# Patient Record
Sex: FEMALE | Race: WHITE | NOT HISPANIC OR LATINO | Employment: OTHER | URBAN - METROPOLITAN AREA
[De-identification: names, ages, dates, MRNs, and addresses within clinical notes are randomized per-mention and may not be internally consistent; named-entity substitution may affect disease eponyms.]

---

## 2023-06-16 LAB
BACTERIA, URINE: ABNORMAL /HPF
MUCUS, URINE: ABNORMAL /LPF
RBC, URINE: ABNORMAL /HPF (ref 0–5)
SQUAMOUS EPITHELIAL CELLS, URINE: 1 /HPF
WBC, URINE: 29 /HPF (ref 0–5)

## 2023-06-19 LAB — URINE CULTURE: ABNORMAL

## 2023-10-11 ENCOUNTER — APPOINTMENT (OUTPATIENT)
Dept: RADIOLOGY | Facility: HOSPITAL | Age: 73
End: 2023-10-11
Payer: MEDICARE

## 2023-10-11 ENCOUNTER — HOSPITAL ENCOUNTER (EMERGENCY)
Facility: HOSPITAL | Age: 73
Discharge: HOME | End: 2023-10-12
Attending: EMERGENCY MEDICINE
Payer: MEDICARE

## 2023-10-11 DIAGNOSIS — K45.0 INCARCERATED HERNIA OF ABDOMINAL CAVITY: Primary | ICD-10-CM

## 2023-10-11 DIAGNOSIS — N39.0 ACUTE LOWER UTI: ICD-10-CM

## 2023-10-11 LAB
BASOPHILS # BLD AUTO: 0.04 X10*3/UL (ref 0–0.1)
BASOPHILS NFR BLD AUTO: 0.3 %
CARDIAC TROPONIN I PNL SERPL HS: 5 NG/L (ref 0–13)
EOSINOPHIL # BLD AUTO: 0.03 X10*3/UL (ref 0–0.4)
EOSINOPHIL NFR BLD AUTO: 0.2 %
ERYTHROCYTE [DISTWIDTH] IN BLOOD BY AUTOMATED COUNT: 13.9 % (ref 11.5–14.5)
HCT VFR BLD AUTO: 39.9 % (ref 36–46)
HGB BLD-MCNC: 13.2 G/DL (ref 12–16)
IMM GRANULOCYTES # BLD AUTO: 0.04 X10*3/UL (ref 0–0.5)
IMM GRANULOCYTES NFR BLD AUTO: 0.3 % (ref 0–0.9)
LACTATE SERPL-SCNC: 1.8 MMOL/L (ref 0.4–2)
LYMPHOCYTES # BLD AUTO: 1.81 X10*3/UL (ref 0.8–3)
LYMPHOCYTES NFR BLD AUTO: 14.9 %
MCH RBC QN AUTO: 28.4 PG (ref 26–34)
MCHC RBC AUTO-ENTMCNC: 33.1 G/DL (ref 32–36)
MCV RBC AUTO: 86 FL (ref 80–100)
MONOCYTES # BLD AUTO: 0.9 X10*3/UL (ref 0.05–0.8)
MONOCYTES NFR BLD AUTO: 7.4 %
NEUTROPHILS # BLD AUTO: 9.36 X10*3/UL (ref 1.6–5.5)
NEUTROPHILS NFR BLD AUTO: 76.9 %
NRBC BLD-RTO: 0 /100 WBCS (ref 0–0)
PLATELET # BLD AUTO: 289 X10*3/UL (ref 150–450)
PMV BLD AUTO: 9 FL (ref 7.5–11.5)
RBC # BLD AUTO: 4.65 X10*6/UL (ref 4–5.2)
WBC # BLD AUTO: 12.2 X10*3/UL (ref 4.4–11.3)

## 2023-10-11 PROCEDURE — 83605 ASSAY OF LACTIC ACID: CPT | Performed by: EMERGENCY MEDICINE

## 2023-10-11 PROCEDURE — 83690 ASSAY OF LIPASE: CPT | Performed by: EMERGENCY MEDICINE

## 2023-10-11 PROCEDURE — 74177 CT ABD & PELVIS W/CONTRAST: CPT | Performed by: STUDENT IN AN ORGANIZED HEALTH CARE EDUCATION/TRAINING PROGRAM

## 2023-10-11 PROCEDURE — 85025 COMPLETE CBC W/AUTO DIFF WBC: CPT | Performed by: EMERGENCY MEDICINE

## 2023-10-11 PROCEDURE — 96374 THER/PROPH/DIAG INJ IV PUSH: CPT

## 2023-10-11 PROCEDURE — 99285 EMERGENCY DEPT VISIT HI MDM: CPT | Mod: 25

## 2023-10-11 PROCEDURE — 2500000004 HC RX 250 GENERAL PHARMACY W/ HCPCS (ALT 636 FOR OP/ED): Performed by: EMERGENCY MEDICINE

## 2023-10-11 PROCEDURE — 99285 EMERGENCY DEPT VISIT HI MDM: CPT | Performed by: EMERGENCY MEDICINE

## 2023-10-11 PROCEDURE — 80053 COMPREHEN METABOLIC PANEL: CPT | Performed by: EMERGENCY MEDICINE

## 2023-10-11 PROCEDURE — 96375 TX/PRO/DX INJ NEW DRUG ADDON: CPT

## 2023-10-11 PROCEDURE — 36415 COLL VENOUS BLD VENIPUNCTURE: CPT | Performed by: EMERGENCY MEDICINE

## 2023-10-11 PROCEDURE — 99284 EMERGENCY DEPT VISIT MOD MDM: CPT | Performed by: EMERGENCY MEDICINE

## 2023-10-11 PROCEDURE — 74177 CT ABD & PELVIS W/CONTRAST: CPT | Mod: ME

## 2023-10-11 PROCEDURE — 84484 ASSAY OF TROPONIN QUANT: CPT | Performed by: EMERGENCY MEDICINE

## 2023-10-11 PROCEDURE — G1004 CDSM NDSC: HCPCS

## 2023-10-11 RX ORDER — MORPHINE SULFATE 4 MG/ML
4 INJECTION, SOLUTION INTRAMUSCULAR; INTRAVENOUS ONCE
Status: COMPLETED | OUTPATIENT
Start: 2023-10-11 | End: 2023-10-11

## 2023-10-11 RX ORDER — ONDANSETRON HYDROCHLORIDE 2 MG/ML
4 INJECTION, SOLUTION INTRAVENOUS ONCE
Status: COMPLETED | OUTPATIENT
Start: 2023-10-11 | End: 2023-10-11

## 2023-10-11 RX ADMIN — MORPHINE SULFATE 4 MG: 4 INJECTION, SOLUTION INTRAMUSCULAR; INTRAVENOUS at 23:03

## 2023-10-11 RX ADMIN — SODIUM CHLORIDE 1000 ML: 9 INJECTION, SOLUTION INTRAVENOUS at 23:04

## 2023-10-11 RX ADMIN — ONDANSETRON 4 MG: 2 INJECTION INTRAMUSCULAR; INTRAVENOUS at 23:04

## 2023-10-11 ASSESSMENT — LIFESTYLE VARIABLES
EVER HAD A DRINK FIRST THING IN THE MORNING TO STEADY YOUR NERVES TO GET RID OF A HANGOVER: NO
REASON UNABLE TO ASSESS: NO
EVER FELT BAD OR GUILTY ABOUT YOUR DRINKING: NO
HAVE YOU EVER FELT YOU SHOULD CUT DOWN ON YOUR DRINKING: NO
HAVE PEOPLE ANNOYED YOU BY CRITICIZING YOUR DRINKING: NO

## 2023-10-11 ASSESSMENT — COLUMBIA-SUICIDE SEVERITY RATING SCALE - C-SSRS
2. HAVE YOU ACTUALLY HAD ANY THOUGHTS OF KILLING YOURSELF?: NO
1. IN THE PAST MONTH, HAVE YOU WISHED YOU WERE DEAD OR WISHED YOU COULD GO TO SLEEP AND NOT WAKE UP?: NO
6. HAVE YOU EVER DONE ANYTHING, STARTED TO DO ANYTHING, OR PREPARED TO DO ANYTHING TO END YOUR LIFE?: NO

## 2023-10-11 ASSESSMENT — PAIN - FUNCTIONAL ASSESSMENT: PAIN_FUNCTIONAL_ASSESSMENT: 0-10

## 2023-10-11 ASSESSMENT — PAIN DESCRIPTION - PROGRESSION: CLINICAL_PROGRESSION: NOT CHANGED

## 2023-10-11 ASSESSMENT — PAIN DESCRIPTION - LOCATION: LOCATION: BACK

## 2023-10-11 ASSESSMENT — PAIN SCALES - GENERAL
PAINLEVEL_OUTOF10: 10 - WORST POSSIBLE PAIN
PAINLEVEL_OUTOF10: 10 - WORST POSSIBLE PAIN

## 2023-10-12 VITALS
HEART RATE: 82 BPM | HEIGHT: 66 IN | BODY MASS INDEX: 24.11 KG/M2 | OXYGEN SATURATION: 93 % | WEIGHT: 150 LBS | RESPIRATION RATE: 22 BRPM | DIASTOLIC BLOOD PRESSURE: 71 MMHG | SYSTOLIC BLOOD PRESSURE: 169 MMHG

## 2023-10-12 LAB
ALBUMIN SERPL BCP-MCNC: 4.4 G/DL (ref 3.4–5)
ALP SERPL-CCNC: 91 U/L (ref 33–136)
ALT SERPL W P-5'-P-CCNC: 13 U/L (ref 7–45)
ANION GAP SERPL CALC-SCNC: 15 MMOL/L (ref 10–20)
APPEARANCE UR: ABNORMAL
AST SERPL W P-5'-P-CCNC: 20 U/L (ref 9–39)
BACTERIA #/AREA URNS AUTO: ABNORMAL /HPF
BILIRUB SERPL-MCNC: 0.5 MG/DL (ref 0–1.2)
BILIRUB UR STRIP.AUTO-MCNC: NEGATIVE MG/DL
BUN SERPL-MCNC: 20 MG/DL (ref 6–23)
CALCIUM SERPL-MCNC: 9.9 MG/DL (ref 8.6–10.3)
CARDIAC TROPONIN I PNL SERPL HS: 10 NG/L (ref 0–13)
CHLORIDE SERPL-SCNC: 96 MMOL/L (ref 98–107)
CO2 SERPL-SCNC: 22 MMOL/L (ref 21–32)
COLOR UR: YELLOW
CREAT SERPL-MCNC: 0.65 MG/DL (ref 0.5–1.05)
GFR SERPL CREATININE-BSD FRML MDRD: >90 ML/MIN/1.73M*2
GLUCOSE SERPL-MCNC: 117 MG/DL (ref 74–99)
GLUCOSE UR STRIP.AUTO-MCNC: NEGATIVE MG/DL
HOLD SPECIMEN: NORMAL
HOLD SPECIMEN: NORMAL
KETONES UR STRIP.AUTO-MCNC: ABNORMAL MG/DL
LEUKOCYTE ESTERASE UR QL STRIP.AUTO: ABNORMAL
LIPASE SERPL-CCNC: 8 U/L (ref 9–82)
NITRITE UR QL STRIP.AUTO: NEGATIVE
PH UR STRIP.AUTO: 7 [PH]
POTASSIUM SERPL-SCNC: 3.5 MMOL/L (ref 3.5–5.3)
PROT SERPL-MCNC: 7.4 G/DL (ref 6.4–8.2)
PROT UR STRIP.AUTO-MCNC: NEGATIVE MG/DL
RBC # UR STRIP.AUTO: NEGATIVE /UL
RBC #/AREA URNS AUTO: ABNORMAL /HPF
SODIUM SERPL-SCNC: 129 MMOL/L (ref 136–145)
SP GR UR STRIP.AUTO: 1.01
UROBILINOGEN UR STRIP.AUTO-MCNC: <2 MG/DL
WBC #/AREA URNS AUTO: ABNORMAL /HPF

## 2023-10-12 PROCEDURE — 36415 COLL VENOUS BLD VENIPUNCTURE: CPT | Performed by: EMERGENCY MEDICINE

## 2023-10-12 PROCEDURE — 2550000001 HC RX 255 CONTRASTS: Performed by: EMERGENCY MEDICINE

## 2023-10-12 PROCEDURE — 81001 URINALYSIS AUTO W/SCOPE: CPT | Performed by: EMERGENCY MEDICINE

## 2023-10-12 PROCEDURE — 84484 ASSAY OF TROPONIN QUANT: CPT | Performed by: EMERGENCY MEDICINE

## 2023-10-12 RX ORDER — CEPHALEXIN 500 MG/1
500 CAPSULE ORAL ONCE
Status: DISCONTINUED | OUTPATIENT
Start: 2023-10-12 | End: 2023-10-12 | Stop reason: HOSPADM

## 2023-10-12 RX ORDER — CEPHALEXIN 500 MG/1
500 CAPSULE ORAL ONCE
Status: DISCONTINUED | OUTPATIENT
Start: 2023-10-12 | End: 2023-10-12

## 2023-10-12 RX ORDER — CEPHALEXIN 500 MG/1
500 CAPSULE ORAL 4 TIMES DAILY
Qty: 40 CAPSULE | Refills: 0 | Status: SHIPPED | OUTPATIENT
Start: 2023-10-12 | End: 2023-10-22

## 2023-10-12 RX ADMIN — IOHEXOL 75 ML: 350 INJECTION, SOLUTION INTRAVENOUS at 01:17

## 2023-10-12 NOTE — DISCHARGE INSTRUCTIONS
Follow-up with your primary care physician and with surgery.  Seek immediate medical attention with any worsening symptoms, worsening abdominal pain, vomiting, fevers, chills or for any reason

## 2023-10-12 NOTE — ED PROVIDER NOTES
HPI   No chief complaint on file.      Patient is a 73-year-old female presents emergency department via EMS with chief complaint of epigastric abdominal pain.  Patient states that this evening, she took a new medication for overactive bladder, mybertiq.  About 1 hour after taking this medication she developed pain.  Pain is described as severe, sharp pain located in her epigastrium and nonradiating.  Pain is worse with palpation.  Patient also noticed a mass that appeared at this time.  Patient states she is never had similar symptoms in the past.  No known hernia.  She denies any fevers, chills, chest pain, shortness of breath, vomiting, diarrhea, constipation, blood per rectum.  Patient does admit to nausea associated with her pain.  She did take Vicodin for pain in her hip earlier this evening.                          Thais Coma Scale Score: 15                  Patient History   Past Medical History:   Diagnosis Date    Drusen (degenerative) of macula, unspecified eye 02/19/2015    Macular drusen     Past Surgical History:   Procedure Laterality Date    OTHER SURGICAL HISTORY  03/06/2020    Rectocele and cystocele repair     No family history on file.  Social History     Tobacco Use    Smoking status: Not on file    Smokeless tobacco: Not on file   Substance Use Topics    Alcohol use: Not on file    Drug use: Not on file       Physical Exam   ED Triage Vitals   Temp Pulse Resp BP   -- -- -- --      SpO2 Temp src Heart Rate Source Patient Position   -- -- -- --      BP Location FiO2 (%)     -- --       Physical Exam  Vitals and nursing note reviewed.   Constitutional:       Appearance: Normal appearance.   HENT:      Head: Normocephalic and atraumatic.      Nose: Nose normal.      Mouth/Throat:      Mouth: Mucous membranes are moist.   Eyes:      Pupils: Pupils are equal, round, and reactive to light.   Cardiovascular:      Rate and Rhythm: Normal rate and regular rhythm.   Pulmonary:      Effort: Pulmonary  effort is normal.      Breath sounds: Normal breath sounds.   Abdominal:      Palpations: There is mass (Incarcerated abdominal wall hernia).      Tenderness: There is abdominal tenderness.   Musculoskeletal:         General: Normal range of motion.      Cervical back: Normal range of motion and neck supple.   Skin:     General: Skin is warm and dry.      Capillary Refill: Capillary refill takes less than 2 seconds.   Neurological:      General: No focal deficit present.      Mental Status: She is alert and oriented to person, place, and time.         ED Course & MDM   ED Course as of 10/13/23 0833   Thu Oct 12, 2023   0021 On second attempt, hernia successfully reduced.  Patient states her pain has improved.  Patient had not gone to CT imaging yet. [PS]      ED Course User Index  [PS] Alek Perdomo, DO         Diagnoses as of 10/13/23 0833   Incarcerated hernia of abdominal cavity   Acute lower UTI       Medical Decision Making  Patient is seen and examined.  Abdominal work-up is initiated.  Patient does feel to have an incarcerated abdominal wall hernia.  Patient is unable to tolerate reduction at times initially.  Patient is given pain medications and IV fluids.  Patient will be placed in Trendelenburg with an ice pack and attempt once again to reduce the hernia.  Initial attempt is not well tolerated. Patient is placed in further trendelenburg. And Ice pack reapplied.  Hernia reduction again attempted.  General pressure is placed until the hernia is reduced.  Patient does feel immediate relief.  CT imaging is obtained after the hernia is reduced.  There is no evidence of obstruction.  There is an abdominal wall hernia with some fluid present in the hernia sac.  Patient is able to eat and drink without vomiting.  Her pain is resolved.  Patient's lab work is positive for urinary tract infection.  Patient refuses initial dose of antibiotics in the emergency department, she will be discharged.  Advised to  follow-up with primary care physician.  Advised follow-up general surgery.  Patient is given strict return precautions.  Patient understands and agrees with discharge plan.          Procedure  Procedures     Alek Perdomo DO  10/13/23 0838

## 2023-10-13 ENCOUNTER — HOSPITAL ENCOUNTER (OUTPATIENT)
Dept: CARDIOLOGY | Facility: HOSPITAL | Age: 73
Discharge: HOME | End: 2023-10-13
Payer: MEDICARE

## 2023-10-13 LAB
ATRIAL RATE: 73 BPM
P AXIS: 30 DEGREES
P OFFSET: 183 MS
P ONSET: 132 MS
PR INTERVAL: 176 MS
Q ONSET: 220 MS
QRS COUNT: 11 BEATS
QRS DURATION: 84 MS
QT INTERVAL: 424 MS
QTC CALCULATION(BAZETT): 467 MS
QTC FREDERICIA: 452 MS
R AXIS: 35 DEGREES
T AXIS: 58 DEGREES
T OFFSET: 432 MS
VENTRICULAR RATE: 73 BPM

## 2023-10-13 PROCEDURE — 93005 ELECTROCARDIOGRAM TRACING: CPT

## 2023-10-19 ENCOUNTER — APPOINTMENT (OUTPATIENT)
Dept: SURGERY | Facility: CLINIC | Age: 73
End: 2023-10-19
Payer: MEDICARE

## 2023-10-22 ENCOUNTER — APPOINTMENT (OUTPATIENT)
Dept: RADIOLOGY | Facility: HOSPITAL | Age: 73
End: 2023-10-22
Payer: MEDICARE

## 2023-10-22 ENCOUNTER — HOSPITAL ENCOUNTER (EMERGENCY)
Facility: HOSPITAL | Age: 73
Discharge: HOME | End: 2023-10-22
Attending: STUDENT IN AN ORGANIZED HEALTH CARE EDUCATION/TRAINING PROGRAM
Payer: MEDICARE

## 2023-10-22 VITALS
DIASTOLIC BLOOD PRESSURE: 53 MMHG | TEMPERATURE: 98.2 F | SYSTOLIC BLOOD PRESSURE: 143 MMHG | BODY MASS INDEX: 24.09 KG/M2 | HEIGHT: 66 IN | WEIGHT: 149.91 LBS | RESPIRATION RATE: 16 BRPM | OXYGEN SATURATION: 98 % | HEART RATE: 78 BPM

## 2023-10-22 DIAGNOSIS — R33.9 URINARY RETENTION: ICD-10-CM

## 2023-10-22 DIAGNOSIS — M54.32 SCIATICA OF LEFT SIDE: ICD-10-CM

## 2023-10-22 DIAGNOSIS — N39.0 URINARY TRACT INFECTION WITHOUT HEMATURIA, SITE UNSPECIFIED: Primary | ICD-10-CM

## 2023-10-22 LAB
APPEARANCE UR: ABNORMAL
BACTERIA #/AREA URNS AUTO: ABNORMAL /HPF
BILIRUB UR STRIP.AUTO-MCNC: NEGATIVE MG/DL
COLOR UR: YELLOW
GLUCOSE UR STRIP.AUTO-MCNC: NEGATIVE MG/DL
HOLD SPECIMEN: NORMAL
KETONES UR STRIP.AUTO-MCNC: NEGATIVE MG/DL
LEUKOCYTE ESTERASE UR QL STRIP.AUTO: ABNORMAL
NITRITE UR QL STRIP.AUTO: POSITIVE
PH UR STRIP.AUTO: 6 [PH]
PROT UR STRIP.AUTO-MCNC: ABNORMAL MG/DL
RBC # UR STRIP.AUTO: NEGATIVE /UL
RBC #/AREA URNS AUTO: ABNORMAL /HPF
SP GR UR STRIP.AUTO: 1.01
UROBILINOGEN UR STRIP.AUTO-MCNC: <2 MG/DL
WBC #/AREA URNS AUTO: >50 /HPF

## 2023-10-22 PROCEDURE — 81001 URINALYSIS AUTO W/SCOPE: CPT | Performed by: STUDENT IN AN ORGANIZED HEALTH CARE EDUCATION/TRAINING PROGRAM

## 2023-10-22 PROCEDURE — 99284 EMERGENCY DEPT VISIT MOD MDM: CPT | Performed by: STUDENT IN AN ORGANIZED HEALTH CARE EDUCATION/TRAINING PROGRAM

## 2023-10-22 PROCEDURE — 2500000004 HC RX 250 GENERAL PHARMACY W/ HCPCS (ALT 636 FOR OP/ED): Performed by: STUDENT IN AN ORGANIZED HEALTH CARE EDUCATION/TRAINING PROGRAM

## 2023-10-22 PROCEDURE — 72148 MRI LUMBAR SPINE W/O DYE: CPT | Mod: MG

## 2023-10-22 PROCEDURE — 96375 TX/PRO/DX INJ NEW DRUG ADDON: CPT

## 2023-10-22 PROCEDURE — 96365 THER/PROPH/DIAG IV INF INIT: CPT

## 2023-10-22 PROCEDURE — 72148 MRI LUMBAR SPINE W/O DYE: CPT | Performed by: RADIOLOGY

## 2023-10-22 PROCEDURE — 96375 TX/PRO/DX INJ NEW DRUG ADDON: CPT | Performed by: STUDENT IN AN ORGANIZED HEALTH CARE EDUCATION/TRAINING PROGRAM

## 2023-10-22 PROCEDURE — 96372 THER/PROPH/DIAG INJ SC/IM: CPT | Performed by: STUDENT IN AN ORGANIZED HEALTH CARE EDUCATION/TRAINING PROGRAM

## 2023-10-22 PROCEDURE — 99285 EMERGENCY DEPT VISIT HI MDM: CPT | Performed by: STUDENT IN AN ORGANIZED HEALTH CARE EDUCATION/TRAINING PROGRAM

## 2023-10-22 PROCEDURE — 96372 THER/PROPH/DIAG INJ SC/IM: CPT

## 2023-10-22 PROCEDURE — 96365 THER/PROPH/DIAG IV INF INIT: CPT | Performed by: STUDENT IN AN ORGANIZED HEALTH CARE EDUCATION/TRAINING PROGRAM

## 2023-10-22 RX ORDER — KETOROLAC TROMETHAMINE 30 MG/ML
15 INJECTION, SOLUTION INTRAMUSCULAR; INTRAVENOUS ONCE
Status: COMPLETED | OUTPATIENT
Start: 2023-10-22 | End: 2023-10-22

## 2023-10-22 RX ORDER — ORPHENADRINE CITRATE 30 MG/ML
60 INJECTION INTRAMUSCULAR; INTRAVENOUS ONCE
Status: COMPLETED | OUTPATIENT
Start: 2023-10-22 | End: 2023-10-22

## 2023-10-22 RX ORDER — CEFTRIAXONE 1 G/50ML
1 INJECTION, SOLUTION INTRAVENOUS ONCE
Status: COMPLETED | OUTPATIENT
Start: 2023-10-22 | End: 2023-10-22

## 2023-10-22 RX ORDER — SULFAMETHOXAZOLE AND TRIMETHOPRIM 800; 160 MG/1; MG/1
1 TABLET ORAL 2 TIMES DAILY
Qty: 14 TABLET | Refills: 0 | Status: SHIPPED | OUTPATIENT
Start: 2023-10-22 | End: 2023-10-27

## 2023-10-22 RX ORDER — PREDNISONE 50 MG/1
50 TABLET ORAL DAILY
Qty: 5 TABLET | Refills: 0 | Status: SHIPPED | OUTPATIENT
Start: 2023-10-22 | End: 2023-10-27

## 2023-10-22 RX ORDER — MORPHINE SULFATE 4 MG/ML
4 INJECTION, SOLUTION INTRAMUSCULAR; INTRAVENOUS ONCE
Status: COMPLETED | OUTPATIENT
Start: 2023-10-22 | End: 2023-10-22

## 2023-10-22 RX ADMIN — CEFTRIAXONE SODIUM 1 G: 1 INJECTION, SOLUTION INTRAVENOUS at 10:35

## 2023-10-22 RX ADMIN — ORPHENADRINE CITRATE 60 MG: 60 INJECTION INTRAMUSCULAR; INTRAVENOUS at 08:28

## 2023-10-22 RX ADMIN — KETOROLAC TROMETHAMINE 15 MG: 60 INJECTION, SOLUTION INTRAMUSCULAR at 08:27

## 2023-10-22 RX ADMIN — MORPHINE SULFATE 4 MG: 4 INJECTION, SOLUTION INTRAMUSCULAR; INTRAVENOUS at 10:34

## 2023-10-22 ASSESSMENT — PAIN DESCRIPTION - FREQUENCY: FREQUENCY: CONSTANT/CONTINUOUS

## 2023-10-22 ASSESSMENT — PAIN DESCRIPTION - LOCATION: LOCATION: HIP

## 2023-10-22 ASSESSMENT — LIFESTYLE VARIABLES
HAVE PEOPLE ANNOYED YOU BY CRITICIZING YOUR DRINKING: NO
REASON UNABLE TO ASSESS: NO
EVER FELT BAD OR GUILTY ABOUT YOUR DRINKING: NO
HAVE YOU EVER FELT YOU SHOULD CUT DOWN ON YOUR DRINKING: NO
EVER HAD A DRINK FIRST THING IN THE MORNING TO STEADY YOUR NERVES TO GET RID OF A HANGOVER: NO

## 2023-10-22 ASSESSMENT — PAIN SCALES - GENERAL
PAINLEVEL_OUTOF10: 10 - WORST POSSIBLE PAIN
PAINLEVEL_OUTOF10: 10 - WORST POSSIBLE PAIN
PAINLEVEL_OUTOF10: 9

## 2023-10-22 ASSESSMENT — PAIN DESCRIPTION - ORIENTATION: ORIENTATION: RIGHT

## 2023-10-22 ASSESSMENT — PAIN - FUNCTIONAL ASSESSMENT: PAIN_FUNCTIONAL_ASSESSMENT: 0-10

## 2023-10-22 ASSESSMENT — PAIN DESCRIPTION - DESCRIPTORS: DESCRIPTORS: PRESSURE;RADIATING;SHARP;SHOOTING

## 2023-10-22 ASSESSMENT — PAIN DESCRIPTION - DIRECTION: RADIATING_TOWARDS: LEG

## 2023-10-22 ASSESSMENT — COLUMBIA-SUICIDE SEVERITY RATING SCALE - C-SSRS
1. IN THE PAST MONTH, HAVE YOU WISHED YOU WERE DEAD OR WISHED YOU COULD GO TO SLEEP AND NOT WAKE UP?: NO
2. HAVE YOU ACTUALLY HAD ANY THOUGHTS OF KILLING YOURSELF?: NO
6. HAVE YOU EVER DONE ANYTHING, STARTED TO DO ANYTHING, OR PREPARED TO DO ANYTHING TO END YOUR LIFE?: NO

## 2023-10-22 ASSESSMENT — PAIN DESCRIPTION - ONSET: ONSET: ONGOING

## 2023-10-22 NOTE — DISCHARGE INSTRUCTIONS
Please follow-up with your primary care physician as well as with urology for your urinary retention.  Please take the medications you are prescribed as instructed.  If you develop any lethargy, severe worsening pain, or if you have any other concerns, please return to the nearest ER for further care.

## 2023-10-22 NOTE — ED PROVIDER NOTES
HPI   Chief Complaint   Patient presents with    Hip Pain     Patient has had left hip pain for one month and this morning she was unable to urinate.        HPI  Patient is a 73-year-old female presents the ER due to concern for low back pain and urinary retention.  Patient states that she had a fall earlier this month and she has had intermittent low back pain with pain rating down her left leg.  She states that today pain was worse however she began to have some urinary retention and his last urinated 13 hours prior.  She states she was in the ER within the past 2 weeks and was diagnosed with a UTI was started on antibiotics.  She has completed course since then.  She denies any dysuria, hematuria.  She reports the pain in her back is in her buttock and radiates down her left leg.  She states that she was told that physical therapy that may be related to her piriformis.  She denies any flank pain, abdominal pain, chest pain, shortness of breath.  She denies any fevers or chills.                  Thais Coma Scale Score: 15                  Patient History   Past Medical History:   Diagnosis Date    Drusen (degenerative) of macula, unspecified eye 02/19/2015    Macular drusen     Past Surgical History:   Procedure Laterality Date    OTHER SURGICAL HISTORY  03/06/2020    Rectocele and cystocele repair     No family history on file.  Social History     Tobacco Use    Smoking status: Never    Smokeless tobacco: Never   Substance Use Topics    Alcohol use: Not on file    Drug use: Not on file       Physical Exam   ED Triage Vitals [10/22/23 0736]   Temp Heart Rate Resp BP   36.3 °C (97.3 °F) 88 16 141/68      SpO2 Temp Source Heart Rate Source Patient Position   98 % Temporal -- --      BP Location FiO2 (%)     Right arm --       Physical Exam  Vitals and nursing note reviewed.   Constitutional:       General: She is not in acute distress.     Appearance: She is well-developed.   HENT:      Head: Normocephalic and  atraumatic.   Eyes:      Conjunctiva/sclera: Conjunctivae normal.   Cardiovascular:      Rate and Rhythm: Normal rate and regular rhythm.      Heart sounds: No murmur heard.  Pulmonary:      Effort: Pulmonary effort is normal. No respiratory distress.      Breath sounds: Normal breath sounds.   Abdominal:      Palpations: Abdomen is soft.      Tenderness: There is no abdominal tenderness.   Musculoskeletal:         General: No swelling.      Cervical back: Neck supple.      Comments: Plan straight leg test on left.  No midline C, T, L-spine tenderness to palpation.   Skin:     General: Skin is warm and dry.      Capillary Refill: Capillary refill takes less than 2 seconds.   Neurological:      Mental Status: She is alert.   Psychiatric:         Mood and Affect: Mood normal.         ED Course & MDM   ED Course as of 10/22/23 1126   Sun Oct 22, 2023   1031 Patient is a 73-year-old female who presents to the ER today due to concern for low back pain with pain radiating to her leg as well as urinary retention.  On arrival, patient was in no acute distress however did have significant pain.  She had positive straight leg test on left.  She was endorsing urinary retention on point-of-care ultrasound imaging of her bladder, there was significant amount of urine.  She did receive straight catheterization with approximately 1 L of urine output.  We did send urine for UA.  Given her urinary tension as well as her back pain, did order MRI imaging as she is scheduled to have this anyways in 2 days.  We will assess for cauda equina syndrome.  Did give patient Norflex as well as Toradol in ER. [MJ]   1032 Bacteria, Urine(!): 2+ [MJ]   1033 Nitrite, Urine(!): POSITIVE [MJ]   1033 Leukocyte Esterase, Urine(!): LARGE (3+)  Patient did test positive for UTI.  She was given dose of Rocephin. [MJ]   1123 MR lumbar spine wo IV contrast  IMPRESSION:  Degenerative changes of the lumbar spine as above described without  high-grade central  canal or foraminal stenosis identified.   [MJ]   1123 Patient was given dose of Rocephin in ER for UTI.  Patient does not have any signs of cauda equina on MRI imaging.  Do feel comfortable discharging patient home.  Did offer Mathias catheter for patient, which she was agreeable to.  Given this, Mathias will be placed in ER and patient will follow-up with urology at outpatient.  She is given Bactrim for home.  Patient does have muscle relaxers at home, which did help so we will hold on prescribing this today.  Did give prednisone burst prescription for her.  She was instructed to follow-up with her PCP and urology.  Strict return precautions were given.  Patient was understanding and agreeable with plan for discharge. [MJ]      ED Course User Index  [MJ] Carmelo Lewis DO         Diagnoses as of 10/22/23 1126   Urinary tract infection without hematuria, site unspecified   Sciatica of left side   Urinary retention       Medical Decision Making      Procedure  Procedures     Carmelo Lewis DO  Resident  10/22/23 1126

## 2023-10-23 ENCOUNTER — TELEPHONE (OUTPATIENT)
Dept: UROLOGY | Facility: CLINIC | Age: 73
End: 2023-10-23

## 2023-10-23 ENCOUNTER — OFFICE VISIT (OUTPATIENT)
Dept: UROLOGY | Facility: CLINIC | Age: 73
End: 2023-10-23
Payer: MEDICARE

## 2023-10-23 VITALS — SYSTOLIC BLOOD PRESSURE: 124 MMHG | DIASTOLIC BLOOD PRESSURE: 81 MMHG | TEMPERATURE: 97.8 F | HEART RATE: 105 BPM

## 2023-10-23 DIAGNOSIS — R33.9 URINARY RETENTION: Primary | ICD-10-CM

## 2023-10-23 DIAGNOSIS — N30.00 ACUTE CYSTITIS WITHOUT HEMATURIA: ICD-10-CM

## 2023-10-23 PROCEDURE — 1159F MED LIST DOCD IN RCRD: CPT | Performed by: NURSE PRACTITIONER

## 2023-10-23 PROCEDURE — 99213 OFFICE O/P EST LOW 20 MIN: CPT | Performed by: NURSE PRACTITIONER

## 2023-10-23 PROCEDURE — 1125F AMNT PAIN NOTED PAIN PRSNT: CPT | Performed by: NURSE PRACTITIONER

## 2023-10-23 PROCEDURE — 1036F TOBACCO NON-USER: CPT | Performed by: NURSE PRACTITIONER

## 2023-10-23 RX ORDER — TAMSULOSIN HYDROCHLORIDE 0.4 MG/1
0.4 CAPSULE ORAL NIGHTLY
Qty: 30 CAPSULE | Refills: 0 | Status: SHIPPED | OUTPATIENT
Start: 2023-10-23 | End: 2023-11-15 | Stop reason: SDUPTHER

## 2023-10-23 NOTE — TELEPHONE ENCOUNTER
Pt left message stating she forgot to ask you if she can take her Myrbetriq 25mg that was prescribed by her PCP throughout all this because she thought she read somewhere that it can cause retention. Please advise, thanks

## 2023-10-23 NOTE — PATIENT INSTRUCTIONS
Finish antibiotics  Will keep catheter 1-2 weeks to allow bladder decompress  Tamsulosin 0.4 mg at bedtime  TOV in 1-2 weeks early a.m. appt.

## 2023-10-23 NOTE — PROGRESS NOTES
10/23/23   [id number]    Urinary retention, UTI     Subjective      HPI Demetria Enriquez is a 73 y.o. female who presents for urinary retention, follow up ER visit. Seen in ER yesterday, had not urinated 13 hrs, 1 liter when cath placed; recent UTI, still appeared infected, started on bactrim. MRI done showed degenerative changes but no high grade central canal or foraminal stenosis identified; CT abd/pel 10/12/23 no hydro, no stones, mild bladder distention, constipation;     6/17/23 Ecoli + culture,  no culture earlier in month noted on chart;         Objective     There were no vitals taken for this visit.   Physical Exam  Constitutional:       Appearance: Normal appearance. She is normal weight.   HENT:      Head: Normocephalic and atraumatic.      Nose: Nose normal.   Pulmonary:      Effort: Pulmonary effort is normal.   Musculoskeletal:      Comments: In wheel chair   Skin:     General: Skin is warm and dry.   Neurological:      General: No focal deficit present.      Mental Status: She is alert. Mental status is at baseline.   Psychiatric:         Mood and Affect: Mood normal.         Thought Content: Thought content normal.         Judgment: Judgment normal.         Assessment/Plan   Problem List Items Addressed This Visit          Genitourinary and Reproductive    Urinary retention - Primary     Other Visit Diagnoses       Acute cystitis without hematuria              No orders of the defined types were placed in this encounter.     Finish antibiotics  Will keep catheter 1-2 weeks to allow bladder decompress  Tamsulosin 0.4 mg at bedtime  TOV in 1-2 weeks early a.m. appt.       NUZHAT Tapia-CNP  Lab Results   Component Value Date    GLUCOSE 117 (H) 10/11/2023    CALCIUM 9.9 10/11/2023     (L) 10/11/2023    K 3.5 10/11/2023    CO2 22 10/11/2023    CL 96 (L) 10/11/2023    BUN 20 10/11/2023    CREATININE 0.65 10/11/2023       
None

## 2023-10-26 ENCOUNTER — TELEPHONE (OUTPATIENT)
Dept: UROLOGY | Facility: CLINIC | Age: 73
End: 2023-10-26
Payer: MEDICARE

## 2023-10-26 DIAGNOSIS — N32.89 BLADDER SPASM: Primary | ICD-10-CM

## 2023-10-26 RX ORDER — PHENAZOPYRIDINE HYDROCHLORIDE 200 MG/1
200 TABLET, FILM COATED ORAL 3 TIMES DAILY PRN
Qty: 10 TABLET | Refills: 0 | Status: SHIPPED | OUTPATIENT
Start: 2023-10-26 | End: 2023-10-29

## 2023-10-26 NOTE — TELEPHONE ENCOUNTER
Pt left message asking for return call. Spoke with pt who states she has one or two days left on the Bactrim but she is still experiencing pelvic pain and cramping and wanted Tuyet to know. Tried to get her in tomorrow instead of Monday but we were unable to make it work. Please advise on any other possible tx for her Sx until we see her for a TOV on Monday, thanks

## 2023-10-27 ENCOUNTER — HOSPITAL ENCOUNTER (EMERGENCY)
Facility: HOSPITAL | Age: 73
Discharge: HOME | End: 2023-10-27
Attending: EMERGENCY MEDICINE
Payer: MEDICARE

## 2023-10-27 ENCOUNTER — TELEPHONE (OUTPATIENT)
Dept: UROLOGY | Facility: CLINIC | Age: 73
End: 2023-10-27
Payer: MEDICARE

## 2023-10-27 VITALS
WEIGHT: 150 LBS | DIASTOLIC BLOOD PRESSURE: 96 MMHG | HEART RATE: 80 BPM | SYSTOLIC BLOOD PRESSURE: 162 MMHG | HEIGHT: 66 IN | RESPIRATION RATE: 13 BRPM | OXYGEN SATURATION: 92 % | BODY MASS INDEX: 24.11 KG/M2 | TEMPERATURE: 98.4 F

## 2023-10-27 DIAGNOSIS — Z97.8 FOLEY CATHETER IN PLACE: Primary | ICD-10-CM

## 2023-10-27 PROBLEM — H52.203 HYPEROPIA OF BOTH EYES WITH ASTIGMATISM AND PRESBYOPIA: Status: ACTIVE | Noted: 2023-10-27

## 2023-10-27 PROBLEM — M25.60 LIMITED JOINT RANGE OF MOTION (ROM): Status: ACTIVE | Noted: 2018-11-05

## 2023-10-27 PROBLEM — M54.32 NEURALGIA OF LEFT SCIATIC NERVE: Status: ACTIVE | Noted: 2023-10-13

## 2023-10-27 PROBLEM — H52.03 HYPEROPIA OF BOTH EYES WITH ASTIGMATISM AND PRESBYOPIA: Status: ACTIVE | Noted: 2023-10-27

## 2023-10-27 PROBLEM — H43.811 PVD (POSTERIOR VITREOUS DETACHMENT), RIGHT EYE: Status: ACTIVE | Noted: 2023-10-27

## 2023-10-27 PROBLEM — M19.91 LOCALIZED, PRIMARY OSTEOARTHRITIS: Status: ACTIVE | Noted: 2021-02-01

## 2023-10-27 PROBLEM — H35.30 AMD (AGE-RELATED MACULAR DEGENERATION), BILATERAL: Status: ACTIVE | Noted: 2023-10-27

## 2023-10-27 PROBLEM — N95.2 POSTMENOPAUSAL ATROPHIC VAGINITIS: Status: ACTIVE | Noted: 2018-07-06

## 2023-10-27 PROBLEM — R35.1 NOCTURIA: Status: ACTIVE | Noted: 2023-10-27

## 2023-10-27 PROBLEM — N95.8 GENITOURINARY SYNDROME OF MENOPAUSE: Status: ACTIVE | Noted: 2023-10-27

## 2023-10-27 PROBLEM — H52.4 HYPEROPIA OF BOTH EYES WITH ASTIGMATISM AND PRESBYOPIA: Status: ACTIVE | Noted: 2023-10-27

## 2023-10-27 PROBLEM — M47.817 LUMBOSACRAL SPONDYLOSIS WITHOUT MYELOPATHY: Status: ACTIVE | Noted: 2023-10-24

## 2023-10-27 PROBLEM — K59.02 OUTLET DYSFUNCTION CONSTIPATION: Status: ACTIVE | Noted: 2018-07-06

## 2023-10-27 PROBLEM — N39.0 ACUTE UTI (URINARY TRACT INFECTION): Status: ACTIVE | Noted: 2023-10-27

## 2023-10-27 PROBLEM — H25.10 NUCLEAR SCLEROSIS: Status: ACTIVE | Noted: 2023-10-27

## 2023-10-27 PROBLEM — G25.3 MYOCLONUS: Status: ACTIVE | Noted: 2023-10-27

## 2023-10-27 PROCEDURE — 99283 EMERGENCY DEPT VISIT LOW MDM: CPT | Performed by: EMERGENCY MEDICINE

## 2023-10-27 PROCEDURE — 99284 EMERGENCY DEPT VISIT MOD MDM: CPT | Performed by: EMERGENCY MEDICINE

## 2023-10-27 RX ORDER — BACLOFEN 10 MG/1
10 TABLET ORAL 4 TIMES DAILY
COMMUNITY
End: 2023-11-28 | Stop reason: ALTCHOICE

## 2023-10-27 RX ORDER — CALCIUM CARBONATE/VITAMIN D3 500 MG-10
1 TABLET,CHEWABLE ORAL DAILY
COMMUNITY
End: 2023-12-19 | Stop reason: ENTERED-IN-ERROR

## 2023-10-27 RX ORDER — ESCITALOPRAM OXALATE 10 MG/1
10 TABLET ORAL
COMMUNITY
End: 2023-12-19 | Stop reason: ENTERED-IN-ERROR

## 2023-10-27 RX ORDER — NAPROXEN SODIUM 220 MG
220 TABLET ORAL 2 TIMES DAILY
COMMUNITY
End: 2023-11-28 | Stop reason: SDUPTHER

## 2023-10-27 RX ORDER — OMEPRAZOLE 40 MG/1
40 CAPSULE, DELAYED RELEASE ORAL DAILY
COMMUNITY
Start: 2023-10-14

## 2023-10-27 RX ORDER — NITROFURANTOIN 25; 75 MG/1; MG/1
100 CAPSULE ORAL EVERY 12 HOURS
COMMUNITY
Start: 2023-06-16 | End: 2023-11-28 | Stop reason: ALTCHOICE

## 2023-10-27 RX ORDER — NAPROXEN 500 MG/1
500 TABLET ORAL
COMMUNITY
End: 2024-02-25 | Stop reason: HOSPADM

## 2023-10-27 RX ORDER — CITALOPRAM 20 MG/1
20 TABLET, FILM COATED ORAL DAILY
COMMUNITY

## 2023-10-27 RX ORDER — SUMATRIPTAN SUCCINATE 100 MG/1
100 TABLET ORAL DAILY PRN
COMMUNITY

## 2023-10-27 RX ORDER — ESTRADIOL 0.1 MG/G
CREAM VAGINAL
COMMUNITY
End: 2023-12-19 | Stop reason: ENTERED-IN-ERROR

## 2023-10-27 RX ORDER — ARTIFICIAL TEARS 1; 2; 3 MG/ML; MG/ML; MG/ML
1 SOLUTION/ DROPS OPHTHALMIC 4 TIMES DAILY PRN
COMMUNITY
End: 2023-12-19 | Stop reason: ENTERED-IN-ERROR

## 2023-10-27 RX ORDER — HYDROCODONE BITARTRATE AND ACETAMINOPHEN 5; 325 MG/1; MG/1
TABLET ORAL
COMMUNITY
Start: 2023-10-24 | End: 2023-12-19 | Stop reason: ENTERED-IN-ERROR

## 2023-10-27 RX ORDER — SIMVASTATIN 20 MG/1
20 TABLET, FILM COATED ORAL NIGHTLY
COMMUNITY

## 2023-10-27 RX ORDER — NICOTINE POLACRILEX 2 MG
GUM BUCCAL
COMMUNITY
End: 2023-12-19 | Stop reason: ENTERED-IN-ERROR

## 2023-10-27 RX ORDER — ASPIRIN 325 MG
TABLET, DELAYED RELEASE (ENTERIC COATED) ORAL
COMMUNITY
End: 2023-12-19 | Stop reason: ENTERED-IN-ERROR

## 2023-10-27 RX ORDER — FARICIMAB 6 MG/.05ML
6 INJECTION, SOLUTION INTRAVITREAL
COMMUNITY
End: 2023-12-21 | Stop reason: HOSPADM

## 2023-10-27 RX ORDER — LEVOTHYROXINE SODIUM 25 UG/1
TABLET ORAL
COMMUNITY
End: 2023-12-19 | Stop reason: ENTERED-IN-ERROR

## 2023-10-27 RX ORDER — VIT C/E/ZN/COPPR/LUTEIN/ZEAXAN 250MG-90MG
1 CAPSULE ORAL 2 TIMES DAILY
COMMUNITY
End: 2023-12-19 | Stop reason: ENTERED-IN-ERROR

## 2023-10-27 RX ORDER — CHOLECALCIFEROL (VITAMIN D3) 25 MCG
25 TABLET ORAL DAILY
COMMUNITY

## 2023-10-27 RX ORDER — POLYETHYLENE GLYCOL 400 AND PROPYLENE GLYCOL 4; 3 MG/ML; MG/ML
SOLUTION/ DROPS OPHTHALMIC
COMMUNITY
End: 2023-12-19 | Stop reason: ENTERED-IN-ERROR

## 2023-10-27 RX ORDER — LEVOTHYROXINE SODIUM 50 UG/1
50 TABLET ORAL
COMMUNITY

## 2023-10-27 RX ORDER — DICLOFENAC SODIUM 10 MG/G
2 GEL TOPICAL EVERY 6 HOURS PRN
COMMUNITY
Start: 2021-02-01 | End: 2023-12-19 | Stop reason: ENTERED-IN-ERROR

## 2023-10-27 RX ORDER — RISEDRONATE SODIUM 35 MG/1
35 TABLET, FILM COATED ORAL
COMMUNITY
Start: 2008-12-15 | End: 2023-11-28 | Stop reason: ALTCHOICE

## 2023-10-27 RX ORDER — DIPHENHYDRAMINE HCL 25 MG
TABLET ORAL
COMMUNITY
End: 2023-12-19 | Stop reason: ENTERED-IN-ERROR

## 2023-10-27 RX ORDER — TALC
6 POWDER (GRAM) TOPICAL NIGHTLY
COMMUNITY

## 2023-10-27 RX ORDER — PSYLLIUM SEED (WITH DEXTROSE)
POWDER (GRAM) ORAL
COMMUNITY
End: 2023-11-28 | Stop reason: SDUPTHER

## 2023-10-27 RX ORDER — GUAIFENESIN 1200 MG
TABLET, EXTENDED RELEASE 12 HR ORAL
COMMUNITY
End: 2023-12-19 | Stop reason: ENTERED-IN-ERROR

## 2023-10-27 RX ORDER — CLOTRIMAZOLE AND BETAMETHASONE DIPROPIONATE 10; .64 MG/G; MG/G
CREAM TOPICAL 2 TIMES DAILY
COMMUNITY
Start: 2021-11-17 | End: 2023-12-19 | Stop reason: ENTERED-IN-ERROR

## 2023-10-27 RX ORDER — DOXEPIN HYDROCHLORIDE 10 MG/1
CAPSULE ORAL
COMMUNITY
End: 2023-12-19 | Stop reason: ENTERED-IN-ERROR

## 2023-10-27 RX ORDER — RISEDRONATE SODIUM 30 MG/1
30 TABLET, FILM COATED ORAL
COMMUNITY
End: 2023-11-28 | Stop reason: ALTCHOICE

## 2023-10-27 RX ORDER — PSYLLIUM HUSK 0.4 G
CAPSULE ORAL
COMMUNITY
Start: 2020-07-08 | End: 2023-12-19 | Stop reason: ENTERED-IN-ERROR

## 2023-10-27 RX ORDER — MIRABEGRON 25 MG/1
25 TABLET, FILM COATED, EXTENDED RELEASE ORAL DAILY
COMMUNITY
Start: 2023-10-11 | End: 2023-11-10

## 2023-10-27 RX ORDER — GABAPENTIN 400 MG/1
400 CAPSULE ORAL 2 TIMES DAILY
COMMUNITY
Start: 2023-01-13 | End: 2023-12-19 | Stop reason: ENTERED-IN-ERROR

## 2023-10-27 RX ORDER — ALPRAZOLAM 0.5 MG/1
0.5 TABLET ORAL 3 TIMES DAILY PRN
COMMUNITY
End: 2023-12-19 | Stop reason: ENTERED-IN-ERROR

## 2023-10-27 RX ORDER — POLYETHYLENE GLYCOL 3350 17 G/17G
POWDER, FOR SOLUTION ORAL
COMMUNITY
Start: 2020-07-08 | End: 2023-12-19 | Stop reason: ENTERED-IN-ERROR

## 2023-10-27 RX ORDER — HYDROXYZINE HYDROCHLORIDE 25 MG/1
50 TABLET, FILM COATED ORAL NIGHTLY
COMMUNITY
Start: 2017-11-21 | End: 2023-12-21 | Stop reason: HOSPADM

## 2023-10-27 RX ORDER — SUMATRIPTAN 50 MG/1
50 TABLET, FILM COATED ORAL ONCE AS NEEDED
COMMUNITY
End: 2023-12-19 | Stop reason: ENTERED-IN-ERROR

## 2023-10-27 ASSESSMENT — PAIN DESCRIPTION - PAIN TYPE: TYPE: CHRONIC PAIN

## 2023-10-27 ASSESSMENT — PAIN - FUNCTIONAL ASSESSMENT: PAIN_FUNCTIONAL_ASSESSMENT: 0-10

## 2023-10-27 ASSESSMENT — LIFESTYLE VARIABLES
HAVE PEOPLE ANNOYED YOU BY CRITICIZING YOUR DRINKING: NO
REASON UNABLE TO ASSESS: NO
EVER HAD A DRINK FIRST THING IN THE MORNING TO STEADY YOUR NERVES TO GET RID OF A HANGOVER: NO
EVER FELT BAD OR GUILTY ABOUT YOUR DRINKING: NO
HAVE YOU EVER FELT YOU SHOULD CUT DOWN ON YOUR DRINKING: NO

## 2023-10-27 ASSESSMENT — COLUMBIA-SUICIDE SEVERITY RATING SCALE - C-SSRS
6. HAVE YOU EVER DONE ANYTHING, STARTED TO DO ANYTHING, OR PREPARED TO DO ANYTHING TO END YOUR LIFE?: NO
1. IN THE PAST MONTH, HAVE YOU WISHED YOU WERE DEAD OR WISHED YOU COULD GO TO SLEEP AND NOT WAKE UP?: NO
2. HAVE YOU ACTUALLY HAD ANY THOUGHTS OF KILLING YOURSELF?: NO

## 2023-10-27 ASSESSMENT — PAIN SCALES - GENERAL: PAINLEVEL_OUTOF10: 8

## 2023-10-27 ASSESSMENT — PAIN DESCRIPTION - LOCATION: LOCATION: BACK

## 2023-10-27 NOTE — TELEPHONE ENCOUNTER
Pt left message late last night stating she went to get up to go to have a BM and realized she was all wet. There was still urine in the bag as well but she wasn't feeling all that well. This morning her daughter Cami called and states they figured out it was the Pyridium that was making her sick and wanted us to know that and asked if there is something else they can try. Please advise, [phone]

## 2023-10-27 NOTE — ED PROVIDER NOTES
HPI   Chief Complaint   Patient presents with    Urinary Retention       73-year-old female presenting to [facility] ED with complaint of leakage around her Mathias catheter.  She reports waking up this morning and noticing some urination on her bed.  Patient changed her close and there has not been leakage on her underwear since.  Reports Mathias catheter placed due to urinary retention and UTI in which she is taking Bactrim for and has a removal trial in 3 days.  Denies fevers, palpitations, shortness of breath, lightheadedness, or weakness.      History provided by:  Patient and relative                      Thais Coma Scale Score: 15         NIH Stroke Scale: 0          Patient History   Past Medical History:   Diagnosis Date    Drusen (degenerative) of macula, unspecified eye 02/19/2015    Macular drusen     Past Surgical History:   Procedure Laterality Date    OTHER SURGICAL HISTORY  03/06/2020    Rectocele and cystocele repair     No family history on file.  Social History     Tobacco Use    Smoking status: Never    Smokeless tobacco: Never   Substance Use Topics    Alcohol use: Not on file    Drug use: Not on file       Physical Exam   ED Triage Vitals [10/27/23 1016]   Temp Heart Rate Resp BP   36.9 °C (98.4 °F) 88 18 150/70      SpO2 Temp Source Heart Rate Source Patient Position   95 % Temporal Monitor Lying      BP Location FiO2 (%)     Right arm --       Physical Exam  Vitals and nursing note reviewed.   Constitutional:       General: She is awake. She is not in acute distress.     Appearance: Normal appearance. She is well-developed.   HENT:      Head: Normocephalic and atraumatic.      Right Ear: External ear normal.      Left Ear: External ear normal.      Nose: Nose normal. No congestion or rhinorrhea.      Mouth/Throat:      Mouth: Mucous membranes are moist.      Pharynx: Oropharynx is clear. No posterior oropharyngeal erythema.   Eyes:      General: No scleral icterus.        Right eye: No discharge.          Left eye: No discharge.      Extraocular Movements: Extraocular movements intact.      Conjunctiva/sclera: Conjunctivae normal.   Cardiovascular:      Rate and Rhythm: Normal rate and regular rhythm.      Pulses: Normal pulses.      Heart sounds: Normal heart sounds. No murmur heard.     No friction rub.   Pulmonary:      Effort: Pulmonary effort is normal. No respiratory distress.      Breath sounds: Normal breath sounds. No stridor. No wheezing or rales.   Abdominal:      General: There is no distension.      Palpations: Abdomen is soft.      Tenderness: There is no abdominal tenderness. There is no right CVA tenderness, left CVA tenderness, guarding or rebound.   Musculoskeletal:         General: No swelling or tenderness.      Cervical back: Normal range of motion and neck supple.      Right lower leg: No edema.      Left lower leg: No edema.   Skin:     General: Skin is warm and dry.      Capillary Refill: Capillary refill takes less than 2 seconds.      Coloration: Skin is not jaundiced or pale.      Findings: No lesion or rash.   Neurological:      General: No focal deficit present.      Mental Status: She is alert and oriented to person, place, and time. Mental status is at baseline.      Cranial Nerves: No cranial nerve deficit.      Sensory: No sensory deficit.      Motor: No weakness.   Psychiatric:         Mood and Affect: Mood normal.         Behavior: Behavior normal. Behavior is cooperative.         Thought Content: Thought content normal.         Judgment: Judgment normal.         ED Course & MDM   Diagnoses as of 10/28/23 0011   Mathias catheter in place       Medical Decision Making  73-year-old female with a history mention above presenting to the ED with complaint of episode of urine leakage despite Mathias catheter.    Patient is afebrile, hemodynamically stable on room air, and in no acute distress.  Physical exam findings mentioned above.  Bedside ultrasound of the bladder obtained.   Bedside ultrasound reveals minimal urine in bladder (<100cc) suggestive of functioning Mathias.  Discussed with patient likelihood of displacement of Mathias catheter while moving in bed causing a small amount of leakage as there has been no leakage throughout the day.    Disposition: Discussed with patient agreed discharge.  She will follow-up with her urologist as scheduled and continue her antibiotics.  Strict return precautions provided.        Procedure  Procedures     Max Haro MD  Resident  10/28/23 0021

## 2023-10-27 NOTE — DISCHARGE INSTRUCTIONS
Seek immediate medical attention should you have:  Abdominal pain, wet panties throughout the day, fevers, shortness of breath, weakness, confusion, vomiting, or any worsening or concerning symptoms.

## 2023-10-30 ENCOUNTER — OFFICE VISIT (OUTPATIENT)
Dept: UROLOGY | Facility: CLINIC | Age: 73
End: 2023-10-30
Payer: MEDICARE

## 2023-10-30 VITALS
WEIGHT: 150 LBS | SYSTOLIC BLOOD PRESSURE: 126 MMHG | HEART RATE: 78 BPM | BODY MASS INDEX: 24.21 KG/M2 | DIASTOLIC BLOOD PRESSURE: 70 MMHG

## 2023-10-30 DIAGNOSIS — R33.9 URINARY RETENTION: Primary | ICD-10-CM

## 2023-10-30 DIAGNOSIS — N30.00 ACUTE CYSTITIS WITHOUT HEMATURIA: ICD-10-CM

## 2023-10-30 PROCEDURE — 1125F AMNT PAIN NOTED PAIN PRSNT: CPT | Performed by: NURSE PRACTITIONER

## 2023-10-30 PROCEDURE — 51700 IRRIGATION OF BLADDER: CPT | Performed by: NURSE PRACTITIONER

## 2023-10-30 PROCEDURE — 99213 OFFICE O/P EST LOW 20 MIN: CPT | Performed by: NURSE PRACTITIONER

## 2023-10-30 PROCEDURE — 81001 URINALYSIS AUTO W/SCOPE: CPT

## 2023-10-30 PROCEDURE — 1159F MED LIST DOCD IN RCRD: CPT | Performed by: NURSE PRACTITIONER

## 2023-10-30 PROCEDURE — 1036F TOBACCO NON-USER: CPT | Performed by: NURSE PRACTITIONER

## 2023-10-30 NOTE — PROGRESS NOTES
10/30/23   [id number]    TOV     Subjective      HPI Demetria Enriquez is a 73 y.o. female who presents for TOV; Failed unable to void w 250 ml instilled in bladder, very large bm w TOV; pericare given multiple times;     Recap had been seen in ER for urinary retention on 10/22/23;  had not urinated 13 hrs, 1 liter when cath placed; recent UTI, finished bactrim. MRI done showed degenerative changes but no high grade central canal or foraminal stenosis identified; CT abd/pel 10/12/23 no hydro, no stones, mild bladder distention, constipation;      6/17/23 Ecoli + culture,  no culture earlier in month noted on chart;     Biggest issue is pain in back piriformis muscle, sciaticaPatient ID: Demetria Enriquez is a 73 y.o. female.    Bladder Catheterization    Date/Time: 10/30/2023 6:18 PM    Performed by: TRACIE Tapia  Authorized by: TRACIE Tapia    Procedure Details    Procedure: catheter insertion and bladder irrigation      Catheter insertion: indwelling      Catheter type: Mathias      Catheter size: 16 Fr    Complexity: simple      Number of initial attempts: 1    Urine characteristics: clear    Balloon inflation amount (mL): 10    Leg bag attached: yes      Post-Procedure Details     Outcome: patient tolerated procedure with difficulty              Objective     /70   Pulse 78   Wt 68 kg (150 lb)   BMI 24.21 kg/m²    Physical Exam  General: Appears comfortable and in no apparent distress, well nourished  Head: Normocephalic, atraumatic  Neck: trachea midline  Respiratory: respirations unlabored, no wheezes, and no use of accessory muscles  Cardiovascular: at rest no dyspnea, well perfused  Skin: no visible rashes or lesions  Neurologic: grossly intact, oriented to person, place, and time  Psychiatric: mood and affect appropriate  Musculoskeletal: in wheel chair for appt. no difficulty w upper body movement    No obvious prolapse, lots of pablo care given;     Assessment/Plan   Problem List  Items Addressed This Visit          Genitourinary and Reproductive    Urinary retention - Primary    Relevant Orders    Urine culture    Urinalysis with Reflex Microscopic     Other Visit Diagnoses       Acute cystitis without hematuria              Orders Placed This Encounter   Procedures    Urine culture     Standing Status:   Future     Number of Occurrences:   1     Standing Expiration Date:   11/6/2023     Order Specific Question:   Release result to MyChart     Answer:   Immediate [1]    Urinalysis with Reflex Microscopic     Standing Status:   Future     Number of Occurrences:   1     Standing Expiration Date:   10/30/2024     Order Specific Question:   Release result to MyChart     Answer:   Immediate [1]      Repeat TOV 2 weeks  Coninue tamsulosin  Hopeful once pain relieved, injection this Friday will do better w TOV         Tuyet Wahl, APRN-CNP  Lab Results   Component Value Date    GLUCOSE 117 (H) 10/11/2023    CALCIUM 9.9 10/11/2023     (L) 10/11/2023    K 3.5 10/11/2023    CO2 22 10/11/2023    CL 96 (L) 10/11/2023    BUN 20 10/11/2023    CREATININE 0.65 10/11/2023

## 2023-10-30 NOTE — PATIENT INSTRUCTIONS
TOV 2 weeks  Catheter reinserted, couldn't urinate, no obvious prolapse,  Hopeful once pain injection for back, will do better w TOV  Nurse line [phone]

## 2023-10-31 LAB
APPEARANCE UR: CLEAR
BACTERIA #/AREA URNS AUTO: ABNORMAL /HPF
BILIRUB UR STRIP.AUTO-MCNC: NEGATIVE MG/DL
COLOR UR: ABNORMAL
GLUCOSE UR STRIP.AUTO-MCNC: NEGATIVE MG/DL
KETONES UR STRIP.AUTO-MCNC: NEGATIVE MG/DL
LEUKOCYTE ESTERASE UR QL STRIP.AUTO: ABNORMAL
NITRITE UR QL STRIP.AUTO: NEGATIVE
PH UR STRIP.AUTO: 7 [PH]
PROT UR STRIP.AUTO-MCNC: NEGATIVE MG/DL
RBC # UR STRIP.AUTO: ABNORMAL /UL
RBC #/AREA URNS AUTO: ABNORMAL /HPF
SP GR UR STRIP.AUTO: 1
UROBILINOGEN UR STRIP.AUTO-MCNC: <2 MG/DL
WBC #/AREA URNS AUTO: ABNORMAL /HPF

## 2023-11-02 ENCOUNTER — APPOINTMENT (OUTPATIENT)
Dept: SURGERY | Facility: CLINIC | Age: 73
End: 2023-11-02
Payer: MEDICARE

## 2023-11-15 ENCOUNTER — OFFICE VISIT (OUTPATIENT)
Dept: UROLOGY | Facility: CLINIC | Age: 73
End: 2023-11-15
Payer: MEDICARE

## 2023-11-15 VITALS
BODY MASS INDEX: 23.78 KG/M2 | WEIGHT: 148 LBS | SYSTOLIC BLOOD PRESSURE: 147 MMHG | HEART RATE: 108 BPM | HEIGHT: 66 IN | TEMPERATURE: 96.2 F | DIASTOLIC BLOOD PRESSURE: 73 MMHG

## 2023-11-15 DIAGNOSIS — N30.00 ACUTE CYSTITIS WITHOUT HEMATURIA: ICD-10-CM

## 2023-11-15 DIAGNOSIS — R33.9 URINARY RETENTION: Primary | ICD-10-CM

## 2023-11-15 PROCEDURE — 1159F MED LIST DOCD IN RCRD: CPT | Performed by: NURSE PRACTITIONER

## 2023-11-15 PROCEDURE — 51700 IRRIGATION OF BLADDER: CPT | Performed by: NURSE PRACTITIONER

## 2023-11-15 PROCEDURE — 1125F AMNT PAIN NOTED PAIN PRSNT: CPT | Performed by: NURSE PRACTITIONER

## 2023-11-15 PROCEDURE — 1036F TOBACCO NON-USER: CPT | Performed by: NURSE PRACTITIONER

## 2023-11-15 PROCEDURE — 99213 OFFICE O/P EST LOW 20 MIN: CPT | Performed by: NURSE PRACTITIONER

## 2023-11-15 RX ORDER — TAMSULOSIN HYDROCHLORIDE 0.4 MG/1
0.8 CAPSULE ORAL NIGHTLY
Qty: 60 CAPSULE | Refills: 3 | Status: ON HOLD | OUTPATIENT
Start: 2023-11-15 | End: 2024-02-25 | Stop reason: SDUPTHER

## 2023-11-15 RX ORDER — TIZANIDINE 4 MG/1
4 TABLET ORAL EVERY 8 HOURS PRN
COMMUNITY
Start: 2023-11-07 | End: 2023-12-21 | Stop reason: HOSPADM

## 2023-11-15 NOTE — PATIENT INSTRUCTIONS
Empty partially, will come back for PVR this afternoon 3 pm  If not emptying will replace catheter this afternoon  Has two more injections for back pain planned  Constipation under control  If uncomfortable, come back sooner today  Nurse line [phone]

## 2023-11-15 NOTE — PROGRESS NOTES
"11/15/23   [id number]    TOV, follow up retention     Subjective      HPI Demetria Enriquez is a 73 y.o. female who presents for TOV; only able to urinate small amount w TOV, hesitancy, no constipation now, managed; but SI joint down to sacrum hurting, so aggravated, difficulty w walking, has had one back injection, two more injections planned this Friday; emptied bladder only partially this morning, will come back for PVR this afternoon.    Tolerating flomax well; no dizziness;        Recap had been seen in ER for urinary retention on 10/22/23;  had not urinated 13 hrs, 1 liter when cath placed; recent UTI, finished bactrim. MRI done showed degenerative changes but no high grade central canal or foraminal stenosis identified; CT abd/pel 10/12/23 no hydro, no stones, mild bladder distention, constipation; failed multiple TOV.     6/17/23 Ecoli + culture,  no culture earlier in month noted on chart;      Biggest issue is pain in back piriformis muscle, sciatica    Patient ID: Demetria Enriquez is a 73 y.o. female.    Objective     /73   Pulse 108   Temp 35.7 °C (96.2 °F)   Ht 1.676 m (5' 6\")   Wt 67.1 kg (148 lb)   BMI 23.89 kg/m²    Physical Exam  General: Appears comfortable and in no apparent distress, well nourished  Head: Normocephalic, atraumatic  Neck: trachea midline  Respiratory: respirations unlabored, no wheezes, and no use of accessory muscles  Cardiovascular: at rest no dyspnea, well perfused  Skin: no visible rashes or lesions  Neurologic: grossly intact, oriented to person, place, and time  Psychiatric: mood and affect appropriate  Musculoskeletal: in chair for appt. no difficulty w upper body movement      Assessment/Plan   Problem List Items Addressed This Visit          Genitourinary and Reproductive    Urinary retention - Primary    Relevant Medications    tamsulosin (Flomax) 0.4 mg 24 hr capsule    Other Relevant Orders    Voiding Trial (Completed)     Other Visit Diagnoses       Acute " cystitis without hematuria              Orders Placed This Encounter   Procedures    Voiding Trial      Empty partially, will come back for PVR this afternoon 3 pm  If not emptying will replace catheter this afternoon  Has two more injections for back pain planned  Constipation under control  If uncomfortable, come back sooner today  Nurse line [phone]       *came back afternoon, emptying bladder, PVR 71 ml after drinking all day urinating at home well;     Tuyet Wahl, APRN-CNP  Lab Results   Component Value Date    GLUCOSE 117 (H) 10/11/2023    CALCIUM 9.9 10/11/2023     (L) 10/11/2023    K 3.5 10/11/2023    CO2 22 10/11/2023    CL 96 (L) 10/11/2023    BUN 20 10/11/2023    CREATININE 0.65 10/11/2023

## 2023-11-28 ENCOUNTER — OFFICE VISIT (OUTPATIENT)
Dept: UROLOGY | Facility: CLINIC | Age: 73
End: 2023-11-28
Payer: MEDICARE

## 2023-11-28 VITALS
SYSTOLIC BLOOD PRESSURE: 168 MMHG | BODY MASS INDEX: 25.61 KG/M2 | HEART RATE: 92 BPM | HEIGHT: 64 IN | WEIGHT: 150 LBS | DIASTOLIC BLOOD PRESSURE: 89 MMHG

## 2023-11-28 DIAGNOSIS — N30.00 ACUTE CYSTITIS WITHOUT HEMATURIA: ICD-10-CM

## 2023-11-28 DIAGNOSIS — R33.9 URINARY RETENTION: Primary | ICD-10-CM

## 2023-11-28 LAB
POC APPEARANCE, URINE: ABNORMAL
POC BILIRUBIN, URINE: NEGATIVE
POC BLOOD, URINE: NEGATIVE
POC COLOR, URINE: YELLOW
POC GLUCOSE, URINE: NEGATIVE MG/DL
POC KETONES, URINE: ABNORMAL MG/DL
POC LEUKOCYTES, URINE: ABNORMAL
POC NITRITE,URINE: POSITIVE
POC PH, URINE: 5.5 PH
POC PROTEIN, URINE: NEGATIVE MG/DL
POC SPECIFIC GRAVITY, URINE: 1.02
POC UROBILINOGEN, URINE: 0.2 EU/DL

## 2023-11-28 PROCEDURE — 1036F TOBACCO NON-USER: CPT | Performed by: NURSE PRACTITIONER

## 2023-11-28 PROCEDURE — 81001 URINALYSIS AUTO W/SCOPE: CPT

## 2023-11-28 PROCEDURE — 51798 US URINE CAPACITY MEASURE: CPT | Performed by: NURSE PRACTITIONER

## 2023-11-28 PROCEDURE — 1159F MED LIST DOCD IN RCRD: CPT | Performed by: NURSE PRACTITIONER

## 2023-11-28 PROCEDURE — 81003 URINALYSIS AUTO W/O SCOPE: CPT | Performed by: NURSE PRACTITIONER

## 2023-11-28 PROCEDURE — 99213 OFFICE O/P EST LOW 20 MIN: CPT | Performed by: NURSE PRACTITIONER

## 2023-11-28 PROCEDURE — 1125F AMNT PAIN NOTED PAIN PRSNT: CPT | Performed by: NURSE PRACTITIONER

## 2023-11-28 PROCEDURE — 87086 URINE CULTURE/COLONY COUNT: CPT

## 2023-11-28 PROCEDURE — 87186 SC STD MICRODIL/AGAR DIL: CPT

## 2023-11-28 RX ORDER — SULFAMETHOXAZOLE AND TRIMETHOPRIM 800; 160 MG/1; MG/1
1 TABLET ORAL 2 TIMES DAILY
Qty: 14 TABLET | Refills: 0 | Status: SHIPPED | OUTPATIENT
Start: 2023-11-28 | End: 2023-12-01 | Stop reason: ALTCHOICE

## 2023-11-28 NOTE — PATIENT INSTRUCTIONS
Doing well w PVR now, but urine appears slightly infected  Culture sent, bactrim empirically w recent issues w UTI  Follow up 6 weeks  Didn't tolerate estrogen cream before caused itching  Constipation managed well w MiraLAX and prunes  Dmanose 2000 mg daily (capsules or powder to make drink amazon)  Nurse line [phone]

## 2023-11-28 NOTE — PROGRESS NOTES
"11/28/23   [id number]    PVR, follow up urinary retention     Subjective      HPI Demetria Enriquez is a 73 y.o. female who presents for follow up recent urinary retention, PVR; passed TOV on 11/15/23; Recent urinary retention developed w fall, back issues and UTI; so happy to no longer have difficulty urinating, PVR 29 ml, but urine does appear sl infected despite no concerning symptoms; will treat empirically as such difficulty w last UTI and start Dmanose, not able to do estrogen cream as continues to get yeast when uses;         Objective     /89   Pulse 92   Ht 1.626 m (5' 4\")   Wt 68 kg (150 lb)   BMI 25.75 kg/m²    Physical Exam  General: Appears comfortable and in no apparent distress, well nourished  Head: Normocephalic, atraumatic  Neck: trachea midline  Respiratory: respirations unlabored, no wheezes, and no use of accessory muscles  Cardiovascular: at rest no dyspnea, well perfused  Skin: no visible rashes or lesions  Neurologic: grossly intact, oriented to person, place, and time  Psychiatric: mood and affect appropriate  Musculoskeletal: in chair for appt. no difficulty w upper body movement      Assessment/Plan   Problem List Items Addressed This Visit          Genitourinary and Reproductive    Urinary retention - Primary    Relevant Orders    POCT UA Automated manually resulted (Completed)    Post-Void Residual (Completed)     Other Visit Diagnoses       Acute cystitis without hematuria        Relevant Medications    sulfamethoxazole-trimethoprim (Bactrim DS) 800-160 mg tablet    Other Relevant Orders    Urine culture    Urinalysis with Reflex Microscopic          Orders Placed This Encounter   Procedures    Post-Void Residual    Urine culture     Standing Status:   Future     Number of Occurrences:   1     Standing Expiration Date:   12/5/2023     Order Specific Question:   Release result to [facility]     Answer:   Immediate [1]    Urinalysis with Reflex Microscopic     Standing Status:   " Future     Number of Occurrences:   1     Standing Expiration Date:   11/28/2024     Order Specific Question:   Release result to [facility]     Answer:   Immediate [1]    POCT UA Automated manually resulted     Order Specific Question:   Release result to [facility]     Answer:   Immediate [1]             Doing well w PVR now, but urine appears slightly infected  Culture sent, bactrim empirically w recent issues w UTI  Follow up 6 weeks  Didn't tolerate estrogen cream before caused itching  Constipation managed well w MiraLAX and prunes  Dmanose 2000 mg daily (capsules or powder to make drink amazon)  Nurse line [phone]  Lab Results   Component Value Date    GLUCOSE 117 (H) 10/11/2023    CALCIUM 9.9 10/11/2023     (L) 10/11/2023    K 3.5 10/11/2023    CO2 22 10/11/2023    CL 96 (L) 10/11/2023    BUN 20 10/11/2023    CREATININE 0.65 10/11/2023

## 2023-11-29 LAB
APPEARANCE UR: ABNORMAL
BILIRUB UR STRIP.AUTO-MCNC: NEGATIVE MG/DL
CAOX CRY #/AREA UR COMP ASSIST: ABNORMAL /HPF
COLOR UR: ABNORMAL
GLUCOSE UR STRIP.AUTO-MCNC: NEGATIVE MG/DL
HYALINE CASTS #/AREA URNS AUTO: ABNORMAL /LPF
KETONES UR STRIP.AUTO-MCNC: NEGATIVE MG/DL
LEUKOCYTE ESTERASE UR QL STRIP.AUTO: ABNORMAL
MUCOUS THREADS #/AREA URNS AUTO: ABNORMAL /LPF
NITRITE UR QL STRIP.AUTO: NEGATIVE
PH UR STRIP.AUTO: 5 [PH]
PROT UR STRIP.AUTO-MCNC: ABNORMAL MG/DL
RBC # UR STRIP.AUTO: NEGATIVE /UL
RBC #/AREA URNS AUTO: ABNORMAL /HPF
SP GR UR STRIP.AUTO: 1.03
SQUAMOUS #/AREA URNS AUTO: ABNORMAL /HPF
UROBILINOGEN UR STRIP.AUTO-MCNC: <2 MG/DL
WBC #/AREA URNS AUTO: >50 /HPF
WBC CLUMPS #/AREA URNS AUTO: ABNORMAL /HPF

## 2023-12-01 ENCOUNTER — TELEPHONE (OUTPATIENT)
Dept: UROLOGY | Facility: CLINIC | Age: 73
End: 2023-12-01
Payer: MEDICARE

## 2023-12-01 DIAGNOSIS — N30.00 ACUTE CYSTITIS WITHOUT HEMATURIA: Primary | ICD-10-CM

## 2023-12-01 LAB — BACTERIA UR CULT: ABNORMAL

## 2023-12-01 RX ORDER — NITROFURANTOIN 25; 75 MG/1; MG/1
100 CAPSULE ORAL 2 TIMES DAILY
Qty: 14 CAPSULE | Refills: 0 | Status: SHIPPED | OUTPATIENT
Start: 2023-12-01 | End: 2023-12-08

## 2023-12-01 NOTE — TELEPHONE ENCOUNTER
----- Message from TRACIE Tapia sent at 12/1/2023  1:34 PM EST -----  Pleae let patient know to please stop bactrim (resistant) start macrobid sensitive, take w food or milk to keep from being nausea;     Thank you!    ----- Message -----  From: Celina Torres MA  Sent: 11/28/2023   3:18 PM EST  To: TRACIE Tapia

## 2023-12-05 ENCOUNTER — TELEPHONE (OUTPATIENT)
Dept: UROLOGY | Facility: CLINIC | Age: 73
End: 2023-12-05
Payer: MEDICARE

## 2023-12-05 NOTE — TELEPHONE ENCOUNTER
Pt left message stating she is doing well on the abx but was asking if she should drop off another urine when she is done so we can make sure the infection clears. Please advise, thanks.

## 2023-12-06 DIAGNOSIS — N39.0 RECURRENT UTI: Primary | ICD-10-CM

## 2023-12-06 NOTE — TELEPHONE ENCOUNTER
Pt informed, orders placed in chart and pt states she will drop off at lab on either Saturday or Monday.

## 2023-12-08 ENCOUNTER — TELEPHONE (OUTPATIENT)
Dept: UROLOGY | Facility: CLINIC | Age: 73
End: 2023-12-08
Payer: MEDICARE

## 2023-12-08 ENCOUNTER — LAB (OUTPATIENT)
Dept: LAB | Facility: LAB | Age: 73
End: 2023-12-08
Payer: MEDICARE

## 2023-12-08 DIAGNOSIS — N39.0 RECURRENT UTI: ICD-10-CM

## 2023-12-08 LAB
HYALINE CASTS #/AREA URNS AUTO: ABNORMAL /LPF
MUCOUS THREADS #/AREA URNS AUTO: ABNORMAL /LPF
RBC #/AREA URNS AUTO: ABNORMAL /HPF
SQUAMOUS #/AREA URNS AUTO: ABNORMAL /HPF
WBC #/AREA URNS AUTO: ABNORMAL /HPF

## 2023-12-08 PROCEDURE — 81001 URINALYSIS AUTO W/SCOPE: CPT

## 2023-12-08 PROCEDURE — 87086 URINE CULTURE/COLONY COUNT: CPT

## 2023-12-08 NOTE — TELEPHONE ENCOUNTER
Pt left message stating the Macrobid gave her a bad rash and she went to her PCP who had her stop the Macrobid and start taking Zyrtec. She woke up today and the rash is gone. Pt states she only had one day left of the Macrobid course anyway. I added that to her allergy list and pt is going to be dropping off a urine at a  lab sometime this afternoon so we can check to make sure the infection is gone, thanks.

## 2023-12-10 LAB — BACTERIA UR CULT: NORMAL

## 2023-12-11 ENCOUNTER — TELEPHONE (OUTPATIENT)
Dept: UROLOGY | Facility: CLINIC | Age: 73
End: 2023-12-11
Payer: MEDICARE

## 2023-12-11 NOTE — TELEPHONE ENCOUNTER
----- Message from TRACIE Tapia sent at 12/11/2023  9:22 AM EST -----  No infection, cleared up. ty  ----- Message -----  From: Lab, Background User  Sent: 12/8/2023   5:57 PM EST  To: TRACIE Tapia

## 2023-12-15 ENCOUNTER — TELEPHONE (OUTPATIENT)
Dept: UROLOGY | Facility: CLINIC | Age: 73
End: 2023-12-15
Payer: MEDICARE

## 2023-12-15 NOTE — TELEPHONE ENCOUNTER
Pt left detailed message asking for us to call her back whenever we get a chance as another provider was asking her what caused her urinary retention in the first place and she does not remember. She can't remember if it started back when she fell or if it was something else and she asking if we have in her chart or her notes the original reason for the retention. Please advise, thanks.

## 2023-12-17 ENCOUNTER — APPOINTMENT (OUTPATIENT)
Dept: CARDIOLOGY | Facility: HOSPITAL | Age: 73
End: 2023-12-17
Payer: MEDICARE

## 2023-12-17 ENCOUNTER — HOSPITAL ENCOUNTER (EMERGENCY)
Facility: HOSPITAL | Age: 73
Discharge: HOME | End: 2023-12-18
Attending: EMERGENCY MEDICINE
Payer: MEDICARE

## 2023-12-17 ENCOUNTER — APPOINTMENT (OUTPATIENT)
Dept: RADIOLOGY | Facility: HOSPITAL | Age: 73
End: 2023-12-17
Payer: MEDICARE

## 2023-12-17 DIAGNOSIS — R10.84 GENERALIZED ABDOMINAL PAIN: Primary | ICD-10-CM

## 2023-12-17 DIAGNOSIS — K43.9 HERNIA OF ABDOMINAL WALL: ICD-10-CM

## 2023-12-17 LAB
ALBUMIN SERPL BCP-MCNC: 3.5 G/DL (ref 3.4–5)
ALP SERPL-CCNC: 89 U/L (ref 33–136)
ALT SERPL W P-5'-P-CCNC: 9 U/L (ref 7–45)
ANION GAP SERPL CALC-SCNC: 11 MMOL/L
APPEARANCE UR: CLEAR
AST SERPL W P-5'-P-CCNC: 17 U/L (ref 9–39)
BACTERIA #/AREA URNS AUTO: ABNORMAL /HPF
BASOPHILS # BLD AUTO: 0.05 X10*3/UL (ref 0–0.1)
BASOPHILS NFR BLD AUTO: 0.7 %
BILIRUB SERPL-MCNC: 0.4 MG/DL (ref 0–1.2)
BILIRUB UR STRIP.AUTO-MCNC: NEGATIVE MG/DL
BUN SERPL-MCNC: 16 MG/DL (ref 6–23)
CALCIUM SERPL-MCNC: 8.2 MG/DL (ref 8.6–10.3)
CARDIAC TROPONIN I PNL SERPL HS: 6 NG/L (ref 0–13)
CHLORIDE SERPL-SCNC: 101 MMOL/L (ref 98–107)
CO2 SERPL-SCNC: 22 MMOL/L (ref 21–32)
COLOR UR: YELLOW
CREAT SERPL-MCNC: 0.5 MG/DL (ref 0.5–1.05)
EOSINOPHIL # BLD AUTO: 0.01 X10*3/UL (ref 0–0.4)
EOSINOPHIL NFR BLD AUTO: 0.1 %
ERYTHROCYTE [DISTWIDTH] IN BLOOD BY AUTOMATED COUNT: 16.8 % (ref 11.5–14.5)
GFR SERPL CREATININE-BSD FRML MDRD: >90 ML/MIN/1.73M*2
GLUCOSE SERPL-MCNC: 100 MG/DL (ref 74–99)
GLUCOSE UR STRIP.AUTO-MCNC: NEGATIVE MG/DL
HCT VFR BLD AUTO: 34.7 % (ref 36–46)
HGB BLD-MCNC: 11.4 G/DL (ref 12–16)
IMM GRANULOCYTES # BLD AUTO: 0.02 X10*3/UL (ref 0–0.5)
IMM GRANULOCYTES NFR BLD AUTO: 0.3 % (ref 0–0.9)
KETONES UR STRIP.AUTO-MCNC: NEGATIVE MG/DL
LACTATE SERPL-SCNC: 1 MMOL/L (ref 0.4–2)
LEUKOCYTE ESTERASE UR QL STRIP.AUTO: NEGATIVE
LYMPHOCYTES # BLD AUTO: 2.24 X10*3/UL (ref 0.8–3)
LYMPHOCYTES NFR BLD AUTO: 30.1 %
MCH RBC QN AUTO: 28.9 PG (ref 26–34)
MCHC RBC AUTO-ENTMCNC: 32.9 G/DL (ref 32–36)
MCV RBC AUTO: 88 FL (ref 80–100)
MONOCYTES # BLD AUTO: 0.47 X10*3/UL (ref 0.05–0.8)
MONOCYTES NFR BLD AUTO: 6.3 %
NEUTROPHILS # BLD AUTO: 4.64 X10*3/UL (ref 1.6–5.5)
NEUTROPHILS NFR BLD AUTO: 62.5 %
NITRITE UR QL STRIP.AUTO: POSITIVE
NRBC BLD-RTO: 0 /100 WBCS (ref 0–0)
PH UR STRIP.AUTO: 7 [PH]
PLATELET # BLD AUTO: 286 X10*3/UL (ref 150–450)
POTASSIUM SERPL-SCNC: 3.4 MMOL/L (ref 3.5–5.3)
PROT SERPL-MCNC: 5.7 G/DL (ref 6.4–8.2)
PROT UR STRIP.AUTO-MCNC: NEGATIVE MG/DL
RBC # BLD AUTO: 3.95 X10*6/UL (ref 4–5.2)
RBC # UR STRIP.AUTO: NEGATIVE /UL
RBC #/AREA URNS AUTO: ABNORMAL /HPF
SODIUM SERPL-SCNC: 131 MMOL/L (ref 136–145)
SP GR UR STRIP.AUTO: 1.01
UROBILINOGEN UR STRIP.AUTO-MCNC: <2 MG/DL
WBC # BLD AUTO: 7.4 X10*3/UL (ref 4.4–11.3)
WBC #/AREA URNS AUTO: ABNORMAL /HPF

## 2023-12-17 PROCEDURE — 96374 THER/PROPH/DIAG INJ IV PUSH: CPT

## 2023-12-17 PROCEDURE — 84484 ASSAY OF TROPONIN QUANT: CPT | Performed by: STUDENT IN AN ORGANIZED HEALTH CARE EDUCATION/TRAINING PROGRAM

## 2023-12-17 PROCEDURE — 85025 COMPLETE CBC W/AUTO DIFF WBC: CPT | Performed by: STUDENT IN AN ORGANIZED HEALTH CARE EDUCATION/TRAINING PROGRAM

## 2023-12-17 PROCEDURE — 80053 COMPREHEN METABOLIC PANEL: CPT | Performed by: STUDENT IN AN ORGANIZED HEALTH CARE EDUCATION/TRAINING PROGRAM

## 2023-12-17 PROCEDURE — 99285 EMERGENCY DEPT VISIT HI MDM: CPT | Mod: 25

## 2023-12-17 PROCEDURE — 36415 COLL VENOUS BLD VENIPUNCTURE: CPT | Performed by: STUDENT IN AN ORGANIZED HEALTH CARE EDUCATION/TRAINING PROGRAM

## 2023-12-17 PROCEDURE — 81001 URINALYSIS AUTO W/SCOPE: CPT | Performed by: STUDENT IN AN ORGANIZED HEALTH CARE EDUCATION/TRAINING PROGRAM

## 2023-12-17 PROCEDURE — 2500000004 HC RX 250 GENERAL PHARMACY W/ HCPCS (ALT 636 FOR OP/ED): Performed by: STUDENT IN AN ORGANIZED HEALTH CARE EDUCATION/TRAINING PROGRAM

## 2023-12-17 PROCEDURE — 83605 ASSAY OF LACTIC ACID: CPT | Performed by: STUDENT IN AN ORGANIZED HEALTH CARE EDUCATION/TRAINING PROGRAM

## 2023-12-17 PROCEDURE — 74177 CT ABD & PELVIS W/CONTRAST: CPT

## 2023-12-17 PROCEDURE — 99284 EMERGENCY DEPT VISIT MOD MDM: CPT | Performed by: EMERGENCY MEDICINE

## 2023-12-17 PROCEDURE — 87086 URINE CULTURE/COLONY COUNT: CPT | Mod: STJLAB | Performed by: STUDENT IN AN ORGANIZED HEALTH CARE EDUCATION/TRAINING PROGRAM

## 2023-12-17 PROCEDURE — 93005 ELECTROCARDIOGRAM TRACING: CPT

## 2023-12-17 RX ORDER — ONDANSETRON HYDROCHLORIDE 2 MG/ML
4 INJECTION, SOLUTION INTRAVENOUS ONCE
Status: DISCONTINUED | OUTPATIENT
Start: 2023-12-17 | End: 2023-12-18 | Stop reason: HOSPADM

## 2023-12-17 RX ORDER — MORPHINE SULFATE 4 MG/ML
4 INJECTION, SOLUTION INTRAMUSCULAR; INTRAVENOUS ONCE
Status: COMPLETED | OUTPATIENT
Start: 2023-12-17 | End: 2023-12-17

## 2023-12-17 RX ADMIN — MORPHINE SULFATE 4 MG: 4 INJECTION, SOLUTION INTRAMUSCULAR; INTRAVENOUS at 23:05

## 2023-12-17 ASSESSMENT — LIFESTYLE VARIABLES
HAVE PEOPLE ANNOYED YOU BY CRITICIZING YOUR DRINKING: NO
EVER FELT BAD OR GUILTY ABOUT YOUR DRINKING: NO
REASON UNABLE TO ASSESS: NO
EVER HAD A DRINK FIRST THING IN THE MORNING TO STEADY YOUR NERVES TO GET RID OF A HANGOVER: NO
HAVE YOU EVER FELT YOU SHOULD CUT DOWN ON YOUR DRINKING: NO

## 2023-12-17 ASSESSMENT — PAIN SCALES - GENERAL: PAINLEVEL_OUTOF10: 10 - WORST POSSIBLE PAIN

## 2023-12-17 ASSESSMENT — PAIN DESCRIPTION - LOCATION
LOCATION: ABDOMEN
LOCATION: ABDOMEN

## 2023-12-17 ASSESSMENT — PAIN DESCRIPTION - PAIN TYPE: TYPE: ACUTE PAIN

## 2023-12-17 ASSESSMENT — PAIN - FUNCTIONAL ASSESSMENT
PAIN_FUNCTIONAL_ASSESSMENT: 0-10
PAIN_FUNCTIONAL_ASSESSMENT: 0-10

## 2023-12-18 VITALS
WEIGHT: 137 LBS | SYSTOLIC BLOOD PRESSURE: 151 MMHG | OXYGEN SATURATION: 98 % | DIASTOLIC BLOOD PRESSURE: 77 MMHG | HEART RATE: 71 BPM | HEIGHT: 66 IN | RESPIRATION RATE: 20 BRPM | TEMPERATURE: 98.1 F | BODY MASS INDEX: 22.02 KG/M2

## 2023-12-18 LAB
ATRIAL RATE: 72 BPM
CARDIAC TROPONIN I PNL SERPL HS: 9 NG/L (ref 0–13)
HOLD SPECIMEN: NORMAL
P AXIS: 44 DEGREES
P OFFSET: 183 MS
P ONSET: 132 MS
PR INTERVAL: 178 MS
Q ONSET: 221 MS
QRS COUNT: 12 BEATS
QRS DURATION: 82 MS
QT INTERVAL: 410 MS
QTC CALCULATION(BAZETT): 448 MS
QTC FREDERICIA: 435 MS
R AXIS: 30 DEGREES
T AXIS: 50 DEGREES
T OFFSET: 426 MS
VENTRICULAR RATE: 72 BPM

## 2023-12-18 PROCEDURE — 74177 CT ABD & PELVIS W/CONTRAST: CPT | Performed by: RADIOLOGY

## 2023-12-18 PROCEDURE — 36415 COLL VENOUS BLD VENIPUNCTURE: CPT | Performed by: STUDENT IN AN ORGANIZED HEALTH CARE EDUCATION/TRAINING PROGRAM

## 2023-12-18 PROCEDURE — 84484 ASSAY OF TROPONIN QUANT: CPT | Performed by: STUDENT IN AN ORGANIZED HEALTH CARE EDUCATION/TRAINING PROGRAM

## 2023-12-18 PROCEDURE — 2550000001 HC RX 255 CONTRASTS: Performed by: EMERGENCY MEDICINE

## 2023-12-18 RX ADMIN — IOHEXOL 75 ML: 350 INJECTION, SOLUTION INTRAVENOUS at 00:05

## 2023-12-18 NOTE — DISCHARGE INSTRUCTIONS
Please follow with your primary care physician to discuss your ER visit.  Please call Dr. Giraldo's office today to let them know that you are in the ER and that he should be seen sooner than January.  If you develop any severe abdominal pain, unable to reduce your hernia at home, or if you have any other concerns, please return to the ER for further care.

## 2023-12-18 NOTE — ED PROVIDER NOTES
HPI   Chief Complaint   Patient presents with    Abdominal Pain     Hernia popped out       73-year-old female who presents to the emergency department with acute onset epigastric abdominal pain associated with hernia.  She stated that she had this once before, it was reduced in the ER and relieved her pain.  However earlier this evening she was sitting on her couch when she noticed sudden onset bulge in her epigastric region, associated with significant pain.  She has not vomited.  She has no fevers.  She still passing gas and moving her bowels.  She was given 50 mcg of fentanyl per squad twice before arrival to the emergency department and is continuing to have significant pain.                          [place] Coma Scale Score: 15                  Patient History   Past Medical History:   Diagnosis Date    Drusen (degenerative) of macula, unspecified eye 02/19/2015    Macular drusen     Past Surgical History:   Procedure Laterality Date    OTHER SURGICAL HISTORY  03/06/2020    Rectocele and cystocele repair     No family history on file.  Social History     Tobacco Use    Smoking status: Never    Smokeless tobacco: Never   Vaping Use    Vaping Use: Never used   Substance Use Topics    Alcohol use: Defer    Drug use: Defer       Physical Exam   ED Triage Vitals [12/17/23 2200]   Temp Heart Rate Resp BP   36.7 °C (98.1 °F) 82 20 (!) 199/99      SpO2 Temp src Heart Rate Source Patient Position   99 % -- -- --      BP Location FiO2 (%)     -- --       Physical Exam  Vitals and nursing note reviewed.   Constitutional:       General: She is not in acute distress.     Appearance: She is well-developed.   HENT:      Head: Normocephalic and atraumatic.   Eyes:      Conjunctiva/sclera: Conjunctivae normal.   Cardiovascular:      Rate and Rhythm: Normal rate and regular rhythm.      Heart sounds: No murmur heard.  Pulmonary:      Effort: Pulmonary effort is normal. No respiratory distress.      Breath sounds: Normal breath  sounds.   Abdominal:      General: Bowel sounds are normal.      Palpations: Abdomen is soft.      Tenderness: There is abdominal tenderness in the epigastric area.      Hernia: A hernia is present. Hernia is present in the ventral area.   Musculoskeletal:         General: No swelling.      Cervical back: Neck supple.   Skin:     General: Skin is warm and dry.      Capillary Refill: Capillary refill takes less than 2 seconds.   Neurological:      Mental Status: She is alert.   Psychiatric:         Mood and Affect: Mood normal.         ED Course & MDM        Medical Decision Making  History obtained from: Patient    External records reviewed: I reviewed external records including outpatient, PCP records, and prior discharge summaries    ED Course: Last occurred 2 months ago.  Has follow-up scheduled already shortly after the new year with surgery for evaluation and repair.  Attempted to reduce at the bedside with direct pressure, relief of approximately half of the size of the hernia, but unable to fully reduce at this time due to patient's discomfort.  Placed I placed back on that area and placed in Trendelenburg.  Basic labs and lactate ordered and plan to send for CT of the abdomen and pelvis to assess for burgeoning obstruction or strangulation.    Ordered morphine for this patient.  Signed out to the oncoming resident, Dr. Lewis, pending repeat attempt to reduce hernia.    I have reviewed this case with the ED attending physician, and the attending agrees with the plan. Patient or family was counselled regarding labs, imaging, likely diagnosis, and plan. All questions were answered.     Tanvi Hodges DO  PGY-4, emergency medicine    The above documentation was completed with the use of speech recognition software. It may contain dictation errors secondary to limitations of the software.              Procedure  Procedures     Tanvi Hodges DO  Resident  12/17/23 1768

## 2023-12-18 NOTE — ED PROVIDER NOTES
Emergency Medicine Transition of Care Note.    I received Demetria Enriquez in signout from Dr. Hodges.  Please see the previous ED provider note for all HPI, PE and MDM up to the time of signout at 0000. This is in addition to the primary record.    In brief Demetria Enriquez is an 73 y.o. female presenting for   Chief Complaint   Patient presents with   • Abdominal Pain     Hernia popped out     At the time of signout we were awaiting: labs and imaging results    ED Course as of 12/18/23 0256   Mon Dec 18, 2023   0027 Patient was signed out to me by Dr. Hodges, ongoing resident pending CT imaging and lab work. [MJ]   0027 HEMOGLOBIN(!): 11.4 [MJ]   0027 WBC: 7.4 [MJ]   0027 Platelets: 286 [MJ]   0027 Lactate: 1.0 [MJ]   0027 Troponin I, High Sensitivity: 6  Low suspicion for ACS [MJ]   0027 Nitrite, Urine(!): POSITIVE [MJ]   0027 Bacteria, Urine(!): 1+  My attending wanted to hold on antibiotic coverage for UTI at this time as he felt clinically patient did not have a urinary tract infection. Urine culture was sent.  [MJ]   0232 On my reassessment of patient, hernia was reduced on clinical exam.  She is not having any abdominal pain. [MJ]   0232 CT abdomen pelvis w IV contrast  IMPRESSION:  Moderate to large colonic stool burden; please correlate for  constipation. There is also evidence of fecalization of small bowel  loops compatible with bowel stasis and mild small bowel dilatation  with may be secondary to ileus. No definite evidence of transition  point.      Bilateral comminuted subacute sacral ala fractures with sclerosis of  the sacrum. Subacute displaced fracture at S3 with acute angulation  of the sacrum. Subacute fractures of right superior and inferior  pubic ramus with increased displacement of right inferior pubic ramus  fracture.      4.9 cm x 2.2 cm and 6 cm x 2.1 cm fat containing anterior abdominal  hernias with mild edema and fluid in the inferior hernia.   [MJ]   0232 Case was discussed with   Gualberto from surgery who felt that patient was appropriate for discharge and outpatient follow-up with Dr. Giraldo.  He did recommend the patient call the office for close follow-up for earlier appointment date.  Strict return precautions were given.  They were understanding and agreeable with plan for discharge. [MJ]      ED Course User Index  [MJ] Carmelo Lewis DO         Diagnoses as of 12/18/23 0256   Generalized abdominal pain   Hernia of abdominal wall       Medical Decision Making      Final diagnoses:   [R10.84] Generalized abdominal pain   [K43.9] Hernia of abdominal wall           Procedure  Procedures    DO Carmelo Schulz DO  Resident  12/18/23 0256

## 2023-12-19 ENCOUNTER — HOSPITAL ENCOUNTER (OUTPATIENT)
Facility: HOSPITAL | Age: 73
Setting detail: OUTPATIENT SURGERY
End: 2023-12-19
Attending: SURGERY | Admitting: SURGERY
Payer: MEDICARE

## 2023-12-19 ENCOUNTER — HOSPITAL ENCOUNTER (INPATIENT)
Facility: HOSPITAL | Age: 73
LOS: 2 days | Discharge: HOME | DRG: 355 | End: 2023-12-21
Attending: EMERGENCY MEDICINE | Admitting: SURGERY
Payer: MEDICARE

## 2023-12-19 ENCOUNTER — ANESTHESIA EVENT (OUTPATIENT)
Dept: OPERATING ROOM | Facility: HOSPITAL | Age: 73
DRG: 355 | End: 2023-12-19
Payer: MEDICARE

## 2023-12-19 ENCOUNTER — ANESTHESIA (OUTPATIENT)
Dept: OPERATING ROOM | Facility: HOSPITAL | Age: 73
DRG: 355 | End: 2023-12-19
Payer: MEDICARE

## 2023-12-19 ENCOUNTER — OFFICE VISIT (OUTPATIENT)
Dept: SURGERY | Facility: CLINIC | Age: 73
End: 2023-12-19
Payer: MEDICARE

## 2023-12-19 VITALS
SYSTOLIC BLOOD PRESSURE: 119 MMHG | OXYGEN SATURATION: 98 % | RESPIRATION RATE: 16 BRPM | BODY MASS INDEX: 22.36 KG/M2 | WEIGHT: 139.13 LBS | HEART RATE: 101 BPM | DIASTOLIC BLOOD PRESSURE: 83 MMHG | HEIGHT: 66 IN | TEMPERATURE: 97.2 F

## 2023-12-19 DIAGNOSIS — Z01.818 PRE-OP EXAMINATION: ICD-10-CM

## 2023-12-19 DIAGNOSIS — K43.6 STRANGULATED VENTRAL HERNIA: Primary | ICD-10-CM

## 2023-12-19 DIAGNOSIS — K43.9 VENTRAL HERNIA WITHOUT OBSTRUCTION OR GANGRENE: Primary | ICD-10-CM

## 2023-12-19 DIAGNOSIS — K43.9 VENTRAL HERNIA WITHOUT OBSTRUCTION OR GANGRENE: ICD-10-CM

## 2023-12-19 LAB
ALBUMIN SERPL BCP-MCNC: 4.2 G/DL (ref 3.4–5)
ALP SERPL-CCNC: 112 U/L (ref 33–136)
ALT SERPL W P-5'-P-CCNC: 9 U/L (ref 7–45)
ANION GAP SERPL CALC-SCNC: 16 MMOL/L (ref 10–20)
AST SERPL W P-5'-P-CCNC: 19 U/L (ref 9–39)
BASOPHILS # BLD AUTO: 0.06 X10*3/UL (ref 0–0.1)
BASOPHILS NFR BLD AUTO: 0.6 %
BILIRUB SERPL-MCNC: 0.5 MG/DL (ref 0–1.2)
BUN SERPL-MCNC: 14 MG/DL (ref 6–23)
CALCIUM SERPL-MCNC: 9.5 MG/DL (ref 8.6–10.3)
CHLORIDE SERPL-SCNC: 94 MMOL/L (ref 98–107)
CO2 SERPL-SCNC: 20 MMOL/L (ref 21–32)
CREAT SERPL-MCNC: 0.59 MG/DL (ref 0.5–1.05)
EOSINOPHIL # BLD AUTO: 0.04 X10*3/UL (ref 0–0.4)
EOSINOPHIL NFR BLD AUTO: 0.4 %
ERYTHROCYTE [DISTWIDTH] IN BLOOD BY AUTOMATED COUNT: 16.8 % (ref 11.5–14.5)
GFR SERPL CREATININE-BSD FRML MDRD: >90 ML/MIN/1.73M*2
GLUCOSE SERPL-MCNC: 95 MG/DL (ref 74–99)
HCT VFR BLD AUTO: 36 % (ref 36–46)
HGB BLD-MCNC: 12 G/DL (ref 12–16)
IMM GRANULOCYTES # BLD AUTO: 0.05 X10*3/UL (ref 0–0.5)
IMM GRANULOCYTES NFR BLD AUTO: 0.5 % (ref 0–0.9)
LACTATE SERPL-SCNC: 1.5 MMOL/L (ref 0.4–2)
LYMPHOCYTES # BLD AUTO: 2.3 X10*3/UL (ref 0.8–3)
LYMPHOCYTES NFR BLD AUTO: 23.5 %
MCH RBC QN AUTO: 28.8 PG (ref 26–34)
MCHC RBC AUTO-ENTMCNC: 33.3 G/DL (ref 32–36)
MCV RBC AUTO: 86 FL (ref 80–100)
MONOCYTES # BLD AUTO: 0.67 X10*3/UL (ref 0.05–0.8)
MONOCYTES NFR BLD AUTO: 6.9 %
NEUTROPHILS # BLD AUTO: 6.66 X10*3/UL (ref 1.6–5.5)
NEUTROPHILS NFR BLD AUTO: 68.1 %
NRBC BLD-RTO: 0 /100 WBCS (ref 0–0)
PLATELET # BLD AUTO: 369 X10*3/UL (ref 150–450)
POTASSIUM SERPL-SCNC: 4 MMOL/L (ref 3.5–5.3)
PROT SERPL-MCNC: 6.4 G/DL (ref 6.4–8.2)
RBC # BLD AUTO: 4.17 X10*6/UL (ref 4–5.2)
SODIUM SERPL-SCNC: 126 MMOL/L (ref 136–145)
WBC # BLD AUTO: 9.8 X10*3/UL (ref 4.4–11.3)

## 2023-12-19 PROCEDURE — 2500000004 HC RX 250 GENERAL PHARMACY W/ HCPCS (ALT 636 FOR OP/ED): Performed by: ANESTHESIOLOGIST ASSISTANT

## 2023-12-19 PROCEDURE — 99285 EMERGENCY DEPT VISIT HI MDM: CPT | Performed by: EMERGENCY MEDICINE

## 2023-12-19 PROCEDURE — 85025 COMPLETE CBC W/AUTO DIFF WBC: CPT | Performed by: EMERGENCY MEDICINE

## 2023-12-19 PROCEDURE — 0WUF0JZ SUPPLEMENT ABDOMINAL WALL WITH SYNTHETIC SUBSTITUTE, OPEN APPROACH: ICD-10-PCS | Performed by: SURGERY

## 2023-12-19 PROCEDURE — 2500000004 HC RX 250 GENERAL PHARMACY W/ HCPCS (ALT 636 FOR OP/ED): Performed by: EMERGENCY MEDICINE

## 2023-12-19 PROCEDURE — 36415 COLL VENOUS BLD VENIPUNCTURE: CPT | Performed by: EMERGENCY MEDICINE

## 2023-12-19 PROCEDURE — 2500000004 HC RX 250 GENERAL PHARMACY W/ HCPCS (ALT 636 FOR OP/ED): Performed by: SURGERY

## 2023-12-19 PROCEDURE — 96374 THER/PROPH/DIAG INJ IV PUSH: CPT

## 2023-12-19 PROCEDURE — 1159F MED LIST DOCD IN RCRD: CPT | Performed by: SURGERY

## 2023-12-19 PROCEDURE — 99140 ANES COMP EMERGENCY COND: CPT | Performed by: ANESTHESIOLOGY

## 2023-12-19 PROCEDURE — 3600000003 HC OR TIME - INITIAL BASE CHARGE - PROCEDURE LEVEL THREE: Performed by: SURGERY

## 2023-12-19 PROCEDURE — 2500000004 HC RX 250 GENERAL PHARMACY W/ HCPCS (ALT 636 FOR OP/ED): Performed by: ANESTHESIOLOGY

## 2023-12-19 PROCEDURE — 2500000004 HC RX 250 GENERAL PHARMACY W/ HCPCS (ALT 636 FOR OP/ED)

## 2023-12-19 PROCEDURE — 7100000002 HC RECOVERY ROOM TIME - EACH INCREMENTAL 1 MINUTE: Performed by: SURGERY

## 2023-12-19 PROCEDURE — 1036F TOBACCO NON-USER: CPT | Performed by: SURGERY

## 2023-12-19 PROCEDURE — 84075 ASSAY ALKALINE PHOSPHATASE: CPT | Performed by: EMERGENCY MEDICINE

## 2023-12-19 PROCEDURE — 1125F AMNT PAIN NOTED PAIN PRSNT: CPT | Performed by: SURGERY

## 2023-12-19 PROCEDURE — 96361 HYDRATE IV INFUSION ADD-ON: CPT

## 2023-12-19 PROCEDURE — 83605 ASSAY OF LACTIC ACID: CPT | Performed by: EMERGENCY MEDICINE

## 2023-12-19 PROCEDURE — 96374 THER/PROPH/DIAG INJ IV PUSH: CPT | Mod: 59,MUE

## 2023-12-19 PROCEDURE — 3700000001 HC GENERAL ANESTHESIA TIME - INITIAL BASE CHARGE: Performed by: SURGERY

## 2023-12-19 PROCEDURE — 2780000003 HC OR 278 NO HCPCS: Performed by: SURGERY

## 2023-12-19 PROCEDURE — 96372 THER/PROPH/DIAG INJ SC/IM: CPT | Mod: 59

## 2023-12-19 PROCEDURE — 99100 ANES PT EXTEME AGE<1 YR&>70: CPT | Performed by: ANESTHESIOLOGY

## 2023-12-19 PROCEDURE — 2500000004 HC RX 250 GENERAL PHARMACY W/ HCPCS (ALT 636 FOR OP/ED): Performed by: NURSE PRACTITIONER

## 2023-12-19 PROCEDURE — 1100000001 HC PRIVATE ROOM DAILY

## 2023-12-19 PROCEDURE — 96375 TX/PRO/DX INJ NEW DRUG ADDON: CPT

## 2023-12-19 PROCEDURE — 7100000001 HC RECOVERY ROOM TIME - INITIAL BASE CHARGE: Performed by: SURGERY

## 2023-12-19 PROCEDURE — G0378 HOSPITAL OBSERVATION PER HR: HCPCS

## 2023-12-19 PROCEDURE — 2500000005 HC RX 250 GENERAL PHARMACY W/O HCPCS: Performed by: ANESTHESIOLOGIST ASSISTANT

## 2023-12-19 PROCEDURE — 3600000008 HC OR TIME - EACH INCREMENTAL 1 MINUTE - PROCEDURE LEVEL THREE: Performed by: SURGERY

## 2023-12-19 PROCEDURE — A49594 PR RPR AA HERNIA 1ST 3-10 CM NCRC8/STRANGULATED: Performed by: ANESTHESIOLOGIST ASSISTANT

## 2023-12-19 PROCEDURE — C1781 MESH (IMPLANTABLE): HCPCS | Performed by: SURGERY

## 2023-12-19 PROCEDURE — 49594 RPR AA HRN 1ST 3-10 NCR/STRN: CPT | Performed by: SURGERY

## 2023-12-19 PROCEDURE — 99204 OFFICE O/P NEW MOD 45 MIN: CPT | Performed by: SURGERY

## 2023-12-19 PROCEDURE — 2720000007 HC OR 272 NO HCPCS: Performed by: SURGERY

## 2023-12-19 PROCEDURE — 99222 1ST HOSP IP/OBS MODERATE 55: CPT | Performed by: SURGERY

## 2023-12-19 PROCEDURE — 0751T DGTZ GLS MCRSCP SLD LEVEL II: CPT | Mod: TC,SUR,STJLAB | Performed by: SURGERY

## 2023-12-19 PROCEDURE — A49594 PR RPR AA HERNIA 1ST 3-10 CM NCRC8/STRANGULATED: Performed by: ANESTHESIOLOGY

## 2023-12-19 PROCEDURE — 3700000002 HC GENERAL ANESTHESIA TIME - EACH INCREMENTAL 1 MINUTE: Performed by: SURGERY

## 2023-12-19 PROCEDURE — 99285 EMERGENCY DEPT VISIT HI MDM: CPT | Mod: 25 | Performed by: EMERGENCY MEDICINE

## 2023-12-19 PROCEDURE — 88302 TISSUE EXAM BY PATHOLOGIST: CPT | Performed by: PATHOLOGY

## 2023-12-19 DEVICE — VENTRIO ST HERNIA PATCH, 13.8 CM X 17.8 CM (5.4" X 7.0"), OVAL
Type: IMPLANTABLE DEVICE | Site: ABDOMEN | Status: FUNCTIONAL
Brand: VENTRIO

## 2023-12-19 RX ORDER — ALBUTEROL SULFATE 0.83 MG/ML
2.5 SOLUTION RESPIRATORY (INHALATION) ONCE AS NEEDED
Status: DISCONTINUED | OUTPATIENT
Start: 2023-12-19 | End: 2023-12-20 | Stop reason: HOSPADM

## 2023-12-19 RX ORDER — POLYETHYLENE GLYCOL 3350 17 G/17G
17 POWDER, FOR SOLUTION ORAL DAILY PRN
COMMUNITY
End: 2024-02-25 | Stop reason: HOSPADM

## 2023-12-19 RX ORDER — HYDROMORPHONE HYDROCHLORIDE 0.2 MG/ML
0.2 INJECTION INTRAMUSCULAR; INTRAVENOUS; SUBCUTANEOUS EVERY 5 MIN PRN
Status: DISCONTINUED | OUTPATIENT
Start: 2023-12-19 | End: 2023-12-19

## 2023-12-19 RX ORDER — VITAMIN E MIXED 400 UNIT
1000 CAPSULE ORAL DAILY
COMMUNITY
End: 2023-12-21 | Stop reason: HOSPADM

## 2023-12-19 RX ORDER — HEPARIN SODIUM 5000 [USP'U]/ML
5000 INJECTION, SOLUTION INTRAVENOUS; SUBCUTANEOUS EVERY 8 HOURS
Status: DISCONTINUED | OUTPATIENT
Start: 2023-12-19 | End: 2023-12-21 | Stop reason: HOSPADM

## 2023-12-19 RX ORDER — PSYLLIUM HUSK 0.4 G
1200 CAPSULE ORAL DAILY
COMMUNITY

## 2023-12-19 RX ORDER — LABETALOL HYDROCHLORIDE 5 MG/ML
5 INJECTION, SOLUTION INTRAVENOUS ONCE AS NEEDED
Status: DISCONTINUED | OUTPATIENT
Start: 2023-12-19 | End: 2023-12-19

## 2023-12-19 RX ORDER — HYDROMORPHONE HYDROCHLORIDE 0.2 MG/ML
0.2 INJECTION INTRAMUSCULAR; INTRAVENOUS; SUBCUTANEOUS EVERY 5 MIN PRN
Status: DISCONTINUED | OUTPATIENT
Start: 2023-12-19 | End: 2023-12-20 | Stop reason: HOSPADM

## 2023-12-19 RX ORDER — FENTANYL CITRATE 50 UG/ML
INJECTION, SOLUTION INTRAMUSCULAR; INTRAVENOUS AS NEEDED
Status: DISCONTINUED | OUTPATIENT
Start: 2023-12-19 | End: 2023-12-19

## 2023-12-19 RX ORDER — DOCUSATE SODIUM 100 MG/1
200 CAPSULE, LIQUID FILLED ORAL NIGHTLY
COMMUNITY
End: 2024-02-25 | Stop reason: HOSPADM

## 2023-12-19 RX ORDER — HYDRALAZINE HYDROCHLORIDE 20 MG/ML
5 INJECTION INTRAMUSCULAR; INTRAVENOUS EVERY 30 MIN PRN
Status: DISCONTINUED | OUTPATIENT
Start: 2023-12-19 | End: 2023-12-19

## 2023-12-19 RX ORDER — KETOROLAC TROMETHAMINE 15 MG/ML
15 INJECTION, SOLUTION INTRAMUSCULAR; INTRAVENOUS EVERY 6 HOURS
Status: DISCONTINUED | OUTPATIENT
Start: 2023-12-19 | End: 2023-12-21 | Stop reason: HOSPADM

## 2023-12-19 RX ORDER — SODIUM CHLORIDE, SODIUM LACTATE, POTASSIUM CHLORIDE, CALCIUM CHLORIDE 600; 310; 30; 20 MG/100ML; MG/100ML; MG/100ML; MG/100ML
75 INJECTION, SOLUTION INTRAVENOUS CONTINUOUS
Status: DISCONTINUED | OUTPATIENT
Start: 2023-12-19 | End: 2023-12-21 | Stop reason: HOSPADM

## 2023-12-19 RX ORDER — CIPROFLOXACIN 2 MG/ML
INJECTION, SOLUTION INTRAVENOUS CONTINUOUS PRN
Status: DISCONTINUED | OUTPATIENT
Start: 2023-12-19 | End: 2023-12-19

## 2023-12-19 RX ORDER — HYDROMORPHONE HYDROCHLORIDE 1 MG/ML
INJECTION, SOLUTION INTRAMUSCULAR; INTRAVENOUS; SUBCUTANEOUS AS NEEDED
Status: DISCONTINUED | OUTPATIENT
Start: 2023-12-19 | End: 2023-12-19

## 2023-12-19 RX ORDER — OXYCODONE HYDROCHLORIDE 5 MG/1
5 TABLET ORAL EVERY 4 HOURS PRN
Status: DISCONTINUED | OUTPATIENT
Start: 2023-12-19 | End: 2023-12-20 | Stop reason: HOSPADM

## 2023-12-19 RX ORDER — ONDANSETRON HYDROCHLORIDE 2 MG/ML
4 INJECTION, SOLUTION INTRAVENOUS ONCE AS NEEDED
Status: DISCONTINUED | OUTPATIENT
Start: 2023-12-19 | End: 2023-12-19

## 2023-12-19 RX ORDER — LIDOCAINE HYDROCHLORIDE 10 MG/ML
0.1 INJECTION, SOLUTION INFILTRATION; PERINEURAL ONCE
Status: DISCONTINUED | OUTPATIENT
Start: 2023-12-19 | End: 2023-12-19

## 2023-12-19 RX ORDER — ALBUTEROL SULFATE 0.83 MG/ML
2.5 SOLUTION RESPIRATORY (INHALATION) ONCE AS NEEDED
Status: DISCONTINUED | OUTPATIENT
Start: 2023-12-19 | End: 2023-12-19

## 2023-12-19 RX ORDER — DIPHENHYDRAMINE HYDROCHLORIDE 50 MG/ML
12.5 INJECTION INTRAMUSCULAR; INTRAVENOUS ONCE AS NEEDED
Status: DISCONTINUED | OUTPATIENT
Start: 2023-12-19 | End: 2023-12-19

## 2023-12-19 RX ORDER — PROPOFOL 10 MG/ML
INJECTION, EMULSION INTRAVENOUS AS NEEDED
Status: DISCONTINUED | OUTPATIENT
Start: 2023-12-19 | End: 2023-12-19

## 2023-12-19 RX ORDER — ONDANSETRON HYDROCHLORIDE 2 MG/ML
INJECTION, SOLUTION INTRAVENOUS AS NEEDED
Status: DISCONTINUED | OUTPATIENT
Start: 2023-12-19 | End: 2023-12-19

## 2023-12-19 RX ORDER — ACETAMINOPHEN 325 MG/1
975 TABLET ORAL ONCE
Status: DISCONTINUED | OUTPATIENT
Start: 2023-12-19 | End: 2023-12-20 | Stop reason: HOSPADM

## 2023-12-19 RX ORDER — SODIUM CHLORIDE, SODIUM LACTATE, POTASSIUM CHLORIDE, CALCIUM CHLORIDE 600; 310; 30; 20 MG/100ML; MG/100ML; MG/100ML; MG/100ML
100 INJECTION, SOLUTION INTRAVENOUS CONTINUOUS
Status: DISCONTINUED | OUTPATIENT
Start: 2023-12-19 | End: 2023-12-19

## 2023-12-19 RX ORDER — LIDOCAINE HYDROCHLORIDE 20 MG/ML
INJECTION, SOLUTION EPIDURAL; INFILTRATION; INTRACAUDAL; PERINEURAL AS NEEDED
Status: DISCONTINUED | OUTPATIENT
Start: 2023-12-19 | End: 2023-12-19

## 2023-12-19 RX ORDER — OXYCODONE AND ACETAMINOPHEN 5; 325 MG/1; MG/1
1 TABLET ORAL EVERY 4 HOURS PRN
Status: DISCONTINUED | OUTPATIENT
Start: 2023-12-19 | End: 2023-12-20 | Stop reason: HOSPADM

## 2023-12-19 RX ORDER — BUSPIRONE HYDROCHLORIDE 10 MG/1
10 TABLET ORAL 2 TIMES DAILY
COMMUNITY

## 2023-12-19 RX ORDER — BACLOFEN 20 MG
500 TABLET ORAL DAILY
COMMUNITY
End: 2024-02-25 | Stop reason: HOSPADM

## 2023-12-19 RX ORDER — MORPHINE SULFATE 4 MG/ML
4 INJECTION, SOLUTION INTRAMUSCULAR; INTRAVENOUS ONCE
Status: COMPLETED | OUTPATIENT
Start: 2023-12-19 | End: 2023-12-19

## 2023-12-19 RX ORDER — ONDANSETRON HYDROCHLORIDE 2 MG/ML
4 INJECTION, SOLUTION INTRAVENOUS ONCE
Status: COMPLETED | OUTPATIENT
Start: 2023-12-19 | End: 2023-12-19

## 2023-12-19 RX ORDER — MULTIVIT-MIN/IRON FUM/FOLIC AC 7.5 MG-4
1 TABLET ORAL DAILY
COMMUNITY

## 2023-12-19 RX ORDER — MORPHINE SULFATE 4 MG/ML
4 INJECTION, SOLUTION INTRAMUSCULAR; INTRAVENOUS EVERY 4 HOURS PRN
Status: DISCONTINUED | OUTPATIENT
Start: 2023-12-19 | End: 2023-12-20

## 2023-12-19 RX ORDER — ONDANSETRON HYDROCHLORIDE 2 MG/ML
4 INJECTION, SOLUTION INTRAVENOUS EVERY 6 HOURS PRN
Status: DISCONTINUED | OUTPATIENT
Start: 2023-12-19 | End: 2023-12-21 | Stop reason: HOSPADM

## 2023-12-19 RX ORDER — DEXAMETHASONE SODIUM PHOSPHATE 10 MG/ML
INJECTION INTRAMUSCULAR; INTRAVENOUS AS NEEDED
Status: DISCONTINUED | OUTPATIENT
Start: 2023-12-19 | End: 2023-12-19

## 2023-12-19 RX ORDER — CIPROFLOXACIN 2 MG/ML
400 INJECTION, SOLUTION INTRAVENOUS ONCE
Status: CANCELLED | OUTPATIENT
Start: 2023-12-19 | End: 2023-12-19

## 2023-12-19 RX ORDER — SUCCINYLCHOLINE CHLORIDE 100 MG/5ML
SYRINGE (ML) INTRAVENOUS AS NEEDED
Status: DISCONTINUED | OUTPATIENT
Start: 2023-12-19 | End: 2023-12-19

## 2023-12-19 RX ORDER — MORPHINE SULFATE 2 MG/ML
1 INJECTION, SOLUTION INTRAMUSCULAR; INTRAVENOUS EVERY 4 HOURS PRN
Status: DISCONTINUED | OUTPATIENT
Start: 2023-12-19 | End: 2023-12-20

## 2023-12-19 RX ORDER — DIPHENHYDRAMINE HYDROCHLORIDE 50 MG/ML
12.5 INJECTION INTRAMUSCULAR; INTRAVENOUS ONCE AS NEEDED
Status: DISCONTINUED | OUTPATIENT
Start: 2023-12-19 | End: 2023-12-20 | Stop reason: HOSPADM

## 2023-12-19 RX ORDER — HYDRALAZINE HYDROCHLORIDE 20 MG/ML
5 INJECTION INTRAMUSCULAR; INTRAVENOUS EVERY 30 MIN PRN
Status: DISCONTINUED | OUTPATIENT
Start: 2023-12-19 | End: 2023-12-20 | Stop reason: HOSPADM

## 2023-12-19 RX ORDER — ROCURONIUM BROMIDE 50 MG/5 ML
SYRINGE (ML) INTRAVENOUS AS NEEDED
Status: DISCONTINUED | OUTPATIENT
Start: 2023-12-19 | End: 2023-12-19

## 2023-12-19 RX ORDER — MORPHINE SULFATE 2 MG/ML
2 INJECTION, SOLUTION INTRAMUSCULAR; INTRAVENOUS EVERY 4 HOURS PRN
Status: DISCONTINUED | OUTPATIENT
Start: 2023-12-19 | End: 2023-12-20

## 2023-12-19 RX ADMIN — SODIUM CHLORIDE, POTASSIUM CHLORIDE, SODIUM LACTATE AND CALCIUM CHLORIDE 1000 ML: 600; 310; 30; 20 INJECTION, SOLUTION INTRAVENOUS at 18:57

## 2023-12-19 RX ADMIN — PROPOFOL 30 MG: 10 INJECTION, EMULSION INTRAVENOUS at 21:17

## 2023-12-19 RX ADMIN — PROMETHAZINE HYDROCHLORIDE 6.25 MG: 25 INJECTION INTRAMUSCULAR; INTRAVENOUS at 22:50

## 2023-12-19 RX ADMIN — HYDROMORPHONE HYDROCHLORIDE 0.5 MG: 1 INJECTION, SOLUTION INTRAMUSCULAR; INTRAVENOUS; SUBCUTANEOUS at 22:30

## 2023-12-19 RX ADMIN — Medication 30 MG: at 21:08

## 2023-12-19 RX ADMIN — CIPROFLOXACIN: 400 INJECTION, SOLUTION INTRAVENOUS at 21:00

## 2023-12-19 RX ADMIN — HYDROMORPHONE HYDROCHLORIDE 1 MG: 1 INJECTION, SOLUTION INTRAMUSCULAR; INTRAVENOUS; SUBCUTANEOUS at 22:15

## 2023-12-19 RX ADMIN — SUGAMMADEX 200 MG: 100 INJECTION, SOLUTION INTRAVENOUS at 22:02

## 2023-12-19 RX ADMIN — PROPOFOL 50 MG: 10 INJECTION, EMULSION INTRAVENOUS at 21:08

## 2023-12-19 RX ADMIN — LIDOCAINE HYDROCHLORIDE 100 MG: 20 INJECTION, SOLUTION EPIDURAL; INFILTRATION; INTRACAUDAL; PERINEURAL at 20:58

## 2023-12-19 RX ADMIN — ONDANSETRON 4 MG: 2 INJECTION, SOLUTION INTRAMUSCULAR; INTRAVENOUS at 21:56

## 2023-12-19 RX ADMIN — FENTANYL CITRATE 50 MCG: 50 INJECTION, SOLUTION INTRAMUSCULAR; INTRAVENOUS at 20:58

## 2023-12-19 RX ADMIN — DEXAMETHASONE SODIUM PHOSPHATE 10 MG: 10 INJECTION INTRAMUSCULAR; INTRAVENOUS at 20:58

## 2023-12-19 RX ADMIN — FENTANYL CITRATE 50 MCG: 50 INJECTION, SOLUTION INTRAMUSCULAR; INTRAVENOUS at 21:17

## 2023-12-19 RX ADMIN — Medication 20 MG: at 21:24

## 2023-12-19 RX ADMIN — HYDROMORPHONE HYDROCHLORIDE 0.5 MG: 1 INJECTION, SOLUTION INTRAMUSCULAR; INTRAVENOUS; SUBCUTANEOUS at 23:24

## 2023-12-19 RX ADMIN — HYDROMORPHONE HYDROCHLORIDE 0.5 MG: 1 INJECTION, SOLUTION INTRAMUSCULAR; INTRAVENOUS; SUBCUTANEOUS at 22:53

## 2023-12-19 RX ADMIN — SODIUM CHLORIDE, SODIUM LACTATE, POTASSIUM CHLORIDE, AND CALCIUM CHLORIDE: 600; 310; 30; 20 INJECTION, SOLUTION INTRAVENOUS at 20:55

## 2023-12-19 RX ADMIN — Medication 10 MG: at 21:42

## 2023-12-19 RX ADMIN — PROPOFOL 120 MG: 10 INJECTION, EMULSION INTRAVENOUS at 20:58

## 2023-12-19 RX ADMIN — ONDANSETRON 4 MG: 2 INJECTION INTRAMUSCULAR; INTRAVENOUS at 18:57

## 2023-12-19 RX ADMIN — HEPARIN SODIUM 5000 UNITS: 5000 INJECTION INTRAVENOUS; SUBCUTANEOUS at 20:09

## 2023-12-19 RX ADMIN — Medication 100 MG: at 20:58

## 2023-12-19 RX ADMIN — HYDROMORPHONE HYDROCHLORIDE 1 MG: 1 INJECTION, SOLUTION INTRAMUSCULAR; INTRAVENOUS; SUBCUTANEOUS at 21:42

## 2023-12-19 RX ADMIN — MORPHINE SULFATE 4 MG: 4 INJECTION, SOLUTION INTRAMUSCULAR; INTRAVENOUS at 18:56

## 2023-12-19 SDOH — HEALTH STABILITY: MENTAL HEALTH: CURRENT SMOKER: 0

## 2023-12-19 ASSESSMENT — PAIN SCALES - GENERAL
PAINLEVEL_OUTOF10: 10 - WORST POSSIBLE PAIN
PAINLEVEL_OUTOF10: 9
PAINLEVEL_OUTOF10: 3
PAINLEVEL_OUTOF10: 7
PAINLEVEL_OUTOF10: 10 - WORST POSSIBLE PAIN
PAINLEVEL_OUTOF10: 8
PAINLEVEL_OUTOF10: 10 - WORST POSSIBLE PAIN
PAINLEVEL_OUTOF10: 10 - WORST POSSIBLE PAIN

## 2023-12-19 ASSESSMENT — PAIN DESCRIPTION - LOCATION: LOCATION: ABDOMEN

## 2023-12-19 ASSESSMENT — COLUMBIA-SUICIDE SEVERITY RATING SCALE - C-SSRS
2. HAVE YOU ACTUALLY HAD ANY THOUGHTS OF KILLING YOURSELF?: NO
6. HAVE YOU EVER DONE ANYTHING, STARTED TO DO ANYTHING, OR PREPARED TO DO ANYTHING TO END YOUR LIFE?: NO
1. IN THE PAST MONTH, HAVE YOU WISHED YOU WERE DEAD OR WISHED YOU COULD GO TO SLEEP AND NOT WAKE UP?: NO

## 2023-12-19 ASSESSMENT — PAIN - FUNCTIONAL ASSESSMENT
PAIN_FUNCTIONAL_ASSESSMENT: 0-10

## 2023-12-19 NOTE — ED PROVIDER NOTES
HPI   Chief Complaint   Patient presents with    Abdominal Pain       EZRA Augustin is a 73-year-old female with a history of ventral hernia who presents to the ED with complaint of abdominal pain. Her symptom started this morning.  She was seen on Friday for similar symptoms and was sent home following consultation with Dr. Giraldo to schedule a surgical procedure.  She was advised to return to the ED if symptom returns or the pain worsens.  She rates the pain an 8.5 of 10.  She states that the pain is sharp, constant and localized to the periumbilical region.  The patient denies headache, dizziness, lightheadedness, fever, or chest pain.                  [place] Coma Scale Score: 15                  Patient History   Past Medical History:   Diagnosis Date    Drusen (degenerative) of macula, unspecified eye 02/19/2015    Macular drusen     Past Surgical History:   Procedure Laterality Date    OTHER SURGICAL HISTORY  03/06/2020    Rectocele and cystocele repair     No family history on file.  Social History     Tobacco Use    Smoking status: Never    Smokeless tobacco: Never   Vaping Use    Vaping Use: Never used   Substance Use Topics    Alcohol use: Defer    Drug use: Defer       Physical Exam   ED Triage Vitals [12/19/23 1818]   Temp Heart Rate Resp BP   36.6 °C (97.9 °F) 94 18 (!) 198/124      SpO2 Temp src Heart Rate Source Patient Position   97 % -- -- --      BP Location FiO2 (%)     -- --       Physical Exam  Constitutional:       General: She is in acute distress.      Appearance: She is well-developed and normal weight. She is ill-appearing. She is not toxic-appearing.   HENT:      Head: Normocephalic and atraumatic.   Eyes:      Extraocular Movements: Extraocular movements intact.      Pupils: Pupils are equal, round, and reactive to light.   Cardiovascular:      Rate and Rhythm: Normal rate and regular rhythm.      Heart sounds: Normal heart sounds. No murmur heard.     No friction rub. No gallop.    Pulmonary:      Effort: No respiratory distress.      Breath sounds: No stridor. No wheezing, rhonchi or rales.   Abdominal:      General: Abdomen is flat and protuberant. There is no distension. There are no signs of injury.      Palpations: Abdomen is soft. There is mass (Ventral hernia). There is no pulsatile mass.      Tenderness: There is abdominal tenderness in the periumbilical area. There is no guarding or rebound.      Hernia: A hernia is present. Hernia is present in the ventral area.   Skin:     General: Skin is warm.      Capillary Refill: Capillary refill takes less than 2 seconds.      Coloration: Skin is not jaundiced or pale.      Findings: No erythema.   Neurological:      General: No focal deficit present.      Mental Status: She is alert and oriented to person, place, and time.   Psychiatric:         Mood and Affect: Mood normal.         Behavior: Behavior normal.         ED Course & MDM   Diagnoses as of 12/19/23 1956   Strangulated ventral hernia       Medical Decision Making  Patient is a 73-year-old female with a history of ventral hernia who presents to the ED with complaint of abdominal pain.  The patient is awake, alert, and oriented.  She is ill-appearing, and having discomfort talking due to the abdominal pain.  Lab was ordered, CBC results shows no evidence of anemia, and chemistry panel is not significant for electrolyte abnormality.    The case was discussed with the attending, Dr. Vincent, who also and that evaluated the patient at the bedside.  He attempted to reduce the ventral hernia, but the mass was irreducible, indicating concern for bowel incarceration.    Surgery consult were requested, and Dr. Giraldo accepted the patient for emergent surgical procedure.  Patient will be admitted to the OR for further observation and management.  The decision to admit was discussed with the patient who expressed understanding and agreed to the management plan.  The patient was transferred in a  stable condition.        Procedure  Procedures     Ayo Lo MD  Resident  12/20/23 1601       Ayo Lo MD  Resident  12/20/23     The patient was seen by the resident/fellow.  I have personally performed a substantive portion of the encounter.  I have seen and examined the patient; agree with the workup, evaluation, MDM, management and diagnosis.  The care plan has been discussed with the resident; I have reviewed the resident’s note and agree with the documented findings.         Robinson Gary, DO  12/29/23 1739

## 2023-12-19 NOTE — PROGRESS NOTES
Demetria Enriquez   [id number]   1950         Chief Complaint/    Hernia              HPI/    73-year-old female seen for hernia    Patient has had 2 episodes of severe epigastric pain.  She notes a lump in the epigastric midline she has been to the emergency room for this.  Incarcerated hernia has been identified.  Hernia was reduced    Patient presents now for surgical evaluation    Patient says the lump pops in and out from time to time.  She usually can reduce it without any problems.  Patient denies any surgery in this part of the body        Past Medical History:   Diagnosis Date    Drusen (degenerative) of macula, unspecified eye 02/19/2015    Macular drusen        Social History     Socioeconomic History    Marital status:      Spouse name: Not on file    Number of children: Not on file    Years of education: Not on file    Highest education level: Not on file   Occupational History    Not on file   Tobacco Use    Smoking status: Never    Smokeless tobacco: Never   Vaping Use    Vaping Use: Never used   Substance and Sexual Activity    Alcohol use: Defer    Drug use: Defer    Sexual activity: Defer   Other Topics Concern    Not on file   Social History Narrative    Not on file     Social Determinants of Health     Financial Resource Strain: Not on file   Food Insecurity: Not on file   Transportation Needs: Not on file   Physical Activity: Not on file   Stress: Not on file   Social Connections: Not on file   Intimate Partner Violence: Not on file   Housing Stability: Not on file   Denies hypertension, diabetes, coronary artery disease    Back pain    History of fall in September.  Multiple aches and pains since that time    Chronic constipation      No family history on file.     Review of Systems   All other systems reviewed and are negative.         Current Outpatient Medications:     acetaminophen (TylenoL) 325 mg capsule, , Disp: , Rfl:     ALPRAZolam (Xanax) 0.5 mg tablet, Take 1 tablet (0.5 mg)  by mouth 3 times a day as needed., Disp: , Rfl:     artificial tears, dextran-hypomel-glycerin, 0.1-0.3-0.2 % ophthalmic solution, Administer 1 drop into affected eye(s) 4 times a day as needed., Disp: , Rfl:     ascorbic acid (VITAMIN C ORAL), Take by mouth., Disp: , Rfl:     biotin 1 mg capsule, , Disp: , Rfl:     calcium carb/vitamin D2/soyb (CALTRATE 600 WITH SOY ORAL), Take 1,200 mg by mouth once daily., Disp: , Rfl:     cholecalciferol (Vitamin D-3) 25 MCG (1000 UT) capsule, , Disp: , Rfl:     citalopram (CeleXA) 10 mg tablet, Take 4 tablets (40 mg) by mouth once daily., Disp: , Rfl:     clotrimazole-betamethasone (Lotrisone) cream, Apply topically 2 times a day., Disp: , Rfl:     diclofenac sodium (Voltaren) 1 % gel gel, Apply 0.5 Applications topically every 6 hours if needed (to control inflammation and pain)., Disp: , Rfl:     estradiol (Estrace) 0.01 % (0.1 mg/gram) vaginal cream, USE AS DIRECTED, Disp: , Rfl:     faricimab-svoa (Vabysmo) 6 mg/0.05 mL solution injection, by intravitreal route., Disp: , Rfl:     HYDROcodone-acetaminophen (Norco) 5-325 mg tablet, , Disp: , Rfl:     hydrOXYzine HCL (Atarax) 25 mg tablet, , Disp: , Rfl:     levothyroxine (Tirosint) 50 mcg capsule, Take 1 capsule (50 mcg) by mouth once daily., Disp: , Rfl:     MAGNESIUM ORAL, , Disp: , Rfl:     melatonin 10 mg capsule, Take 1 capsule (10 mg) by mouth once daily at bedtime., Disp: , Rfl:     naproxen (Naprosyn) 500 mg tablet, , Disp: , Rfl:     omega-3 (Fish OiL) 60- mg capsule, Take by mouth., Disp: , Rfl:     omeprazole (PriLOSEC) 40 mg DR capsule, PLEASE SEE ATTACHED FOR DETAILED DIRECTIONS, Disp: , Rfl:     peg 400-propylene glycol (Systane, propylene glycoL,) 0.4-0.3 % drops ophthalmic drops, , Disp: , Rfl:     polyethylene glycol (Miralax) 17 gram packet, Take by mouth., Disp: , Rfl:     psyllium (MetamuciL) 0.4 gram capsule, Take by mouth., Disp: , Rfl:     simvastatin (Zocor) 20 mg tablet, , Disp: , Rfl:      SUMAtriptan (Imitrex) 100 mg tablet, Take 1 tablet (100 mg) by mouth once daily as needed for migraine. MAY REPEAT ONCE IN 2 HOURS FOR 30 DAYS, Disp: , Rfl:     tamsulosin (Flomax) 0.4 mg 24 hr capsule, Take 2 capsules (0.8 mg) by mouth once daily at bedtime., Disp: 60 capsule, Rfl: 3    tiZANidine (Zanaflex) 4 mg tablet, Take 1 tablet (4 mg) by mouth every 6 hours if needed for muscle spasms., Disp: , Rfl:     vit C,B-Ss-jdslk-lutein-zeaxan (PreserVision AREDS-2) 250-90-40-1 mg capsule, Take 1 capsule by mouth twice a day., Disp: , Rfl:     VITAMIN E ACETATE ORAL, Take by mouth., Disp: , Rfl:     Bacillus subtilis-inulin 1.5 billion cell-1 gram tablet,chewable, , Disp: , Rfl:     calcium carbonate-vitamin D3 (Calcium 500 + D) 500 mg-10 mcg (400 unit) chewable tablet, Chew 1 tablet once daily., Disp: , Rfl:     cetirizine (ZYRTEC) 10 mg capsule, Take 1 capsule (10 mg) by mouth if needed., Disp: , Rfl:     diphenhydrAMINE (Benadryl Allergy) 25 mg tablet, Take by mouth., Disp: , Rfl:     doxepin (SINEquan) 10 mg capsule, , Disp: , Rfl:     escitalopram (Lexapro) 10 mg tablet, Take 1 tablet (10 mg) by mouth once daily., Disp: , Rfl:     gabapentin (Neurontin) 400 mg capsule, Take 1 capsule (400 mg) by mouth 2 times a day., Disp: , Rfl:     levothyroxine (Synthroid) 25 mcg tablet, Take by mouth., Disp: , Rfl:     linaCLOtide (Linzess) 72 mcg capsule, Take 1 capsule (72 mcg) by mouth once daily., Disp: , Rfl:     pseudoephedrine HCl (SUDAFED ORAL), Take by mouth once daily., Disp: , Rfl:     SUMAtriptan (Imitrex) 50 mg tablet, Take 1 tablet (50 mg) by mouth 1 time if needed for migraine., Disp: , Rfl:      Allergies   Allergen Reactions    Tramadol Unknown and Headache     Tolerates morphine 10/2023, tolerates Norco PDMP    Valacyclovir Hives and Rash    Adhesive Tape-Silicones Itching    Azo [Phenazopyridine] Nausea/vomiting    Cephalexin Unknown    Trazodone Other     Gave Migraine    Macrobid [Nitrofurantoin  Monohyd/M-Cryst] Rash        ECG 12 Lead  Normal sinus rhythm  Early transition  Normal ECG  When compared with ECG of 11-OCT-2023 23:00,  No significant change was found  Confirmed by Binu Donovan ([id number]) on 12/18/2023 10:03:47 PM  CT abdomen pelvis w IV contrast  Narrative: Interpreted By:  Johan Reyes,   STUDY:  CT ABDOMEN PELVIS W IV CONTRAST;  12/18/2023 12:04 am      INDICATION:  Signs/Symptoms:ABD PAIN.      COMPARISON:  10/12/2023      ACCESSION NUMBER(S):  [id number]      ORDERING CLINICIAN:  DEIDRE MICHELE      TECHNIQUE:  Contiguous axial images of the abdomen and pelvis were obtained after  the intravenous administration of  contrast. Coronal and sagittal  reformatted images were obtained from the axial images.      FINDINGS:  There is limited evaluation the lung bases.  Mild basilar subsegmental atelectasis.  No pleural effusion.      2.2 cm cyst in the left hepatic lobe. The gallbladder is present. No  significant dilatation of the common bile duct.      The pancreas, spleen, and adrenal glands appear unremarkable.      Symmetric enhancement of the kidneys. No hydronephrosis.      There is stable defects in the midline abdominal wall with 4.9 cm x  2.2 cm and 6 cm x 2.1 cm fat containing anterior abdominal hernias  with mild edema and fluid in the inferior hernia.      There is moderate to large amount of stool throughout the colon.  There is also evidence of fecalization of small bowel loops. There is  however no evidence of significant small bowel dilatation with  small-bowel loops measuring 2.5 cm in diameter.      Urinary bladder appears unremarkable.      There is redemonstration of subacute comminuted fracture of right  superior pubic ramus and subacute fracture right inferior pubic ramus  with increased displacement in comparison to the prior examination.      There are bilateral subacute comminuted sacral ala fractures with  sclerosis of the sacrum. Subacute displaced fracture of the  "sacrum at  S3 with acute angulation of the sacrum.      Impression: Moderate to large colonic stool burden; please correlate for  constipation. There is also evidence of fecalization of small bowel  loops compatible with bowel stasis and mild small bowel dilatation  with may be secondary to ileus. No definite evidence of transition  point.      Bilateral comminuted subacute sacral ala fractures with sclerosis of  the sacrum. Subacute displaced fracture at S3 with acute angulation  of the sacrum. Subacute fractures of right superior and inferior  pubic ramus with increased displacement of right inferior pubic ramus  fracture.      4.9 cm x 2.2 cm and 6 cm x 2.1 cm fat containing anterior abdominal  hernias with mild edema and fluid in the inferior hernia.      MACRO:  None      Signed by: Johan Reyes 12/18/2023 12:50 AM  Dictation workstation:   [id number]     I reviewed the images and reports.  [unfilled]     /83 (BP Location: Right arm, Patient Position: Sitting, BP Cuff Size: Adult)   Pulse 101   Temp 36.2 °C (97.2 °F) (Temporal)   Resp 16   Ht 1.676 m (5' 6\")   Wt 63.1 kg (139 lb 2 oz)   SpO2 98%   BMI 22.46 kg/m²        Physical Exam  Constitutional:       Appearance: Normal appearance.   HENT:      Head: Normocephalic and atraumatic.   Cardiovascular:      Rate and Rhythm: Normal rate.   Pulmonary:      Effort: Pulmonary effort is normal.   Abdominal:      Comments: Hernial mass and upper epigastric midline, reduces    No scars over it   Musculoskeletal:         General: Normal range of motion.      Cervical back: Normal range of motion.   Skin:     General: Skin is warm.   Neurological:      General: No focal deficit present.      Mental Status: She is alert and oriented to person, place, and time.                  Assessment/    Epigastric midline hernia    Plan/    Open repair  General anesthesia  Mesh Ventralex    Risk benefits indications for surgery reviewed with the patient.  The " potential bleeding infection MI PE death etc. reviewed.  The anticipated convalescence is reviewed.  Use of mesh or prosthetic materials is reviewed.  All questions were answered and consent is obtained

## 2023-12-20 LAB
ANION GAP SERPL CALC-SCNC: 12 MMOL/L (ref 10–20)
BACTERIA UR CULT: ABNORMAL
BUN SERPL-MCNC: 11 MG/DL (ref 6–23)
CALCIUM SERPL-MCNC: 9.1 MG/DL (ref 8.6–10.3)
CHLORIDE SERPL-SCNC: 97 MMOL/L (ref 98–107)
CO2 SERPL-SCNC: 23 MMOL/L (ref 21–32)
CREAT SERPL-MCNC: 0.49 MG/DL (ref 0.5–1.05)
ERYTHROCYTE [DISTWIDTH] IN BLOOD BY AUTOMATED COUNT: 16.7 % (ref 11.5–14.5)
GFR SERPL CREATININE-BSD FRML MDRD: >90 ML/MIN/1.73M*2
GLUCOSE SERPL-MCNC: 139 MG/DL (ref 74–99)
HCT VFR BLD AUTO: 33.6 % (ref 36–46)
HGB BLD-MCNC: 10.8 G/DL (ref 12–16)
MAGNESIUM SERPL-MCNC: 2.02 MG/DL (ref 1.6–2.4)
MCH RBC QN AUTO: 28.3 PG (ref 26–34)
MCHC RBC AUTO-ENTMCNC: 32.1 G/DL (ref 32–36)
MCV RBC AUTO: 88 FL (ref 80–100)
NRBC BLD-RTO: 0 /100 WBCS (ref 0–0)
PHOSPHATE SERPL-MCNC: 3.4 MG/DL (ref 2.5–4.9)
PLATELET # BLD AUTO: 314 X10*3/UL (ref 150–450)
POTASSIUM SERPL-SCNC: 4.3 MMOL/L (ref 3.5–5.3)
RBC # BLD AUTO: 3.81 X10*6/UL (ref 4–5.2)
SODIUM SERPL-SCNC: 128 MMOL/L (ref 136–145)
WBC # BLD AUTO: 12.4 X10*3/UL (ref 4.4–11.3)

## 2023-12-20 PROCEDURE — 96376 TX/PRO/DX INJ SAME DRUG ADON: CPT

## 2023-12-20 PROCEDURE — 96374 THER/PROPH/DIAG INJ IV PUSH: CPT | Mod: 59

## 2023-12-20 PROCEDURE — 36415 COLL VENOUS BLD VENIPUNCTURE: CPT | Performed by: NURSE PRACTITIONER

## 2023-12-20 PROCEDURE — 83735 ASSAY OF MAGNESIUM: CPT | Performed by: NURSE PRACTITIONER

## 2023-12-20 PROCEDURE — 85027 COMPLETE CBC AUTOMATED: CPT | Performed by: NURSE PRACTITIONER

## 2023-12-20 PROCEDURE — 96375 TX/PRO/DX INJ NEW DRUG ADDON: CPT

## 2023-12-20 PROCEDURE — 2500000001 HC RX 250 WO HCPCS SELF ADMINISTERED DRUGS (ALT 637 FOR MEDICARE OP): Performed by: SURGERY

## 2023-12-20 PROCEDURE — 2500000004 HC RX 250 GENERAL PHARMACY W/ HCPCS (ALT 636 FOR OP/ED): Performed by: SURGERY

## 2023-12-20 PROCEDURE — 1100000001 HC PRIVATE ROOM DAILY

## 2023-12-20 PROCEDURE — 99024 POSTOP FOLLOW-UP VISIT: CPT | Performed by: SURGERY

## 2023-12-20 PROCEDURE — G0378 HOSPITAL OBSERVATION PER HR: HCPCS

## 2023-12-20 PROCEDURE — 96372 THER/PROPH/DIAG INJ SC/IM: CPT | Performed by: SURGERY

## 2023-12-20 PROCEDURE — 80048 BASIC METABOLIC PNL TOTAL CA: CPT | Performed by: NURSE PRACTITIONER

## 2023-12-20 PROCEDURE — 2500000004 HC RX 250 GENERAL PHARMACY W/ HCPCS (ALT 636 FOR OP/ED): Performed by: NURSE PRACTITIONER

## 2023-12-20 PROCEDURE — 84100 ASSAY OF PHOSPHORUS: CPT | Performed by: NURSE PRACTITIONER

## 2023-12-20 RX ORDER — MORPHINE SULFATE 4 MG/ML
4 INJECTION, SOLUTION INTRAMUSCULAR; INTRAVENOUS EVERY 2 HOUR PRN
Status: DISCONTINUED | OUTPATIENT
Start: 2023-12-20 | End: 2023-12-21 | Stop reason: HOSPADM

## 2023-12-20 RX ORDER — OXYCODONE AND ACETAMINOPHEN 5; 325 MG/1; MG/1
1 TABLET ORAL EVERY 6 HOURS PRN
Status: DISCONTINUED | OUTPATIENT
Start: 2023-12-20 | End: 2023-12-20

## 2023-12-20 RX ORDER — OXYCODONE AND ACETAMINOPHEN 5; 325 MG/1; MG/1
1 TABLET ORAL EVERY 6 HOURS PRN
Status: DISCONTINUED | OUTPATIENT
Start: 2023-12-20 | End: 2023-12-21 | Stop reason: HOSPADM

## 2023-12-20 RX ORDER — MORPHINE SULFATE 2 MG/ML
2 INJECTION, SOLUTION INTRAMUSCULAR; INTRAVENOUS EVERY 2 HOUR PRN
Status: DISCONTINUED | OUTPATIENT
Start: 2023-12-20 | End: 2023-12-21 | Stop reason: HOSPADM

## 2023-12-20 RX ADMIN — SODIUM CHLORIDE, POTASSIUM CHLORIDE, SODIUM LACTATE AND CALCIUM CHLORIDE 75 ML/HR: 600; 310; 30; 20 INJECTION, SOLUTION INTRAVENOUS at 21:00

## 2023-12-20 RX ADMIN — MORPHINE SULFATE 2 MG: 2 INJECTION, SOLUTION INTRAMUSCULAR; INTRAVENOUS at 02:21

## 2023-12-20 RX ADMIN — OXYCODONE HYDROCHLORIDE AND ACETAMINOPHEN 1 TABLET: 5; 325 TABLET ORAL at 08:57

## 2023-12-20 RX ADMIN — SODIUM CHLORIDE, POTASSIUM CHLORIDE, SODIUM LACTATE AND CALCIUM CHLORIDE 75 ML/HR: 600; 310; 30; 20 INJECTION, SOLUTION INTRAVENOUS at 01:02

## 2023-12-20 RX ADMIN — KETOROLAC TROMETHAMINE 15 MG: 15 INJECTION, SOLUTION INTRAMUSCULAR; INTRAVENOUS at 12:40

## 2023-12-20 RX ADMIN — KETOROLAC TROMETHAMINE 15 MG: 15 INJECTION, SOLUTION INTRAMUSCULAR; INTRAVENOUS at 00:58

## 2023-12-20 RX ADMIN — KETOROLAC TROMETHAMINE 15 MG: 15 INJECTION, SOLUTION INTRAMUSCULAR; INTRAVENOUS at 17:28

## 2023-12-20 RX ADMIN — KETOROLAC TROMETHAMINE 15 MG: 15 INJECTION, SOLUTION INTRAMUSCULAR; INTRAVENOUS at 23:37

## 2023-12-20 RX ADMIN — HEPARIN SODIUM 5000 UNITS: 5000 INJECTION INTRAVENOUS; SUBCUTANEOUS at 14:24

## 2023-12-20 RX ADMIN — KETOROLAC TROMETHAMINE 15 MG: 15 INJECTION, SOLUTION INTRAMUSCULAR; INTRAVENOUS at 05:32

## 2023-12-20 RX ADMIN — HEPARIN SODIUM 5000 UNITS: 5000 INJECTION INTRAVENOUS; SUBCUTANEOUS at 21:38

## 2023-12-20 RX ADMIN — HEPARIN SODIUM 5000 UNITS: 5000 INJECTION INTRAVENOUS; SUBCUTANEOUS at 05:33

## 2023-12-20 SDOH — SOCIAL STABILITY: SOCIAL INSECURITY: WERE YOU ABLE TO COMPLETE ALL THE BEHAVIORAL HEALTH SCREENINGS?: YES

## 2023-12-20 SDOH — SOCIAL STABILITY: SOCIAL INSECURITY: ABUSE: ADULT

## 2023-12-20 SDOH — SOCIAL STABILITY: SOCIAL INSECURITY: DO YOU FEEL UNSAFE GOING BACK TO THE PLACE WHERE YOU ARE LIVING?: NO

## 2023-12-20 SDOH — SOCIAL STABILITY: SOCIAL INSECURITY: ARE THERE ANY APPARENT SIGNS OF INJURIES/BEHAVIORS THAT COULD BE RELATED TO ABUSE/NEGLECT?: NO

## 2023-12-20 SDOH — SOCIAL STABILITY: SOCIAL INSECURITY: ARE YOU OR HAVE YOU BEEN THREATENED OR ABUSED PHYSICALLY, EMOTIONALLY, OR SEXUALLY BY ANYONE?: NO

## 2023-12-20 SDOH — SOCIAL STABILITY: SOCIAL INSECURITY: HAS ANYONE EVER THREATENED TO HURT YOUR FAMILY OR YOUR PETS?: NO

## 2023-12-20 SDOH — SOCIAL STABILITY: SOCIAL INSECURITY: HAVE YOU HAD THOUGHTS OF HARMING ANYONE ELSE?: NO

## 2023-12-20 SDOH — SOCIAL STABILITY: SOCIAL INSECURITY: DO YOU FEEL ANYONE HAS EXPLOITED OR TAKEN ADVANTAGE OF YOU FINANCIALLY OR OF YOUR PERSONAL PROPERTY?: NO

## 2023-12-20 SDOH — SOCIAL STABILITY: SOCIAL INSECURITY: DOES ANYONE TRY TO KEEP YOU FROM HAVING/CONTACTING OTHER FRIENDS OR DOING THINGS OUTSIDE YOUR HOME?: NO

## 2023-12-20 ASSESSMENT — COGNITIVE AND FUNCTIONAL STATUS - GENERAL
MOVING FROM LYING ON BACK TO SITTING ON SIDE OF FLAT BED WITH BEDRAILS: A LOT
TURNING FROM BACK TO SIDE WHILE IN FLAT BAD: A LOT
STANDING UP FROM CHAIR USING ARMS: A LOT
HELP NEEDED FOR BATHING: A LOT
STANDING UP FROM CHAIR USING ARMS: A LOT
TOILETING: A LOT
EATING MEALS: A LITTLE
TOILETING: A LOT
TURNING FROM BACK TO SIDE WHILE IN FLAT BAD: A LOT
MOVING TO AND FROM BED TO CHAIR: A LOT
MOBILITY SCORE: 12
WALKING IN HOSPITAL ROOM: A LOT
WALKING IN HOSPITAL ROOM: A LOT
MOVING TO AND FROM BED TO CHAIR: A LOT
PERSONAL GROOMING: A LOT
PERSONAL GROOMING: A LOT
CLIMB 3 TO 5 STEPS WITH RAILING: A LOT
DRESSING REGULAR LOWER BODY CLOTHING: A LOT
DAILY ACTIVITIY SCORE: 13
DRESSING REGULAR LOWER BODY CLOTHING: A LOT
HELP NEEDED FOR BATHING: A LOT
DRESSING REGULAR UPPER BODY CLOTHING: A LOT
MOVING FROM LYING ON BACK TO SITTING ON SIDE OF FLAT BED WITH BEDRAILS: A LOT
CLIMB 3 TO 5 STEPS WITH RAILING: A LOT
EATING MEALS: A LITTLE
DRESSING REGULAR UPPER BODY CLOTHING: A LOT
DAILY ACTIVITIY SCORE: 13
PATIENT BASELINE BEDBOUND: NO
MOBILITY SCORE: 12

## 2023-12-20 ASSESSMENT — ACTIVITIES OF DAILY LIVING (ADL)
JUDGMENT_ADEQUATE_SAFELY_COMPLETE_DAILY_ACTIVITIES: YES
TOILETING: INDEPENDENT
GROOMING: INDEPENDENT
ADEQUATE_TO_COMPLETE_ADL: YES
DRESSING YOURSELF: INDEPENDENT
HEARING - RIGHT EAR: FUNCTIONAL
FEEDING YOURSELF: INDEPENDENT
LACK_OF_TRANSPORTATION: NO
BATHING: INDEPENDENT
WALKS IN HOME: INDEPENDENT
HEARING - LEFT EAR: FUNCTIONAL
PATIENT'S MEMORY ADEQUATE TO SAFELY COMPLETE DAILY ACTIVITIES?: YES

## 2023-12-20 ASSESSMENT — PATIENT HEALTH QUESTIONNAIRE - PHQ9
1. LITTLE INTEREST OR PLEASURE IN DOING THINGS: NOT AT ALL
2. FEELING DOWN, DEPRESSED OR HOPELESS: NOT AT ALL
SUM OF ALL RESPONSES TO PHQ9 QUESTIONS 1 & 2: 0

## 2023-12-20 ASSESSMENT — PAIN SCALES - GENERAL
PAINLEVEL_OUTOF10: 6
PAINLEVEL_OUTOF10: 2
PAINLEVEL_OUTOF10: 5 - MODERATE PAIN
PAINLEVEL_OUTOF10: 4
PAINLEVEL_OUTOF10: 3

## 2023-12-20 ASSESSMENT — LIFESTYLE VARIABLES
SUBSTANCE_ABUSE_PAST_12_MONTHS: NO
AUDIT-C TOTAL SCORE: 0
HOW OFTEN DO YOU HAVE 6 OR MORE DRINKS ON ONE OCCASION: NEVER
HOW MANY STANDARD DRINKS CONTAINING ALCOHOL DO YOU HAVE ON A TYPICAL DAY: PATIENT DOES NOT DRINK
PRESCIPTION_ABUSE_PAST_12_MONTHS: NO
AUDIT-C TOTAL SCORE: 0
SKIP TO QUESTIONS 9-10: 1
HOW OFTEN DO YOU HAVE A DRINK CONTAINING ALCOHOL: NEVER

## 2023-12-20 ASSESSMENT — PAIN DESCRIPTION - ORIENTATION: ORIENTATION: MID

## 2023-12-20 ASSESSMENT — PAIN - FUNCTIONAL ASSESSMENT: PAIN_FUNCTIONAL_ASSESSMENT: 0-10

## 2023-12-20 NOTE — PROGRESS NOTES
"Demetria Enriquez  [id number]   1950       Subjective/      This is the first morning after emergent repair of incarcerated hernia    No major events overnight    Patient currently complaining of incisional Pain    Tolerating liquids    Voided                  Heart Rate:  []   Temp:  [36 °C (96.8 °F)-36.6 °C (97.9 °F)]   Resp:  [16-24]   BP: (119-198)/()   Height:  [167.6 cm (5' 6\")]   Weight:  [62.1 kg (137 lb)-63.1 kg (139 lb 2 oz)]   SpO2:  [94 %-99 %]     Intake/Output Summary (Last 24 hours) at 12/20/2023 0722  Last data filed at 12/20/2023 0656  Gross per 24 hour   Intake 3154.17 ml   Output 30 ml   Net 3124.17 ml          Physical Exam  Abdominal:      Comments: Dressing intact    Incisional tenderness    Drain serosanguineous                Results for orders placed or performed during the hospital encounter of 12/19/23 (from the past 24 hour(s))   CBC and Auto Differential   Result Value Ref Range    WBC 9.8 4.4 - 11.3 x10*3/uL    nRBC 0.0 0.0 - 0.0 /100 WBCs    RBC 4.17 4.00 - 5.20 x10*6/uL    Hemoglobin 12.0 12.0 - 16.0 g/dL    Hematocrit 36.0 36.0 - 46.0 %    MCV 86 80 - 100 fL    MCH 28.8 26.0 - 34.0 pg    MCHC 33.3 32.0 - 36.0 g/dL    RDW 16.8 (H) 11.5 - 14.5 %    Platelets 369 150 - 450 x10*3/uL    Neutrophils % 68.1 40.0 - 80.0 %    Immature Granulocytes %, Automated 0.5 0.0 - 0.9 %    Lymphocytes % 23.5 13.0 - 44.0 %    Monocytes % 6.9 2.0 - 10.0 %    Eosinophils % 0.4 0.0 - 6.0 %    Basophils % 0.6 0.0 - 2.0 %    Neutrophils Absolute 6.66 (H) 1.60 - 5.50 x10*3/uL    Immature Granulocytes Absolute, Automated 0.05 0.00 - 0.50 x10*3/uL    Lymphocytes Absolute 2.30 0.80 - 3.00 x10*3/uL    Monocytes Absolute 0.67 0.05 - 0.80 x10*3/uL    Eosinophils Absolute 0.04 0.00 - 0.40 x10*3/uL    Basophils Absolute 0.06 0.00 - 0.10 x10*3/uL   Comprehensive metabolic panel   Result Value Ref Range    Glucose 95 74 - 99 mg/dL    Sodium 126 (L) 136 - 145 mmol/L    Potassium 4.0 3.5 - 5.3 mmol/L    " Chloride 94 (L) 98 - 107 mmol/L    Bicarbonate 20 (L) 21 - 32 mmol/L    Anion Gap 16 10 - 20 mmol/L    Urea Nitrogen 14 6 - 23 mg/dL    Creatinine 0.59 0.50 - 1.05 mg/dL    eGFR >90 >60 mL/min/1.73m*2    Calcium 9.5 8.6 - 10.3 mg/dL    Albumin 4.2 3.4 - 5.0 g/dL    Alkaline Phosphatase 112 33 - 136 U/L    Total Protein 6.4 6.4 - 8.2 g/dL    AST 19 9 - 39 U/L    Bilirubin, Total 0.5 0.0 - 1.2 mg/dL    ALT 9 7 - 45 U/L   Lactate   Result Value Ref Range    Lactate 1.5 0.4 - 2.0 mmol/L        I reviewed the above studies      ECG 12 Lead    Result Date: 12/18/2023  Normal sinus rhythm Early transition Normal ECG When compared with ECG of 11-OCT-2023 23:00, No significant change was found Confirmed by Binu Donovan ([id number]) on 12/18/2023 10:03:47 PM    CT abdomen pelvis w IV contrast    Result Date: 12/18/2023  Interpreted By:  Johan Reyes, STUDY: CT ABDOMEN PELVIS W IV CONTRAST;  12/18/2023 12:04 am   INDICATION: Signs/Symptoms:ABD PAIN.   COMPARISON: 10/12/2023   ACCESSION NUMBER(S): [id number]   ORDERING CLINICIAN: DEIDRE MICHELE   TECHNIQUE: Contiguous axial images of the abdomen and pelvis were obtained after the intravenous administration of  contrast. Coronal and sagittal reformatted images were obtained from the axial images.   FINDINGS: There is limited evaluation the lung bases. Mild basilar subsegmental atelectasis. No pleural effusion.   2.2 cm cyst in the left hepatic lobe. The gallbladder is present. No significant dilatation of the common bile duct.   The pancreas, spleen, and adrenal glands appear unremarkable.   Symmetric enhancement of the kidneys. No hydronephrosis.   There is stable defects in the midline abdominal wall with 4.9 cm x 2.2 cm and 6 cm x 2.1 cm fat containing anterior abdominal hernias with mild edema and fluid in the inferior hernia.   There is moderate to large amount of stool throughout the colon. There is also evidence of fecalization of small bowel loops. There is however no  evidence of significant small bowel dilatation with small-bowel loops measuring 2.5 cm in diameter.   Urinary bladder appears unremarkable.   There is redemonstration of subacute comminuted fracture of right superior pubic ramus and subacute fracture right inferior pubic ramus with increased displacement in comparison to the prior examination.   There are bilateral subacute comminuted sacral ala fractures with sclerosis of the sacrum. Subacute displaced fracture of the sacrum at S3 with acute angulation of the sacrum.       Moderate to large colonic stool burden; please correlate for constipation. There is also evidence of fecalization of small bowel loops compatible with bowel stasis and mild small bowel dilatation with may be secondary to ileus. No definite evidence of transition point.   Bilateral comminuted subacute sacral ala fractures with sclerosis of the sacrum. Subacute displaced fracture at S3 with acute angulation of the sacrum. Subacute fractures of right superior and inferior pubic ramus with increased displacement of right inferior pubic ramus fracture.   4.9 cm x 2.2 cm and 6 cm x 2.1 cm fat containing anterior abdominal hernias with mild edema and fluid in the inferior hernia.   MACRO: None   Signed by: Johan Reyes 12/18/2023 12:50 AM Dictation workstation:   [id number]       I have reviewed the images and the reports        Assessment/      Stable postop    Plan/    Up in chair  Ambulate    Anticipate discharge tomorrow      Binu Giralod MD

## 2023-12-20 NOTE — ANESTHESIA PREPROCEDURE EVALUATION
Patient: Demetria Enriquez    Procedure Information       Anesthesia Start Date/Time: 12/19/23 2029    Procedure: Repair Ventral Hernia    Location: [facility] / [facility]    Surgeons: Binu Giraldo MD            [unfilled]    Clinical information reviewed:    Allergies  Meds               NPO Detail:  No data recorded     Physical Exam    Airway  Mallampati: II  TM distance: >3 FB  Neck ROM: full     Cardiovascular   Rhythm: regular  Rate: normal     Dental - normal exam     Pulmonary   Breath sounds clear to auscultation     Abdominal            Anesthesia Plan    ASA 2 - emergent     general     The patient is not a current smoker.    intravenous induction   Postoperative administration of opioids is intended.  Anesthetic plan and risks discussed with patient.    Plan discussed with [facility]AA.

## 2023-12-20 NOTE — PROGRESS NOTES
"Nutrition Initial Assessment:   Nutrition Assessment    Reason for Assessment: Admission nursing screening (MST score 3 (wt loss and decreased appetite), with RD consult needed; however, not ordered.)    Patient is a 73 y.o. female presenting from home w/ and daughter for strangulated ventral hernia. POD1 hernia repair. Surgery on consult.    Past Medical History   has a past medical history of Drusen (degenerative) of macula, unspecified eye (02/19/2015).  Surgical History   has a past surgical history that includes Other surgical history (03/06/2020).    Nutrition History:  Energy Intake: Poor < 50 %  Food and Nutrient History: Pt w/regular diet typically w/3 meals a day until a few months ago. Pt w/fall in September and then a UTI which impacted her appetite. Pt now eating toast and tea for breakfast, grazes during the day then has a dinner meal (eating only ~50% now). Pt prefers room temperature beverages.  Vitamin/Herbal Supplement Use: pt does not take ONS.  Food Allergies/Intolerances:  None  GI Symptoms: Constipation and Abdominal pain  Oral Problems: None       Anthropometrics:  Height: 167.6 cm (5' 6\")   Weight: 62.1 kg (137 lb)   BMI (Calculated): 22.12             Weight History:     Weight Change %:  Weight History / % Weight Change: pt reports # and reports 15-17# wt loss. Per archives 12/7/23 142#, 6/16/23 150#, 11/20 157#. Wt loss of 8.6% x 6 months, 3.5% wt loss x 2 weeks.  Significant Weight Loss: No    Nutrition Focused Physical Exam Findings:    Subcutaneous Fat Loss:   Orbital Fat Pads: Well nourshed (slightly bulging fat pads)  Buccal Fat Pads: Well nourished (full, rounded cheeks)  Triceps: Well nourished (ample fat tissue)  Muscle Wasting:  Temporalis: Well nourished (well-defined muscle)  Pectoralis (Clavicular Region): Well nourished (clavicle not visible)  Quadriceps: Well nourished (well developed, well rounded)  Edema:  Edema: none  Physical Findings:  Skin:  (mid abd " "incision)    Nutrition Significant Labs:  CBC Trend:   Results from last 7 days   Lab Units 12/20/23  0617 12/19/23  1901 12/17/23  2241   WBC AUTO x10*3/uL 12.4* 9.8 7.4   RBC AUTO x10*6/uL 3.81* 4.17 3.95*   HEMOGLOBIN g/dL 10.8* 12.0 11.4*   HEMATOCRIT % 33.6* 36.0 34.7*   MCV fL 88 86 88   PLATELETS AUTO x10*3/uL 314 369 286    , BMP Trend:   Results from last 7 days   Lab Units 12/20/23  0617 12/19/23  1901 12/17/23  2241   GLUCOSE mg/dL 139* 95 100*   CALCIUM mg/dL 9.1 9.5 8.2*   SODIUM mmol/L 128* 126* 131*   POTASSIUM mmol/L 4.3 4.0 3.4*   CO2 mmol/L 23 20* 22   CHLORIDE mmol/L 97* 94* 101   BUN mg/dL 11 14 16   CREATININE mg/dL 0.49* 0.59 0.50    , A1C:No results found for: \"HGBA1C\", BG POCT trend:    , Liver Function Trend:   Results from last 7 days   Lab Units 12/19/23  1901 12/17/23  2241   ALK PHOS U/L 112 89   AST U/L 19 17   ALT U/L 9 9   BILIRUBIN TOTAL mg/dL 0.5 0.4    , Renal Lab Trend:   Results from last 7 days   Lab Units 12/20/23  0617 12/19/23  1901 12/17/23  2241   POTASSIUM mmol/L 4.3 4.0 3.4*   PHOSPHORUS mg/dL 3.4  --   --    SODIUM mmol/L 128* 126* 131*   MAGNESIUM mg/dL 2.02  --   --    EGFR mL/min/1.73m*2 >90 >90 >90   BUN mg/dL 11 14 16   CREATININE mg/dL 0.49* 0.59 0.50    , Lipid Panel: No results found for: \"CHOL\", \"HDL\", \"CHHDL\", \"LDLF\", \"VLDL\", \"TRIG\" , Vit D: No results found for: \"VITD25\" , Vit B12: No results found for: \"CMYKROCV87\" , Iron Panel: No results found for: \"IRON\", \"TIBC\", \"FERRITIN\" , Folate: No results found for: \"FOLATE\"     Nutrition Specific Medications:      I/O:    ;          Dietary Orders (From admission, onward)       Start     Ordered    12/20/23 1521  Oral nutritional supplements  Until discontinued        Comments: Vanilla - once diet advanced beyond clear liquids.   Question Answer Comment   Deliver with Breakfast    Deliver with Dinner    Select supplement: Ensure Plus High Protein        12/20/23 1522    12/20/23 1520  Oral nutritional supplements  " Until discontinued        Comments: TID w/meals while on clear liquids then will transition to Ensure Plus HP   Question Answer Comment   Deliver with All meals    Select supplement: Ensure Clear        12/20/23 1522    12/20/23 0600  Adult diet Clear Liquid  Diet effective now        Question:  Diet type  Answer:  Clear Liquid    12/19/23 1559                     Estimated Needs:   Total Energy Estimated Needs (kCal):  (1550-1850kcal/d (25-30kcal/kg))     Total Protein Estimated Needs (g):  (62-86g/d (1.0-1.4g/kg))        Method for Estimating Needs: 1mL/kcal        Nutrition Diagnosis   Malnutrition Diagnosis  Patient has Malnutrition Diagnosis: No    Nutrition Diagnosis  Patient has Nutrition Diagnosis: Yes  Diagnosis Status (1): New  Nutrition Diagnosis 1: Unintended weight loss  Related to (1): decreased appetite  As Evidenced by (1): 8.6% wt loss x 6 months.  Additional Nutrition Diagnosis: Diagnosis 2  Diagnosis Status (2): New  Nutrition Diagnosis 2: Altered GI function  Related to (2): abd pain  As Evidenced by (2): POD 1 ventral hernia repair.       Nutrition Interventions/Recommendations         Nutrition Prescription:  Individualized Nutrition Prescription Provided for : Diet: advance diet as medically appropriate/tolerated to goal of regular diet.        Nutrition Interventions:   Food and/or Nutrient Delivery Interventions  Interventions: Meals and snacks, Medical food supplement  Meals and Snacks: General healthful diet  Goal: consume 75% of meals and ONS.  Medical Food Supplement: Commercial beverage  Goal: will provide Ensure Clear (240kcal, 8g pro per 8 oz serving) TID w/meals while on clear liquid diet. After diet advancement, will transition to Ensure Plus High Protein (350kcal, 20g pro per 8oz serving) at breakfast and dinner meals.         Nutrition Education:   Education Documentation  Nutrition Care Manual, taught by Poornima Lock RD at 12/20/2023  3:39 PM.  Learner: Family,  "Patient  Readiness: Acceptance  Method: Explanation, Handout  Response: Verbalizes Understanding  Comment: Provided AND handout from [facility] \"Constipation Nutrition Therapy\" and reviewed w/pt and .        AY[facility] ordering reviewed w/pt. Encouraged PO intakes and will trial oral nutrition supplements. Will follow plan of care for education needs.       Nutrition Monitoring and Evaluation   Food/Nutrient Related History Monitoring  Monitoring and Evaluation Plan: Energy intake  Energy Intake: Estimated energy intake  Criteria: pt will meet >75% of estimated needs. Pt will consume provided supplement.    Body Composition/Growth/Weight History  Monitoring and Evaluation Plan: Weight  Weight: Measured weight  Criteria: maintain stable wt.         Nutrition Focused Physical Findings  Monitoring and Evaluation Plan: Skin  Skin: Other (Comment)  Criteria: promote incision wound healing.       Time Spent/Follow-up Reminder:   Time Spent (min): 60 minutes  Follow up: Provided inpatient RDN contact information  Last Date of Nutrition Visit: 12/20/23  Nutrition Follow-Up Needed?: 3-8 days  Follow up Comment: NORMA    "

## 2023-12-20 NOTE — CARE PLAN
Problem: Skin  Goal: Participates in plan/prevention/treatment measures  Outcome: Progressing  Goal: Prevent/manage excess moisture  Outcome: Progressing  Goal: Prevent/minimize sheer/friction injuries  Outcome: Progressing  Goal: Promote/optimize nutrition  Outcome: Progressing  Goal: Promote skin healing  Outcome: Progressing     Problem: Nutrition  Goal: Less than 5 days NPO/clear liquids  Outcome: Progressing  Goal: Oral intake greater 75%  Outcome: Progressing  Goal: Consume prescribed supplement  Outcome: Progressing  Goal: Promote healing  Outcome: Progressing  Goal: Maintain stable weight  Outcome: Progressing   The patient's goals for the shift include      The clinical goals for the shift include pain control after surgery

## 2023-12-20 NOTE — ANESTHESIA PROCEDURE NOTES
Peripheral IV  Date/Time: 12/19/2023 9:07 PM  Inserted by: VIVEK Pennington    Placement  Needle size: 16 G  Laterality: left  Location: wrist  Local anesthetic: none  Site prep: alcohol  Technique: anatomical landmarks  Attempts: 1

## 2023-12-20 NOTE — ANESTHESIA PROCEDURE NOTES
Airway  Date/Time: 12/19/2023 9:00 PM  Urgency: elective    Airway not difficult    Staffing  Performed: VIVEK   Authorized by: Fausto Murcia MD    Performed by: VIVEK Pennington  Patient location during procedure: OR    Indications and Patient Condition  Indications for airway management: anesthesia  Spontaneous Ventilation: absent  Sedation level: deep  Preoxygenated: yes  Patient position: sniffing  MILS maintained throughout  Mask difficulty assessment: 0 - not attempted    Final Airway Details  Final airway type: endotracheal airway      Successful airway: ETT  Cuffed: yes   Successful intubation technique: direct laryngoscopy  Endotracheal tube insertion site: oral  Blade: Angelica  Blade size: #3  ETT size (mm): 7.5  Cormack-Lehane Classification: grade IIa - partial view of glottis  Placement verified by: chest auscultation and capnometry   Cuff volume (mL): 6  Measured from: lips  ETT to lips (cm): 22  Number of attempts at approach: 1

## 2023-12-20 NOTE — OP NOTE
Repair Ventral Hernia Operative Note     Date: 2023  OR Location: [facility] OR    Name: Demetria Enriquez, : 1950, Age: 73 y.o., MRN: [id number], Sex: female    Diagnosis  Pre-op Diagnosis     * Strangulated ventral hernia [K43.6] Post-op Diagnosis     * Strangulated ventral hernia [K43.6]     Procedures  Repair Ventral Hernia  [id number] - [place] RPR AA HERNIA 1ST 3-10 CM REDUCIBLE      Surgeons      * Binu Giraldo - Primary    Resident/Fellow/Other Assistant:  Surgeon(s) and Role:    Procedure Summary  Anesthesia: General  ASA: II  Anesthesia Staff: Anesthesiologist: Fausto Murcia MD  C-AA: VIVEK Pennington  Estimated Blood Loss: Minimal patient is a 73-year-old female presents with incarcerated mL  Intra-op Medications: * No intraprocedure medications in log *           Anesthesia Record               Intraprocedure I/O Totals       None           Specimen:   ID Type Source Tests Collected by Time   1 : 1) HERNIA TISSUE Tissue HERNIA SAC SURGICAL PATHOLOGY EXAM Binu Giraldo MD 2023 2201        Staff:   Circulator: Pavel Kaiser RN  Scrub Person: Radha Blank; Rosie PALACIOS Case         Drains and/or Catheters:   Closed/Suction Drain Medial RUQ 10 Fr. (Active)       Tourniquet Times:         Implants:  Implants       Type Name Action Serial No.      Surgical Mesh Sling Implant PATCH, HERNIA, VENTRIO ST, LRG OVAL, 13.8 X 17.8CM - [id number] Implanted          Ventral hernia in the epigastric midline.  Hernia was not reducible.  Recommend go to the operating room for urgent exploration.    The risk benefits indications for this reviewed with her and her family.  Potential bleeding infection MI PE death etc. reviewed.  Potential of damage to hollow viscus requiring intestinal resection with or without ileostomy or colostomy was reviewed.  Potential blood products was reviewed.  Use of mesh and prosthetic materials was reviewed.  All questions were answered and consent was obtained.    Patient was taken  to the operating room placed on table in supine position.  General esthesia was obtained.  Abdomen prepped and draped in a sterile fashion.  Antibiotics were given.  DVT prophylaxis obtained using subcutaneous heparin placement of external meta compression stockings timeout procedures were followed    Vertical incision was made over the hernial mass in the epigastric midline dissection carried out her mass was encountered and  from the subcutaneous tissues.  During the process of dissection was discovered directly to hernial masses.  The more cephalad 1 was approximately a centimeter defect with some incarcerated preperitoneal fat.  This was now thoroughly open the abdomen entered again there was only incarcerated preperitoneal fat in this defect.    The small bridge between the 2 defects was now incised and the larger sac was entered.  There was some omentum within this is reduced back into the abdomen.  There is no hollow viscera within this.    The sac both lesions was now excised    All of the hollow viscera and the area was now elevated in the field and carefully inspected.  Some all the small bowel was examined noted to be gross unremarkable.  There was 1 area that might had a faint amount of erythema however there was no evidence of discoloration, loss of pulsations thrombosis or other issue    All of this reduced back into the abdomen    Was elected to repair this with a primary closure was a prosthetic mesh underlay.    A piece of polypropylene mesh with a biologic coding was brought onto the field.  Again this is approximately a 13.8 x 17.8 cm patch.  Traction stitches were placed at 12:00 3:00 6:00 and 9:00 positions as well as stitches in between the cardinal points.    Mesh was now positioned in the abdomen with the coating direct against intra-abdominal contents and the polypropylene aspect of the mesh directedness posterior aspect of the abdominal wall    Suture Trula device was now inserted  first at the 12 o'clock position immediately below the xiphoid this traction stitch was captured.  Again number Akin was used to make a nick in the skin just above the umbilicus and the 6:00 traction stitch was captured.  All of the other no tract this process was now repeated all the traction stitches were captured    Mesh was noted to deploy nicely with no bleeding or redundancy.  All the traction stitches were now tied and secured.    The other edge of the mesh was secured using absorbable tacks    Fascial defect was now closed vertically using interrupted #1 PDS.    Counts reported as correct subcutaneous tissues were irrigated made hemostatic and closed with 3-0 Vicryl skin was closed with staples    Prior to closure a 10 mm flat [place] drain was placed on top of the fascia closure and brought out through a lateral stab incision anchored to the skin with a stitch    Again there is no damage to the underlying hollow viscera.  Patient tolerated procedure well    Attending Attestation: I performed the procedure.    Binu Giraldo  Phone Number: [phone]

## 2023-12-20 NOTE — ANESTHESIA POSTPROCEDURE EVALUATION
Patient: Demetria Enriquez    Procedure Summary       Date: 12/19/23 Room / Location: [facility] OR 02 / Virtual [facility] OR    Anesthesia Start: 2042 Anesthesia Stop: 2227    Procedure: Repair Ventral Hernia Diagnosis:       Strangulated ventral hernia      (Strangulated ventral hernia [K43.6])    Surgeons: Binu Giraldo MD Responsible Provider: Fausto Murcia MD    Anesthesia Type: general ASA Status: 2 - Emergent            Anesthesia Type: general    Vitals Value Taken Time   /64 12/19/23 2227   Temp 36.5 12/19/23 2227   Pulse 88 12/19/23 2227   Resp 16 12/19/23 2227   SpO2 98 12/19/23 2227       Anesthesia Post Evaluation    Patient location during evaluation: PACU  Patient participation: complete - patient participated  Level of consciousness: awake and alert  Pain management: satisfactory to patient  Airway patency: patent  Cardiovascular status: acceptable  Respiratory status: acceptable and face mask  Hydration status: acceptable  Postoperative Nausea and Vomiting: none        No notable events documented.

## 2023-12-20 NOTE — H&P
Demetria Enriquez   [id number]   1950         Chief Complaint/      Possible incarcerated hernia 73-year-old female seen for possible incarcerated hernia                HPI/    Patient with a known epigastric midline primary hernia.  Patient scheduled for elective repair in several weeks    Patient states that the hernia started to hurt this afternoon.  Became worse and worse.  She notes the hernia was popped out and she could not reduce it.  Patient was seen in the emergency room noted to have an incarcerated hernia.    As of 1930, patient complains of pain.  Denies any fever or chills or nausea    Attempted to reduce this by the emergency room staff have been unsuccessful      Past Medical History:   Diagnosis Date    Drusen (degenerative) of macula, unspecified eye 02/19/2015    Macular drusen      Denies hypertension, diabetes, coronary disease    Back pain    Chronic constipation  Social History     Socioeconomic History    Marital status:      Spouse name: Not on file    Number of children: Not on file    Years of education: Not on file    Highest education level: Not on file   Occupational History    Not on file   Tobacco Use    Smoking status: Never    Smokeless tobacco: Never   Vaping Use    Vaping Use: Never used   Substance and Sexual Activity    Alcohol use: Defer    Drug use: Defer    Sexual activity: Defer   Other Topics Concern    Not on file   Social History Narrative    Not on file     Social Determinants of Health     Financial Resource Strain: Not on file   Food Insecurity: Not on file   Transportation Needs: Not on file   Physical Activity: Not on file   Stress: Not on file   Social Connections: Not on file   Intimate Partner Violence: Not on file   Housing Stability: Not on file      Non-smoker    No family history on file.     Review of Systems   All other systems reviewed and are negative.         Current Facility-Administered Medications:     heparin (porcine) injection 5,000 Units,  5,000 Units, subcutaneous, q8h, Binu Giraldo MD    Current Outpatient Medications:     acetaminophen (TylenoL) 325 mg capsule, , Disp: , Rfl:     ALPRAZolam (Xanax) 0.5 mg tablet, Take 1 tablet (0.5 mg) by mouth 3 times a day as needed., Disp: , Rfl:     artificial tears, dextran-hypomel-glycerin, 0.1-0.3-0.2 % ophthalmic solution, Administer 1 drop into affected eye(s) 4 times a day as needed., Disp: , Rfl:     ascorbic acid (VITAMIN C ORAL), Take by mouth., Disp: , Rfl:     Bacillus subtilis-inulin 1.5 billion cell-1 gram tablet,chewable, , Disp: , Rfl:     biotin 1 mg capsule, , Disp: , Rfl:     calcium carb/vitamin D2/soyb (CALTRATE 600 WITH SOY ORAL), Take 1,200 mg by mouth once daily., Disp: , Rfl:     calcium carbonate-vitamin D3 (Calcium 500 + D) 500 mg-10 mcg (400 unit) chewable tablet, Chew 1 tablet once daily., Disp: , Rfl:     cetirizine (ZYRTEC) 10 mg capsule, Take 1 capsule (10 mg) by mouth if needed., Disp: , Rfl:     cholecalciferol (Vitamin D-3) 25 MCG (1000 UT) capsule, , Disp: , Rfl:     citalopram (CeleXA) 10 mg tablet, Take 4 tablets (40 mg) by mouth once daily., Disp: , Rfl:     clotrimazole-betamethasone (Lotrisone) cream, Apply topically 2 times a day., Disp: , Rfl:     diclofenac sodium (Voltaren) 1 % gel gel, Apply 0.5 Applications topically every 6 hours if needed (to control inflammation and pain)., Disp: , Rfl:     diphenhydrAMINE (Benadryl Allergy) 25 mg tablet, Take by mouth., Disp: , Rfl:     doxepin (SINEquan) 10 mg capsule, , Disp: , Rfl:     escitalopram (Lexapro) 10 mg tablet, Take 1 tablet (10 mg) by mouth once daily., Disp: , Rfl:     estradiol (Estrace) 0.01 % (0.1 mg/gram) vaginal cream, USE AS DIRECTED, Disp: , Rfl:     faricimab-svoa (Vabysmo) 6 mg/0.05 mL solution injection, by intravitreal route., Disp: , Rfl:     gabapentin (Neurontin) 400 mg capsule, Take 1 capsule (400 mg) by mouth 2 times a day., Disp: , Rfl:     HYDROcodone-acetaminophen (Norco) 5-325 mg tablet, ,  Disp: , Rfl:     hydrOXYzine HCL (Atarax) 25 mg tablet, , Disp: , Rfl:     levothyroxine (Synthroid) 25 mcg tablet, Take by mouth., Disp: , Rfl:     levothyroxine (Tirosint) 50 mcg capsule, Take 1 capsule (50 mcg) by mouth once daily., Disp: , Rfl:     MAGNESIUM ORAL, , Disp: , Rfl:     melatonin 10 mg capsule, Take 1 capsule (10 mg) by mouth once daily at bedtime., Disp: , Rfl:     naproxen (Naprosyn) 500 mg tablet, , Disp: , Rfl:     omega-3 (Fish OiL) 60- mg capsule, Take by mouth., Disp: , Rfl:     omeprazole (PriLOSEC) 40 mg DR capsule, PLEASE SEE ATTACHED FOR DETAILED DIRECTIONS, Disp: , Rfl:     peg 400-propylene glycol (Systane, propylene glycoL,) 0.4-0.3 % drops ophthalmic drops, , Disp: , Rfl:     pseudoephedrine HCl (SUDAFED ORAL), Take by mouth once daily., Disp: , Rfl:     simvastatin (Zocor) 20 mg tablet, , Disp: , Rfl:     SUMAtriptan (Imitrex) 100 mg tablet, Take 1 tablet (100 mg) by mouth once daily as needed for migraine. MAY REPEAT ONCE IN 2 HOURS FOR 30 DAYS, Disp: , Rfl:     SUMAtriptan (Imitrex) 50 mg tablet, Take 1 tablet (50 mg) by mouth 1 time if needed for migraine., Disp: , Rfl:     tamsulosin (Flomax) 0.4 mg 24 hr capsule, Take 2 capsules (0.8 mg) by mouth once daily at bedtime., Disp: 60 capsule, Rfl: 3    tiZANidine (Zanaflex) 4 mg tablet, Take 1 tablet (4 mg) by mouth every 6 hours if needed for muscle spasms., Disp: , Rfl:     vit C,E-Bu-mqgwt-lutein-zeaxan (PreserVision AREDS-2) 250-90-40-1 mg capsule, Take 1 capsule by mouth twice a day., Disp: , Rfl:     VITAMIN E ACETATE ORAL, Take by mouth., Disp: , Rfl:      Allergies   Allergen Reactions    Tramadol Headache     Tolerates morphine 10/2023, tolerates Norco PDMP    Valacyclovir Hives and Rash    Adhesive Tape-Silicones Itching    Azo [Phenazopyridine] Nausea/vomiting    Cephalexin Unknown    Trazodone Other     Gave Migraine    Macrobid [Nitrofurantoin Monohyd/M-Cryst] Rash        ECG 12 Lead  Normal sinus rhythm  Early  transition  Normal ECG  When compared with ECG of 11-OCT-2023 23:00,  No significant change was found  Confirmed by Binu Donovan ([id number]) on 12/18/2023 10:03:47 PM  CT abdomen pelvis w IV contrast  Narrative: Interpreted By:  Johan Reyes,   STUDY:  CT ABDOMEN PELVIS W IV CONTRAST;  12/18/2023 12:04 am      INDICATION:  Signs/Symptoms:ABD PAIN.      COMPARISON:  10/12/2023      ACCESSION NUMBER(S):  [id number]      ORDERING CLINICIAN:  DEIDRE MICHELE      TECHNIQUE:  Contiguous axial images of the abdomen and pelvis were obtained after  the intravenous administration of  contrast. Coronal and sagittal  reformatted images were obtained from the axial images.      FINDINGS:  There is limited evaluation the lung bases.  Mild basilar subsegmental atelectasis.  No pleural effusion.      2.2 cm cyst in the left hepatic lobe. The gallbladder is present. No  significant dilatation of the common bile duct.      The pancreas, spleen, and adrenal glands appear unremarkable.      Symmetric enhancement of the kidneys. No hydronephrosis.      There is stable defects in the midline abdominal wall with 4.9 cm x  2.2 cm and 6 cm x 2.1 cm fat containing anterior abdominal hernias  with mild edema and fluid in the inferior hernia.      There is moderate to large amount of stool throughout the colon.  There is also evidence of fecalization of small bowel loops. There is  however no evidence of significant small bowel dilatation with  small-bowel loops measuring 2.5 cm in diameter.      Urinary bladder appears unremarkable.      There is redemonstration of subacute comminuted fracture of right  superior pubic ramus and subacute fracture right inferior pubic ramus  with increased displacement in comparison to the prior examination.      There are bilateral subacute comminuted sacral ala fractures with  sclerosis of the sacrum. Subacute displaced fracture of the sacrum at  S3 with acute angulation of the sacrum.      Impression:  "Moderate to large colonic stool burden; please correlate for  constipation. There is also evidence of fecalization of small bowel  loops compatible with bowel stasis and mild small bowel dilatation  with may be secondary to ileus. No definite evidence of transition  point.      Bilateral comminuted subacute sacral ala fractures with sclerosis of  the sacrum. Subacute displaced fracture at S3 with acute angulation  of the sacrum. Subacute fractures of right superior and inferior  pubic ramus with increased displacement of right inferior pubic ramus  fracture.      4.9 cm x 2.2 cm and 6 cm x 2.1 cm fat containing anterior abdominal  hernias with mild edema and fluid in the inferior hernia.      MACRO:  None      Signed by: Johan Reyes 12/18/2023 12:50 AM  Dictation workstation:   [id number]       @[employer]@     /76   Pulse 90   Temp 36.6 °C (97.9 °F)   Resp 18   Ht 1.676 m (5' 6\")   Wt 62.1 kg (137 lb)   SpO2 98%   BMI 22.11 kg/m²        Physical Exam  Constitutional:       Appearance: Normal appearance.   HENT:      Head: Normocephalic and atraumatic.   Cardiovascular:      Rate and Rhythm: Normal rate and regular rhythm.   Pulmonary:      Effort: Pulmonary effort is normal.      Breath sounds: Normal breath sounds.   Abdominal:      Comments: Bulge in epigastric midline noted, bulge mildly tender    Bulge nonreducible    No defects in umbilicus or groin    Abdomen nondistended   Musculoskeletal:         General: Normal range of motion.      Cervical back: Normal range of motion.   Skin:     General: Skin is warm.   Neurological:      General: No focal deficit present.      Mental Status: She is alert and oriented to person, place, and time.                  Assessment/    Incarcerated ventral hernia epigastric midline    Plan/    Hernias not reducible and mildly tender.    Will proceed operating room tonight for emergent repair    Plan a general anesthesia.  Plan vertical incision.  Note made that " CT suggested may be too close adjacent defects.  The incarcerated component will need to be inspected and to be ensure there is no ischemic damage.  Repair will be carried out.  Plan to use mesh unless there is gross contamination.    Risk benefits indications for this reviewed with the patient and her family.  The potential bleeding infection MI PE death etc. reviewed.  The anticipated convalescence is reviewed.  Potential need for intestinal resection, ileostomy or colostomy blood transfusions that is reviewed.  All questions were answered and consent is obtained

## 2023-12-20 NOTE — NURSING NOTE
Patient is alert and oriented, though slightly confused. She asked for her IV to be removed which it was. Patient has bed alarm on and audible. Hourly rounding performed. Patient continues to complain of discomfort in her belly when she moves. Patient did have a bowel movement. VSS. Bed alarm on and audible.

## 2023-12-20 NOTE — BRIEF OP NOTE
Date: 2023  OR Location: [facility] OR    Name: Demetria Enriquez, : 1950, Age: 73 y.o., MRN: [id number], Sex: female    Diagnosis  Pre-op Diagnosis     * Strangulated ventral hernia [K43.6] Post-op Diagnosis     * Strangulated ventral hernia [K43.6]     Procedures  Repair Ventral Hernia  [id number] - [place] RPR AA HERNIA 1ST 3-10 CM REDUCIBLE      Surgeons      * Binu Giraldo - Primary    Resident/Fellow/Other Assistant:  Surgeon(s) and Role:    Procedure Summary  Anesthesia: General  ASA: II  Anesthesia Staff: Anesthesiologist: Fausto Murcia MD  C-AA: VIVEK Pennington  Estimated Blood Loss: Minimal mL  Intra-op Medications: * No intraprocedure medications in log *           Anesthesia Record               Intraprocedure I/O Totals       None           Specimen:   ID Type Source Tests Collected by Time   1 : 1) HERNIA TISSUE Tissue HERNIA SAC SURGICAL PATHOLOGY EXAM Binu Giraldo MD 2023        Staff:   Circulator: Pavel Kaiser RN  Scrub Person: Radha PALACIOS Case          Findings: 2 separate defects, incarcerated omentum, hollow viscera inspected with no obvious damage    Complications:  None; patient tolerated the procedure well.     Disposition: PACU - hemodynamically stable.  Condition: stable  Specimens Collected:   ID Type Source Tests Collected by Time   1 : 1) HERNIA TISSUE Tissue HERNIA SAC SURGICAL PATHOLOGY EXAM Binu Giraldo MD 2023     Attending Attestation: I performed the procedure.    Binu Giraldo  Phone Number: [phone]

## 2023-12-21 VITALS
DIASTOLIC BLOOD PRESSURE: 81 MMHG | SYSTOLIC BLOOD PRESSURE: 155 MMHG | BODY MASS INDEX: 22.02 KG/M2 | WEIGHT: 137 LBS | RESPIRATION RATE: 17 BRPM | OXYGEN SATURATION: 96 % | HEIGHT: 66 IN | TEMPERATURE: 97.7 F | HEART RATE: 81 BPM

## 2023-12-21 LAB
ANION GAP SERPL CALC-SCNC: 13 MMOL/L (ref 10–20)
BUN SERPL-MCNC: 7 MG/DL (ref 6–23)
CALCIUM SERPL-MCNC: 8.8 MG/DL (ref 8.6–10.3)
CHLORIDE SERPL-SCNC: 101 MMOL/L (ref 98–107)
CO2 SERPL-SCNC: 23 MMOL/L (ref 21–32)
CREAT SERPL-MCNC: 0.47 MG/DL (ref 0.5–1.05)
ERYTHROCYTE [DISTWIDTH] IN BLOOD BY AUTOMATED COUNT: 16.9 % (ref 11.5–14.5)
GFR SERPL CREATININE-BSD FRML MDRD: >90 ML/MIN/1.73M*2
GLUCOSE SERPL-MCNC: 96 MG/DL (ref 74–99)
HCT VFR BLD AUTO: 31 % (ref 36–46)
HGB BLD-MCNC: 10.2 G/DL (ref 12–16)
MCH RBC QN AUTO: 29.1 PG (ref 26–34)
MCHC RBC AUTO-ENTMCNC: 32.9 G/DL (ref 32–36)
MCV RBC AUTO: 88 FL (ref 80–100)
NRBC BLD-RTO: 0 /100 WBCS (ref 0–0)
PLATELET # BLD AUTO: 269 X10*3/UL (ref 150–450)
POTASSIUM SERPL-SCNC: 3.5 MMOL/L (ref 3.5–5.3)
RBC # BLD AUTO: 3.51 X10*6/UL (ref 4–5.2)
SODIUM SERPL-SCNC: 133 MMOL/L (ref 136–145)
WBC # BLD AUTO: 7.4 X10*3/UL (ref 4.4–11.3)

## 2023-12-21 PROCEDURE — 96376 TX/PRO/DX INJ SAME DRUG ADON: CPT

## 2023-12-21 PROCEDURE — 96372 THER/PROPH/DIAG INJ SC/IM: CPT | Performed by: SURGERY

## 2023-12-21 PROCEDURE — 99024 POSTOP FOLLOW-UP VISIT: CPT | Performed by: SURGERY

## 2023-12-21 PROCEDURE — 80048 BASIC METABOLIC PNL TOTAL CA: CPT | Performed by: NURSE PRACTITIONER

## 2023-12-21 PROCEDURE — 85027 COMPLETE CBC AUTOMATED: CPT | Performed by: NURSE PRACTITIONER

## 2023-12-21 PROCEDURE — 2500000004 HC RX 250 GENERAL PHARMACY W/ HCPCS (ALT 636 FOR OP/ED): Performed by: NURSE PRACTITIONER

## 2023-12-21 PROCEDURE — 36415 COLL VENOUS BLD VENIPUNCTURE: CPT | Performed by: NURSE PRACTITIONER

## 2023-12-21 PROCEDURE — 2500000004 HC RX 250 GENERAL PHARMACY W/ HCPCS (ALT 636 FOR OP/ED): Performed by: SURGERY

## 2023-12-21 PROCEDURE — G0378 HOSPITAL OBSERVATION PER HR: HCPCS

## 2023-12-21 RX ADMIN — HEPARIN SODIUM 5000 UNITS: 5000 INJECTION INTRAVENOUS; SUBCUTANEOUS at 05:25

## 2023-12-21 RX ADMIN — KETOROLAC TROMETHAMINE 15 MG: 15 INJECTION, SOLUTION INTRAMUSCULAR; INTRAVENOUS at 05:26

## 2023-12-21 NOTE — DISCHARGE SUMMARY
Discharge Diagnosis  Strangulated ventral hernia    Issues Requiring Follow-Up  Postop    Test Results Pending At Discharge  Pending Labs       Order Current Status    Basic Metabolic Panel In process    CBC In process    Surgical Pathology Exam In process            Hospital Course    73-year-old female presented with incarcerated midline primary ventral hernia.    Patient resuscitated and taken to the operating room.  Wound explored via midline incision.  Findings of incarcerated fat.  No hollow viscus injury.  Hernia repaired with primary closure and mesh underlay  Pertinent Physical Exam At Time of Discharge  Physical Exam  Abdominal:      Comments: Abdomen nondistended    Dressings intact    Minimal tenderness         Home Medications     Medication List      ASK your doctor about these medications     busPIRone 10 mg tablet; Commonly known as: Buspar   calcium carbonate 600 mg calcium (1,500 mg) tablet   cholecalciferol 25 MCG (1000 UT) tablet; Commonly known as: Vitamin D-3   citalopram 20 mg tablet; Commonly known as: CeleXA   docusate sodium 100 mg capsule; Commonly known as: Colace   hydrOXYzine HCL 25 mg tablet; Commonly known as: Atarax   levothyroxine 50 mcg tablet; Commonly known as: Synthroid, Levoxyl; Ask   about: Which instructions should I use?   magnesium oxide 500 mg tablet   melatonin 3 mg tablet   multivitamin with minerals tablet   naproxen 500 mg tablet; Commonly known as: Naprosyn   omeprazole 40 mg DR capsule; Commonly known as: PriLOSEC   polyethylene glycol 17 gram packet; Commonly known as: Glycolax,   Miralax; Ask about: Which instructions should I use?   psyllium 3.4 gram packet; Commonly known as: Metamucil   simvastatin 20 mg tablet; Commonly known as: Zocor   SUMAtriptan 100 mg tablet; Commonly known as: Imitrex; Ask about: Which   instructions should I use?   tamsulosin 0.4 mg 24 hr capsule; Commonly known as: Flomax; Take 2   capsules (0.8 mg) by mouth once daily at bedtime.    tiZANidine 4 mg tablet; Commonly known as: Zanaflex   Vabysmo 6 mg/0.05 mL solution injection; Generic drug: faricimab-svoa   vitamin E 450 mg (1000 unit) capsule       Outpatient Follow-Up  Future Appointments   Date Time Provider Department Center   1/2/2024 10:45 AM Binu Giraldo MD [id number] [place]   1/10/2024 11:40 AM Tuyet Wahl APRN-CNP [id number] [place]       Binu Giraldo MD

## 2023-12-21 NOTE — NURSING NOTE
Patient has been discharged. Patient was given all discharge information. Patients drain removed per order. Patient's IV removed. Patient was brought downstairs via wheelchair with  and daughter. Follow up instructions given.

## 2023-12-21 NOTE — CARE PLAN
Problem: Skin  Goal: Participates in plan/prevention/treatment measures  Outcome: Progressing  Goal: Prevent/manage excess moisture  Outcome: Progressing  Goal: Prevent/minimize sheer/friction injuries  Outcome: Progressing  Goal: Promote/optimize nutrition  Outcome: Progressing  Goal: Promote skin healing  Outcome: Progressing     Problem: Nutrition  Goal: Less than 5 days NPO/clear liquids  Outcome: Progressing  Goal: Oral intake greater 75%  Outcome: Progressing  Goal: Consume prescribed supplement  Outcome: Progressing  Goal: Promote healing  Outcome: Progressing  Goal: Maintain stable weight  Outcome: Progressing   The patient's goals for the shift include      The clinical goals for the shift include report pain tolerable level

## 2023-12-21 NOTE — PROGRESS NOTES
Demetrai Enriquez  [id number]   1950       Subjective/          Complaining of pain, but less pain than yesterday    Tolerating liquids    Ambulating in room    Voiding spontaneously              Heart Rate:  [71-84]   Temp:  [35.6 °C (96.1 °F)-37.3 °C (99.1 °F)]   Resp:  [16]   BP: (145-168)/(76-92)   SpO2:  [93 %-97 %]     Intake/Output Summary (Last 24 hours) at 12/21/2023 0700  Last data filed at 12/20/2023 2244  Gross per 24 hour   Intake 240 ml   Output 40 ml   Net 200 ml          Physical Exam  Abdominal:      Comments: Dressing intact    Drain minimal serous output    Nondistended    Mild diffuse tenderness but much less than yesterday                No results found for this or any previous visit (from the past 24 hour(s)).     I reviewed the above studies      ECG 12 Lead    Result Date: 12/18/2023  Normal sinus rhythm Early transition Normal ECG When compared with ECG of 11-OCT-2023 23:00, No significant change was found Confirmed by Binu Donovan ([id number]) on 12/18/2023 10:03:47 PM    CT abdomen pelvis w IV contrast    Result Date: 12/18/2023  Interpreted By:  Johan Reyes, STUDY: CT ABDOMEN PELVIS W IV CONTRAST;  12/18/2023 12:04 am   INDICATION: Signs/Symptoms:ABD PAIN.   COMPARISON: 10/12/2023   ACCESSION NUMBER(S): [id number]   ORDERING CLINICIAN: DEIDRE MICHELE   TECHNIQUE: Contiguous axial images of the abdomen and pelvis were obtained after the intravenous administration of  contrast. Coronal and sagittal reformatted images were obtained from the axial images.   FINDINGS: There is limited evaluation the lung bases. Mild basilar subsegmental atelectasis. No pleural effusion.   2.2 cm cyst in the left hepatic lobe. The gallbladder is present. No significant dilatation of the common bile duct.   The pancreas, spleen, and adrenal glands appear unremarkable.   Symmetric enhancement of the kidneys. No hydronephrosis.   There is stable defects in the midline abdominal wall with 4.9 cm x 2.2 cm and 6  cm x 2.1 cm fat containing anterior abdominal hernias with mild edema and fluid in the inferior hernia.   There is moderate to large amount of stool throughout the colon. There is also evidence of fecalization of small bowel loops. There is however no evidence of significant small bowel dilatation with small-bowel loops measuring 2.5 cm in diameter.   Urinary bladder appears unremarkable.   There is redemonstration of subacute comminuted fracture of right superior pubic ramus and subacute fracture right inferior pubic ramus with increased displacement in comparison to the prior examination.   There are bilateral subacute comminuted sacral ala fractures with sclerosis of the sacrum. Subacute displaced fracture of the sacrum at S3 with acute angulation of the sacrum.       Moderate to large colonic stool burden; please correlate for constipation. There is also evidence of fecalization of small bowel loops compatible with bowel stasis and mild small bowel dilatation with may be secondary to ileus. No definite evidence of transition point.   Bilateral comminuted subacute sacral ala fractures with sclerosis of the sacrum. Subacute displaced fracture at S3 with acute angulation of the sacrum. Subacute fractures of right superior and inferior pubic ramus with increased displacement of right inferior pubic ramus fracture.   4.9 cm x 2.2 cm and 6 cm x 2.1 cm fat containing anterior abdominal hernias with mild edema and fluid in the inferior hernia.   MACRO: None   Signed by: Johan Reyes 12/18/2023 12:50 AM Dictation workstation:   [id number]       I have reviewed the images and the reports        Assessment/    Stable postop      Plan/    Remove drain    Discharge to home      Binu Giraldo MD

## 2024-01-02 ENCOUNTER — OFFICE VISIT (OUTPATIENT)
Dept: SURGERY | Facility: CLINIC | Age: 74
End: 2024-01-02
Payer: MEDICARE

## 2024-01-02 VITALS
DIASTOLIC BLOOD PRESSURE: 82 MMHG | RESPIRATION RATE: 18 BRPM | TEMPERATURE: 97.1 F | SYSTOLIC BLOOD PRESSURE: 142 MMHG | OXYGEN SATURATION: 98 % | HEART RATE: 98 BPM

## 2024-01-02 DIAGNOSIS — K43.9 VENTRAL HERNIA WITHOUT OBSTRUCTION OR GANGRENE: Primary | ICD-10-CM

## 2024-01-02 LAB
LABORATORY COMMENT REPORT: NORMAL
PATH REPORT.FINAL DX SPEC: NORMAL
PATH REPORT.GROSS SPEC: NORMAL
PATH REPORT.RELEVANT HX SPEC: NORMAL
PATH REPORT.TOTAL CANCER: NORMAL

## 2024-01-02 PROCEDURE — 1111F DSCHRG MED/CURRENT MED MERGE: CPT | Performed by: SURGERY

## 2024-01-02 PROCEDURE — 1125F AMNT PAIN NOTED PAIN PRSNT: CPT | Performed by: SURGERY

## 2024-01-02 PROCEDURE — 99212 OFFICE O/P EST SF 10 MIN: CPT | Performed by: SURGERY

## 2024-01-02 PROCEDURE — 1036F TOBACCO NON-USER: CPT | Performed by: SURGERY

## 2024-01-02 PROCEDURE — 1159F MED LIST DOCD IN RCRD: CPT | Performed by: SURGERY

## 2024-01-02 NOTE — PROGRESS NOTES
Chief complaint/    Postop check        HPI/    Patient returns in follow-up.  Status post open repair of a epigastric hernia    Patient convalescing uneventfully.  Ambulating easily.  Denies any wound healing problems.    GI and  function at baseline        Physical exam/    Abdomen soft and nontender.  Wound is clean and dry without redness or drainage.    Assessment/    Doing well after repair of hernia        Plan/    Patient to continue to increase her level activity    RTC 4-6 weeks for final check

## 2024-01-04 ENCOUNTER — APPOINTMENT (OUTPATIENT)
Dept: SURGERY | Facility: CLINIC | Age: 74
End: 2024-01-04
Payer: MEDICARE

## 2024-01-10 ENCOUNTER — APPOINTMENT (OUTPATIENT)
Dept: UROLOGY | Facility: CLINIC | Age: 74
End: 2024-01-10
Payer: MEDICARE

## 2024-01-11 ENCOUNTER — TELEPHONE (OUTPATIENT)
Dept: SURGERY | Facility: CLINIC | Age: 74
End: 2024-01-11
Payer: MEDICARE

## 2024-01-11 NOTE — TELEPHONE ENCOUNTER
Pt called with concerns of left upper abdominal and rib pain.  Denies fever, nausea or vomiting.  Pt is moving her bowels normally,  pt describes the pain as sharp gas like pains.  Pt advised to call pcp for office visit to be seen and educated on what symptoms to be seen immediately for.  Pt agrees with plan of care and expresses verbal understanding.

## 2024-01-12 ENCOUNTER — TELEPHONE (OUTPATIENT)
Dept: SURGERY | Facility: CLINIC | Age: 74
End: 2024-01-12
Payer: MEDICARE

## 2024-01-12 NOTE — TELEPHONE ENCOUNTER
Pt called stating she could not get into see her pcp for her gas like pain and questioned going to [facility]. Pt states she did sleep well and the pain is not as bad today, still denies fever or vomiting, admits to moving bowels normally.  Pt states pain seems to be with certain movements.  Dr. Giraldo aware.  Office appt made to see Dr. Giraldo however if the pain worsens over the weekend or new symptoms develop she should go to ER not [facility].  Pt expresses verbal understanding and agrees with plan of care.

## 2024-01-15 ENCOUNTER — OFFICE VISIT (OUTPATIENT)
Dept: UROLOGY | Facility: CLINIC | Age: 74
End: 2024-01-15
Payer: MEDICARE

## 2024-01-15 ENCOUNTER — APPOINTMENT (OUTPATIENT)
Dept: UROLOGY | Facility: CLINIC | Age: 74
End: 2024-01-15
Payer: MEDICARE

## 2024-01-15 VITALS
SYSTOLIC BLOOD PRESSURE: 142 MMHG | HEART RATE: 93 BPM | HEIGHT: 65 IN | WEIGHT: 135 LBS | BODY MASS INDEX: 22.49 KG/M2 | TEMPERATURE: 96 F | DIASTOLIC BLOOD PRESSURE: 83 MMHG

## 2024-01-15 DIAGNOSIS — R33.9 URINARY RETENTION: Primary | ICD-10-CM

## 2024-01-15 LAB
POC APPEARANCE, URINE: CLEAR
POC BILIRUBIN, URINE: NEGATIVE
POC BLOOD, URINE: NEGATIVE
POC COLOR, URINE: YELLOW
POC GLUCOSE, URINE: NEGATIVE MG/DL
POC KETONES, URINE: ABNORMAL MG/DL
POC LEUKOCYTES, URINE: NEGATIVE
POC NITRITE,URINE: NEGATIVE
POC PH, URINE: 5.5 PH
POC PROTEIN, URINE: ABNORMAL MG/DL
POC SPECIFIC GRAVITY, URINE: 1.02
POC UROBILINOGEN, URINE: 0.2 EU/DL

## 2024-01-15 PROCEDURE — 81003 URINALYSIS AUTO W/O SCOPE: CPT | Performed by: NURSE PRACTITIONER

## 2024-01-15 PROCEDURE — 99213 OFFICE O/P EST LOW 20 MIN: CPT | Performed by: NURSE PRACTITIONER

## 2024-01-15 PROCEDURE — 1036F TOBACCO NON-USER: CPT | Performed by: NURSE PRACTITIONER

## 2024-01-15 PROCEDURE — 1111F DSCHRG MED/CURRENT MED MERGE: CPT | Performed by: NURSE PRACTITIONER

## 2024-01-15 PROCEDURE — 1159F MED LIST DOCD IN RCRD: CPT | Performed by: NURSE PRACTITIONER

## 2024-01-15 PROCEDURE — 1125F AMNT PAIN NOTED PAIN PRSNT: CPT | Performed by: NURSE PRACTITIONER

## 2024-01-15 PROCEDURE — 51798 US URINE CAPACITY MEASURE: CPT | Performed by: NURSE PRACTITIONER

## 2024-01-15 RX ORDER — NAPROXEN 500 MG/1
500 TABLET, DELAYED RELEASE ORAL EVERY 12 HOURS PRN
COMMUNITY
Start: 2023-11-22 | End: 2024-01-15 | Stop reason: SDUPTHER

## 2024-01-15 NOTE — PATIENT INSTRUCTIONS
Continue  prevention, Dmanose  Follow up 6 mos PVR, pelvic exam, eval prolapse  Nurse line [phone] if any issues sooner

## 2024-01-15 NOTE — PROGRESS NOTES
"01/15/24   [id number]    PVR follow up urinary retention     Subjective      HPI Demetria Enriquez is a 73 y.o. female who presents for follow up recent urinary retention, PVR; passed TOV on 11/15/23; Recent urinary retention developed w fall, back issues and UTI;     Since last visit surgery for strangulated ventral hernia; doing better, rough severa months since fall;     PVR 0 ml, UA neg today, denies urinary urgency, frequency or leakage of urine; no UTIs, using Dmanose, unable to use estrogen cream, causes itching;     Some fecal leakage, trying to keep clean, already taking metamucil, considering colonoscopy as recommended by Dr. Giraldo.         Objective     /83   Pulse 93   Temp 35.6 °C (96 °F)   Ht 1.651 m (5' 5\")   Wt 61.2 kg (135 lb)   BMI 22.47 kg/m²    Physical Exam  General: Appears comfortable and in no apparent distress, well nourished  Head: Normocephalic, atraumatic  Neck: trachea midline  Respiratory: respirations unlabored, no wheezes, and no use of accessory muscles  Cardiovascular: at rest no dyspnea, well perfused  Skin: no visible rashes or lesions  Neurologic: grossly intact, oriented to person, place, and time  Psychiatric: mood and affect appropriate  Musculoskeletal: in chair for appt. no difficulty w upper body movement      Assessment/Plan   Problem List Items Addressed This Visit          Genitourinary and Reproductive    Urinary retention - Primary    Relevant Orders    POCT UA Automated manually resulted (Completed)    Post-Void Residual (Completed)     Orders Placed This Encounter   Procedures    Post-Void Residual    POCT UA Automated manually resulted     Order Specific Question:   Release result to [facility]     Answer:   Immediate [1]         Continue  prevention, Dmanose  Follow up 6 mos PVR, pelvic exam, eval prolapse  Nurse line [phone] if any issues sooner    Tuyet Wahl, APRN-CNP  Lab Results   Component Value Date    GLUCOSE 96 12/21/2023    CALCIUM 8.8 " 12/21/2023     (L) 12/21/2023    K 3.5 12/21/2023    CO2 23 12/21/2023     12/21/2023    BUN 7 12/21/2023    CREATININE 0.47 (L) 12/21/2023

## 2024-01-16 ENCOUNTER — OFFICE VISIT (OUTPATIENT)
Dept: SURGERY | Facility: CLINIC | Age: 74
End: 2024-01-16
Payer: MEDICARE

## 2024-01-16 VITALS
HEART RATE: 87 BPM | TEMPERATURE: 97.2 F | SYSTOLIC BLOOD PRESSURE: 138 MMHG | DIASTOLIC BLOOD PRESSURE: 84 MMHG | OXYGEN SATURATION: 99 % | RESPIRATION RATE: 16 BRPM

## 2024-01-16 DIAGNOSIS — K43.9 VENTRAL HERNIA WITHOUT OBSTRUCTION OR GANGRENE: Primary | ICD-10-CM

## 2024-01-16 PROCEDURE — 1036F TOBACCO NON-USER: CPT | Performed by: SURGERY

## 2024-01-16 PROCEDURE — 1125F AMNT PAIN NOTED PAIN PRSNT: CPT | Performed by: SURGERY

## 2024-01-16 PROCEDURE — 1159F MED LIST DOCD IN RCRD: CPT | Performed by: SURGERY

## 2024-01-16 PROCEDURE — 99212 OFFICE O/P EST SF 10 MIN: CPT | Performed by: SURGERY

## 2024-01-16 PROCEDURE — 1111F DSCHRG MED/CURRENT MED MERGE: CPT | Performed by: SURGERY

## 2024-01-16 NOTE — PROGRESS NOTES
Chief complaint/    Pain in the left rib cage and left back        HPI/    Patient now for weeks status post emergent repair of incarcerated midline hernia.  Patient says she has had no wound healing problems.  She denies any redness or drainage from the wound.  She denies any pain in the wound.    Patient is eating well.  She has her baseline constipation.    Patient is a new issue.  She complains of pain along the left costal margin extending towards the left flank.  Denies any hematuria or dysuria.  Cannot relate the pain to eating        Physical exam/    Abdomen soft and nontender.  The midline wound is clean and dry without redness or drainage    Patient is examined both lying down and standing.  The midline is solid        Assessment/    Doing well after repair of incarcerated hernia        Plan/    I do not have good explanation for the patient's discomfort.  I think this is not related to the recent surgery.  Again the midline wound is clean, dry, solid.    Note made of CT scan carried out when patient presented with incarcerated hernia.  No evidence of left upper quadrant pathology in the abdomen noted at that time

## 2024-01-22 ENCOUNTER — TELEPHONE (OUTPATIENT)
Dept: UROLOGY | Facility: CLINIC | Age: 74
End: 2024-01-22
Payer: MEDICARE

## 2024-01-22 NOTE — TELEPHONE ENCOUNTER
Pt left message stating that the colorectal surgeon you referred her to, [place] travel that far. She is asking if you know another surgeon closer. If not, she will just go by what her insurance tells her. Please advise, thanks. Please advise, thanks.

## 2024-01-30 ENCOUNTER — APPOINTMENT (OUTPATIENT)
Dept: SURGERY | Facility: CLINIC | Age: 74
End: 2024-01-30
Payer: MEDICARE

## 2024-02-01 ENCOUNTER — APPOINTMENT (OUTPATIENT)
Dept: SURGERY | Facility: CLINIC | Age: 74
End: 2024-02-01
Payer: MEDICARE

## 2024-02-06 ENCOUNTER — APPOINTMENT (OUTPATIENT)
Dept: SURGERY | Facility: CLINIC | Age: 74
End: 2024-02-06
Payer: MEDICARE

## 2024-02-22 ENCOUNTER — OFFICE VISIT (OUTPATIENT)
Dept: GASTROENTEROLOGY | Facility: CLINIC | Age: 74
End: 2024-02-22
Payer: MEDICARE

## 2024-02-22 ENCOUNTER — TELEPHONE (OUTPATIENT)
Dept: GASTROENTEROLOGY | Facility: CLINIC | Age: 74
End: 2024-02-22

## 2024-02-22 ENCOUNTER — HOSPITAL ENCOUNTER (INPATIENT)
Facility: HOSPITAL | Age: 74
LOS: 3 days | Discharge: HOME | DRG: 683 | End: 2024-02-25
Attending: STUDENT IN AN ORGANIZED HEALTH CARE EDUCATION/TRAINING PROGRAM | Admitting: FAMILY MEDICINE
Payer: MEDICARE

## 2024-02-22 ENCOUNTER — APPOINTMENT (OUTPATIENT)
Dept: RADIOLOGY | Facility: HOSPITAL | Age: 74
DRG: 683 | End: 2024-02-22
Payer: MEDICARE

## 2024-02-22 ENCOUNTER — LAB (OUTPATIENT)
Dept: LAB | Facility: LAB | Age: 74
End: 2024-02-22
Payer: MEDICARE

## 2024-02-22 VITALS
SYSTOLIC BLOOD PRESSURE: 113 MMHG | HEIGHT: 64 IN | BODY MASS INDEX: 22.88 KG/M2 | HEART RATE: 80 BPM | DIASTOLIC BLOOD PRESSURE: 81 MMHG | WEIGHT: 134 LBS

## 2024-02-22 DIAGNOSIS — R19.7 DIARRHEA, UNSPECIFIED TYPE: Primary | ICD-10-CM

## 2024-02-22 DIAGNOSIS — R19.7 DIARRHEA, UNSPECIFIED TYPE: ICD-10-CM

## 2024-02-22 DIAGNOSIS — N17.9 ACUTE RENAL FAILURE, UNSPECIFIED ACUTE RENAL FAILURE TYPE (CMS-HCC): Primary | ICD-10-CM

## 2024-02-22 DIAGNOSIS — R68.81 EARLY SATIETY: ICD-10-CM

## 2024-02-22 DIAGNOSIS — R33.9 URINARY RETENTION: ICD-10-CM

## 2024-02-22 DIAGNOSIS — R14.2 BELCHING: ICD-10-CM

## 2024-02-22 DIAGNOSIS — R11.0 NAUSEA: ICD-10-CM

## 2024-02-22 LAB
ALBUMIN SERPL BCP-MCNC: 4.1 G/DL (ref 3.4–5)
ALP SERPL-CCNC: 95 U/L (ref 33–136)
ALT SERPL W P-5'-P-CCNC: 7 U/L (ref 7–45)
ANION GAP SERPL CALC-SCNC: 15 MMOL/L (ref 10–20)
APPEARANCE UR: ABNORMAL
AST SERPL W P-5'-P-CCNC: 16 U/L (ref 9–39)
BACTERIA #/AREA URNS AUTO: ABNORMAL /HPF
BILIRUB SERPL-MCNC: 0.3 MG/DL (ref 0–1.2)
BILIRUB UR STRIP.AUTO-MCNC: NEGATIVE MG/DL
BUN SERPL-MCNC: 52 MG/DL (ref 6–23)
CALCIUM SERPL-MCNC: 10.6 MG/DL (ref 8.6–10.3)
CHLORIDE SERPL-SCNC: 98 MMOL/L (ref 98–107)
CO2 SERPL-SCNC: 21 MMOL/L (ref 21–32)
COLOR UR: YELLOW
CREAT SERPL-MCNC: 2.98 MG/DL (ref 0.5–1.05)
CRP SERPL-MCNC: 1.88 MG/DL
EGFRCR SERPLBLD CKD-EPI 2021: 16 ML/MIN/1.73M*2
ERYTHROCYTE [DISTWIDTH] IN BLOOD BY AUTOMATED COUNT: 13.8 % (ref 11.5–14.5)
ERYTHROCYTE [SEDIMENTATION RATE] IN BLOOD BY WESTERGREN METHOD: 60 MM/H (ref 0–30)
FLUAV RNA RESP QL NAA+PROBE: NOT DETECTED
FLUBV RNA RESP QL NAA+PROBE: NOT DETECTED
GLUCOSE SERPL-MCNC: 101 MG/DL (ref 74–99)
GLUCOSE UR STRIP.AUTO-MCNC: NEGATIVE MG/DL
HCT VFR BLD AUTO: 32.5 % (ref 36–46)
HGB BLD-MCNC: 10.7 G/DL (ref 12–16)
HYALINE CASTS #/AREA URNS AUTO: ABNORMAL /LPF
KETONES UR STRIP.AUTO-MCNC: NEGATIVE MG/DL
LEUKOCYTE ESTERASE UR QL STRIP.AUTO: ABNORMAL
MAGNESIUM SERPL-MCNC: 2.26 MG/DL (ref 1.6–2.4)
MCH RBC QN AUTO: 28.2 PG (ref 26–34)
MCHC RBC AUTO-ENTMCNC: 32.9 G/DL (ref 32–36)
MCV RBC AUTO: 86 FL (ref 80–100)
NITRITE UR QL STRIP.AUTO: NEGATIVE
NRBC BLD-RTO: 0 /100 WBCS (ref 0–0)
PH UR STRIP.AUTO: 5 [PH]
PLATELET # BLD AUTO: 261 X10*3/UL (ref 150–450)
POTASSIUM SERPL-SCNC: 3.8 MMOL/L (ref 3.5–5.3)
PROT SERPL-MCNC: 7.2 G/DL (ref 6.4–8.2)
PROT UR STRIP.AUTO-MCNC: ABNORMAL MG/DL
RBC # BLD AUTO: 3.79 X10*6/UL (ref 4–5.2)
RBC # UR STRIP.AUTO: NEGATIVE /UL
RBC #/AREA URNS AUTO: ABNORMAL /HPF
SARS-COV-2 RNA RESP QL NAA+PROBE: NOT DETECTED
SODIUM SERPL-SCNC: 130 MMOL/L (ref 136–145)
SP GR UR STRIP.AUTO: 1.02
SQUAMOUS #/AREA URNS AUTO: ABNORMAL /HPF
UROBILINOGEN UR STRIP.AUTO-MCNC: <2 MG/DL
WBC # BLD AUTO: 7.4 X10*3/UL (ref 4.4–11.3)
WBC #/AREA URNS AUTO: ABNORMAL /HPF

## 2024-02-22 PROCEDURE — 1200000002 HC GENERAL ROOM WITH TELEMETRY DAILY

## 2024-02-22 PROCEDURE — 87636 SARSCOV2 & INF A&B AMP PRB: CPT | Performed by: PHYSICIAN ASSISTANT

## 2024-02-22 PROCEDURE — 2500000001 HC RX 250 WO HCPCS SELF ADMINISTERED DRUGS (ALT 637 FOR MEDICARE OP): Performed by: PHYSICIAN ASSISTANT

## 2024-02-22 PROCEDURE — 83735 ASSAY OF MAGNESIUM: CPT

## 2024-02-22 PROCEDURE — 96360 HYDRATION IV INFUSION INIT: CPT

## 2024-02-22 PROCEDURE — 2500000002 HC RX 250 W HCPCS SELF ADMINISTERED DRUGS (ALT 637 FOR MEDICARE OP, ALT 636 FOR OP/ED): Performed by: PHYSICIAN ASSISTANT

## 2024-02-22 PROCEDURE — 76770 US EXAM ABDO BACK WALL COMP: CPT | Mod: FOREIGN READ | Performed by: RADIOLOGY

## 2024-02-22 PROCEDURE — 99285 EMERGENCY DEPT VISIT HI MDM: CPT | Performed by: STUDENT IN AN ORGANIZED HEALTH CARE EDUCATION/TRAINING PROGRAM

## 2024-02-22 PROCEDURE — 99285 EMERGENCY DEPT VISIT HI MDM: CPT | Mod: 25

## 2024-02-22 PROCEDURE — 1159F MED LIST DOCD IN RCRD: CPT | Performed by: NURSE PRACTITIONER

## 2024-02-22 PROCEDURE — 86140 C-REACTIVE PROTEIN: CPT

## 2024-02-22 PROCEDURE — 85652 RBC SED RATE AUTOMATED: CPT

## 2024-02-22 PROCEDURE — 85027 COMPLETE CBC AUTOMATED: CPT

## 2024-02-22 PROCEDURE — 1125F AMNT PAIN NOTED PAIN PRSNT: CPT | Performed by: NURSE PRACTITIONER

## 2024-02-22 PROCEDURE — 2500000004 HC RX 250 GENERAL PHARMACY W/ HCPCS (ALT 636 FOR OP/ED): Performed by: PHYSICIAN ASSISTANT

## 2024-02-22 PROCEDURE — 76770 US EXAM ABDO BACK WALL COMP: CPT

## 2024-02-22 PROCEDURE — 80053 COMPREHEN METABOLIC PANEL: CPT

## 2024-02-22 PROCEDURE — 2500000004 HC RX 250 GENERAL PHARMACY W/ HCPCS (ALT 636 FOR OP/ED): Performed by: INTERNAL MEDICINE

## 2024-02-22 PROCEDURE — 2500000004 HC RX 250 GENERAL PHARMACY W/ HCPCS (ALT 636 FOR OP/ED): Performed by: STUDENT IN AN ORGANIZED HEALTH CARE EDUCATION/TRAINING PROGRAM

## 2024-02-22 PROCEDURE — 1036F TOBACCO NON-USER: CPT | Performed by: NURSE PRACTITIONER

## 2024-02-22 PROCEDURE — 99213 OFFICE O/P EST LOW 20 MIN: CPT | Performed by: NURSE PRACTITIONER

## 2024-02-22 PROCEDURE — 87493 C DIFF AMPLIFIED PROBE: CPT | Mod: STJLAB | Performed by: STUDENT IN AN ORGANIZED HEALTH CARE EDUCATION/TRAINING PROGRAM

## 2024-02-22 PROCEDURE — 36415 COLL VENOUS BLD VENIPUNCTURE: CPT

## 2024-02-22 PROCEDURE — 81001 URINALYSIS AUTO W/SCOPE: CPT | Performed by: STUDENT IN AN ORGANIZED HEALTH CARE EDUCATION/TRAINING PROGRAM

## 2024-02-22 PROCEDURE — 99222 1ST HOSP IP/OBS MODERATE 55: CPT | Performed by: PHYSICIAN ASSISTANT

## 2024-02-22 RX ORDER — POLYETHYLENE GLYCOL 3350 17 G/17G
17 POWDER, FOR SOLUTION ORAL DAILY
Status: DISCONTINUED | OUTPATIENT
Start: 2024-02-22 | End: 2024-02-25 | Stop reason: HOSPADM

## 2024-02-22 RX ORDER — PANTOPRAZOLE SODIUM 40 MG/1
40 TABLET, DELAYED RELEASE ORAL
Status: DISCONTINUED | OUTPATIENT
Start: 2024-02-23 | End: 2024-02-25 | Stop reason: HOSPADM

## 2024-02-22 RX ORDER — ONDANSETRON HYDROCHLORIDE 2 MG/ML
4 INJECTION, SOLUTION INTRAVENOUS EVERY 8 HOURS PRN
Status: DISCONTINUED | OUTPATIENT
Start: 2024-02-22 | End: 2024-02-25 | Stop reason: HOSPADM

## 2024-02-22 RX ORDER — HYDROXYZINE HYDROCHLORIDE 50 MG/1
50 TABLET, FILM COATED ORAL NIGHTLY
Status: DISCONTINUED | OUTPATIENT
Start: 2024-02-22 | End: 2024-02-25 | Stop reason: HOSPADM

## 2024-02-22 RX ORDER — ALPRAZOLAM 0.25 MG/1
0.25 TABLET ORAL DAILY PRN
Status: DISCONTINUED | OUTPATIENT
Start: 2024-02-22 | End: 2024-02-25 | Stop reason: HOSPADM

## 2024-02-22 RX ORDER — PROPRANOLOL HYDROCHLORIDE 60 MG/1
60 CAPSULE, EXTENDED RELEASE ORAL DAILY
Status: DISCONTINUED | OUTPATIENT
Start: 2024-02-22 | End: 2024-02-25 | Stop reason: HOSPADM

## 2024-02-22 RX ORDER — HYDROXYZINE HYDROCHLORIDE 25 MG/1
50 TABLET, FILM COATED ORAL NIGHTLY
COMMUNITY

## 2024-02-22 RX ORDER — CITALOPRAM 20 MG/1
20 TABLET, FILM COATED ORAL DAILY
Status: DISCONTINUED | OUTPATIENT
Start: 2024-02-22 | End: 2024-02-25 | Stop reason: HOSPADM

## 2024-02-22 RX ORDER — SIMVASTATIN 20 MG/1
20 TABLET, FILM COATED ORAL NIGHTLY
Status: DISCONTINUED | OUTPATIENT
Start: 2024-02-22 | End: 2024-02-25 | Stop reason: HOSPADM

## 2024-02-22 RX ORDER — SODIUM CHLORIDE, SODIUM LACTATE, POTASSIUM CHLORIDE, CALCIUM CHLORIDE 600; 310; 30; 20 MG/100ML; MG/100ML; MG/100ML; MG/100ML
100 INJECTION, SOLUTION INTRAVENOUS CONTINUOUS
Status: DISCONTINUED | OUTPATIENT
Start: 2024-02-22 | End: 2024-02-25 | Stop reason: HOSPADM

## 2024-02-22 RX ORDER — LANOLIN ALCOHOL/MO/W.PET/CERES
400 CREAM (GRAM) TOPICAL DAILY
Status: DISCONTINUED | OUTPATIENT
Start: 2024-02-22 | End: 2024-02-25 | Stop reason: HOSPADM

## 2024-02-22 RX ORDER — TALC
6 POWDER (GRAM) TOPICAL NIGHTLY
Status: DISCONTINUED | OUTPATIENT
Start: 2024-02-22 | End: 2024-02-25 | Stop reason: HOSPADM

## 2024-02-22 RX ORDER — LEVOTHYROXINE SODIUM 50 UG/1
50 TABLET ORAL
Status: DISCONTINUED | OUTPATIENT
Start: 2024-02-22 | End: 2024-02-25 | Stop reason: HOSPADM

## 2024-02-22 RX ORDER — PROPRANOLOL HYDROCHLORIDE 60 MG/1
60 CAPSULE, EXTENDED RELEASE ORAL DAILY
COMMUNITY

## 2024-02-22 RX ORDER — BUSPIRONE HYDROCHLORIDE 10 MG/1
10 TABLET ORAL 2 TIMES DAILY
Status: DISCONTINUED | OUTPATIENT
Start: 2024-02-22 | End: 2024-02-25 | Stop reason: HOSPADM

## 2024-02-22 RX ORDER — TAMSULOSIN HYDROCHLORIDE 0.4 MG/1
0.8 CAPSULE ORAL NIGHTLY
Status: DISCONTINUED | OUTPATIENT
Start: 2024-02-22 | End: 2024-02-25 | Stop reason: HOSPADM

## 2024-02-22 RX ORDER — ONDANSETRON 4 MG/1
4 TABLET, ORALLY DISINTEGRATING ORAL EVERY 8 HOURS PRN
Status: DISCONTINUED | OUTPATIENT
Start: 2024-02-22 | End: 2024-02-25 | Stop reason: HOSPADM

## 2024-02-22 RX ORDER — SODIUM CHLORIDE 9 MG/ML
75 INJECTION, SOLUTION INTRAVENOUS CONTINUOUS
Status: DISCONTINUED | OUTPATIENT
Start: 2024-02-22 | End: 2024-02-22

## 2024-02-22 RX ORDER — VITAMIN E MIXED 400 UNIT
400 CAPSULE ORAL DAILY
COMMUNITY

## 2024-02-22 RX ORDER — ALPRAZOLAM 0.25 MG/1
0.25 TABLET ORAL DAILY PRN
COMMUNITY

## 2024-02-22 RX ADMIN — HYDROXYZINE HYDROCHLORIDE 50 MG: 50 TABLET, FILM COATED ORAL at 21:09

## 2024-02-22 RX ADMIN — BUSPIRONE HYDROCHLORIDE 10 MG: 10 TABLET ORAL at 21:09

## 2024-02-22 RX ADMIN — SIMVASTATIN 20 MG: 20 TABLET, FILM COATED ORAL at 21:09

## 2024-02-22 RX ADMIN — SODIUM CHLORIDE, POTASSIUM CHLORIDE, SODIUM LACTATE AND CALCIUM CHLORIDE 100 ML/HR: 600; 310; 30; 20 INJECTION, SOLUTION INTRAVENOUS at 21:12

## 2024-02-22 RX ADMIN — SODIUM CHLORIDE 75 ML/HR: 9 INJECTION, SOLUTION INTRAVENOUS at 13:46

## 2024-02-22 RX ADMIN — Medication 6 MG: at 21:09

## 2024-02-22 RX ADMIN — TAMSULOSIN HYDROCHLORIDE 0.8 MG: 0.4 CAPSULE ORAL at 21:09

## 2024-02-22 RX ADMIN — SODIUM CHLORIDE, POTASSIUM CHLORIDE, SODIUM LACTATE AND CALCIUM CHLORIDE 1000 ML: 600; 310; 30; 20 INJECTION, SOLUTION INTRAVENOUS at 13:15

## 2024-02-22 SDOH — SOCIAL STABILITY: SOCIAL INSECURITY: HAVE YOU HAD THOUGHTS OF HARMING ANYONE ELSE?: NO

## 2024-02-22 SDOH — SOCIAL STABILITY: SOCIAL INSECURITY: DO YOU FEEL ANYONE HAS EXPLOITED OR TAKEN ADVANTAGE OF YOU FINANCIALLY OR OF YOUR PERSONAL PROPERTY?: NO

## 2024-02-22 SDOH — SOCIAL STABILITY: SOCIAL INSECURITY: WERE YOU ABLE TO COMPLETE ALL THE BEHAVIORAL HEALTH SCREENINGS?: YES

## 2024-02-22 SDOH — SOCIAL STABILITY: SOCIAL INSECURITY: HAS ANYONE EVER THREATENED TO HURT YOUR FAMILY OR YOUR PETS?: NO

## 2024-02-22 SDOH — SOCIAL STABILITY: SOCIAL INSECURITY: DOES ANYONE TRY TO KEEP YOU FROM HAVING/CONTACTING OTHER FRIENDS OR DOING THINGS OUTSIDE YOUR HOME?: NO

## 2024-02-22 SDOH — SOCIAL STABILITY: SOCIAL INSECURITY: ARE THERE ANY APPARENT SIGNS OF INJURIES/BEHAVIORS THAT COULD BE RELATED TO ABUSE/NEGLECT?: NO

## 2024-02-22 SDOH — SOCIAL STABILITY: SOCIAL INSECURITY: ARE YOU OR HAVE YOU BEEN THREATENED OR ABUSED PHYSICALLY, EMOTIONALLY, OR SEXUALLY BY ANYONE?: NO

## 2024-02-22 SDOH — SOCIAL STABILITY: SOCIAL INSECURITY: DO YOU FEEL UNSAFE GOING BACK TO THE PLACE WHERE YOU ARE LIVING?: NO

## 2024-02-22 SDOH — SOCIAL STABILITY: SOCIAL INSECURITY: ABUSE: ADULT

## 2024-02-22 ASSESSMENT — COGNITIVE AND FUNCTIONAL STATUS - GENERAL
TURNING FROM BACK TO SIDE WHILE IN FLAT BAD: A LITTLE
MOVING TO AND FROM BED TO CHAIR: A LITTLE
DRESSING REGULAR LOWER BODY CLOTHING: A LITTLE
CLIMB 3 TO 5 STEPS WITH RAILING: A LOT
MOVING FROM LYING ON BACK TO SITTING ON SIDE OF FLAT BED WITH BEDRAILS: A LITTLE
TOILETING: A LITTLE
DAILY ACTIVITIY SCORE: 18
CLIMB 3 TO 5 STEPS WITH RAILING: A LITTLE
DAILY ACTIVITIY SCORE: 24
PATIENT BASELINE BEDBOUND: NO
WALKING IN HOSPITAL ROOM: A LITTLE
MOBILITY SCORE: 23
MOBILITY SCORE: 17
PERSONAL GROOMING: A LITTLE
STANDING UP FROM CHAIR USING ARMS: A LITTLE
HELP NEEDED FOR BATHING: A LITTLE
DRESSING REGULAR UPPER BODY CLOTHING: A LITTLE
EATING MEALS: A LITTLE

## 2024-02-22 ASSESSMENT — PATIENT HEALTH QUESTIONNAIRE - PHQ9
SUM OF ALL RESPONSES TO PHQ9 QUESTIONS 1 & 2: 0
1. LITTLE INTEREST OR PLEASURE IN DOING THINGS: NOT AT ALL
2. FEELING DOWN, DEPRESSED OR HOPELESS: NOT AT ALL

## 2024-02-22 ASSESSMENT — ACTIVITIES OF DAILY LIVING (ADL)
ADEQUATE_TO_COMPLETE_ADL: YES
FEEDING YOURSELF: INDEPENDENT
DRESSING YOURSELF: INDEPENDENT
BATHING: INDEPENDENT
PATIENT'S MEMORY ADEQUATE TO SAFELY COMPLETE DAILY ACTIVITIES?: YES
TOILETING: INDEPENDENT
LACK_OF_TRANSPORTATION: NO
GROOMING: INDEPENDENT
WALKS IN HOME: INDEPENDENT
HEARING - LEFT EAR: FUNCTIONAL
JUDGMENT_ADEQUATE_SAFELY_COMPLETE_DAILY_ACTIVITIES: YES
HEARING - RIGHT EAR: FUNCTIONAL

## 2024-02-22 ASSESSMENT — ENCOUNTER SYMPTOMS
BLOOD IN STOOL: 0
CONSTIPATION: 0
DIZZINESS: 0
FEVER: 0
ABDOMINAL PAIN: 0
CONFUSION: 0
HEADACHES: 0
WOUND: 0
SORE THROAT: 0
LIGHT-HEADEDNESS: 0
HEMATURIA: 0
NAUSEA: 1
PALPITATIONS: 0
FREQUENCY: 0
DIARRHEA: 1
SHORTNESS OF BREATH: 0
FATIGUE: 0
COUGH: 0
ABDOMINAL DISTENTION: 0
BACK PAIN: 0
CHILLS: 0
WEAKNESS: 1
DYSURIA: 0
VOMITING: 0

## 2024-02-22 ASSESSMENT — PAIN - FUNCTIONAL ASSESSMENT
PAIN_FUNCTIONAL_ASSESSMENT: 0-10

## 2024-02-22 ASSESSMENT — LIFESTYLE VARIABLES
HOW OFTEN DO YOU HAVE A DRINK CONTAINING ALCOHOL: NEVER
HOW MANY STANDARD DRINKS CONTAINING ALCOHOL DO YOU HAVE ON A TYPICAL DAY: PATIENT DOES NOT DRINK
EVER FELT BAD OR GUILTY ABOUT YOUR DRINKING: NO
HOW OFTEN DO YOU HAVE 6 OR MORE DRINKS ON ONE OCCASION: NEVER
AUDIT-C TOTAL SCORE: 0
SKIP TO QUESTIONS 9-10: 1
HAVE YOU EVER FELT YOU SHOULD CUT DOWN ON YOUR DRINKING: NO
HAVE PEOPLE ANNOYED YOU BY CRITICIZING YOUR DRINKING: NO
EVER HAD A DRINK FIRST THING IN THE MORNING TO STEADY YOUR NERVES TO GET RID OF A HANGOVER: NO
AUDIT-C TOTAL SCORE: 0

## 2024-02-22 ASSESSMENT — COLUMBIA-SUICIDE SEVERITY RATING SCALE - C-SSRS
1. IN THE PAST MONTH, HAVE YOU WISHED YOU WERE DEAD OR WISHED YOU COULD GO TO SLEEP AND NOT WAKE UP?: NO
6. HAVE YOU EVER DONE ANYTHING, STARTED TO DO ANYTHING, OR PREPARED TO DO ANYTHING TO END YOUR LIFE?: NO
2. HAVE YOU ACTUALLY HAD ANY THOUGHTS OF KILLING YOURSELF?: NO

## 2024-02-22 ASSESSMENT — PAIN SCALES - GENERAL
PAINLEVEL_OUTOF10: 0 - NO PAIN

## 2024-02-22 NOTE — CONSULTS
INPATIENT NEPHROLOGY CONSULT NOTE        CONSULT: Nephrology SERVICE    PATIENT NAME: Demetria Enriquez    MRN: [id number]  DATE of SERVICE: February 22, 2024  TIME of SERVICE: 4:04 PM      REASON FOR CONSULT: SONIA, hyponatremia   REQUESTING PHYSICIAN: Dr. Morris   PRIMARY CARE PHYSICIAN: Ophelia Morris MD    HPI:  Ms. Enriquez is a 73 y.o. female who presents for evaluation of diarrhea.    Patient with past medical history significant for relatively preserved kidney function, hypothyroidism, migraine headache, depression, arthritis, hyperlipidemia.     Who presented to [facility] February 22, 2024 for evaluation of diarrhea.  Patient reports that diarrhea started February 13, 2024 about 3-4 bowel movements a day diarrhea associated with sharp lower left abdominal pain.  Resolved 2 days later and then resumed with concomitant nausea and poor oral intake.  Patient gradually developed weakness, dehydration and low blood pressure increased hide labs and burping.  She was initially taking Imodium but then was recommended to stop by outpatient gastroenterologist.  She has been taking NSAIDs 500 mg naproxen 1-2 times a day since September..  Seen and evaluated at bedside patient reports improvement in bowel movement since presentation.    Labs demonstrated acute kidney injury with serum creatinine up to 2.89 mg deciliter from baseline creatinine of 0.5 mg/dL, GFR of 69 mill per minute from baseline GFR more than 90.  Azotemia with BUN of 52, hyponatremia sodium of 130 hypercalcemia calcium 10.6    Kidney ultrasound without hydronephrosis.  Right kidney cyst noted.  Patient had a CT scan #17 2020 hepatic lobe cyst stable defect in the midline abdominal wall 4.9 x 2.2 cm and 6 x 2.1 cm fat-containing anterior abdominal hernias.  Moderate to large amount of stool throughout the colon also evidence of fecalization of the small bowel loop  Fracture of  the right superior pubic ramus subacute fracture of right inferior pubic ramus.  Bilateral subacute kidney injury with sacral kelly fracture with sclerosis of the sacrum.      ASSESSMENT AND PLAN:  Acute kidney injury in the setting of profound diarrhea for 2 weeks:  -- SONIA likely prerenal due to dehydration, NSAIDs related, analgesic nephropathy  --Admission serum creatinine to 2.9 mg deciliter with drop in GFR to 16 ml  --No uremic symptoms  -- No signs of obstruction on renal ultrasound    Diarrhea:  Concerning for overflow incontinent  Recent CT scan December 17 demonstrated severe constipation  Infectious workup in process    Right kidney cyst appears to be benign    Volume: Dehydration, hypovolemia    Electrolytes: Hyponatremia likely hypovolemic    Proteinuria detected on urinalysis to quantify protein to creatinine ratio    Hypercalcemia likely secondary to dehydration    Associated conditions: Patient with history of urinary retention, anxiety, migraine headache, GERD, hypothyroidism.    Plan:  Acute kidney injury in the setting of acute diarrhea likely prerenal secondary to dehydration,   Send images demonstrated severe constipation which raises a concern for diarrhea related to overflow; workup for infectious disease in process.     To switch IV fluid to  mL/h and adjust based on insensible losses.  To Continue to avoid NSAIDs.    Unclear why patient has pubic ramus and sacral fractures, anemia, hypercalcemia: To rule out plasma cell disorder.    To quantify protein to creatinine ratio NSAIDs causes FSGS and minimal-change disease    Continue to replace potassium and magnesium in the setting of GI loss    Medication reviewed, adjusted we will continue to monitor closely with you, thank you!    I sincerely, thank you Dr. Morris for this opportunity to participate in the care of your patient, I will follow from Nephrology point view!    PAST MEDICAL HISTORY:    Past Medical History:   Diagnosis Date     Anxiety     Arthritis     Depression     Drusen (degenerative) of macula, unspecified eye 02/19/2015    Macular drusen    GERD (gastroesophageal reflux disease)     Hyperlipidemia     Hypothyroid     Migraine        PAST SURGICAL HISTORY:    Past Surgical History:   Procedure Laterality Date    HERNIA REPAIR      OTHER SURGICAL HISTORY  03/06/2020    Rectocele and cystocele repair       FAMILY HISTORY:    Family History   Problem Relation Name Age of Onset    Dementia Mother      Heart attack Father         SOCIAL HISTORY:    Social History     Tobacco Use    Smoking status: Never    Smokeless tobacco: Never   Vaping Use    Vaping Use: Never used   Substance Use Topics    Alcohol use: Not Currently    Drug use: Never       MEDICATIONS:  Prior to Admission Medications:  Medications Prior to Admission   Medication Sig Dispense Refill Last Dose    ALPRAZolam (Xanax) 0.25 mg tablet Take 1 tablet (0.25 mg) by mouth once daily as needed for anxiety.   Past Week    busPIRone (Buspar) 10 mg tablet Take 1 tablet (10 mg) by mouth 2 times a day.   2/22/2024 at am    calcium carbonate 600 mg calcium (1,500 mg) tablet Take 1,200 mg by mouth once daily.   2/22/2024    cholecalciferol (Vitamin D-3) 25 MCG (1000 UT) tablet Take 1 tablet (25 mcg) by mouth once daily.   2/22/2024 at am    citalopram (CeleXA) 20 mg tablet Take 1 tablet (20 mg) by mouth once daily.   2/22/2024 at am    docusate sodium (Colace) 100 mg capsule Take 2 capsules (200 mg) by mouth once daily at bedtime.   Past Week    hydrOXYzine HCL (Atarax) 25 mg tablet Take 2 tablets (50 mg) by mouth once daily at bedtime.   2/21/2024    levothyroxine (Synthroid, Levoxyl) 50 mcg tablet Take 1 tablet (50 mcg) by mouth once daily.   2/22/2024 at am    magnesium oxide 500 mg tablet Take 1 tablet (500 mg) by mouth once daily.   2/22/2024 at am    melatonin 3 mg tablet Take 2 tablets (6 mg) by mouth once daily at bedtime.   2/21/2024    multivitamin with minerals tablet Take 1  tablet by mouth once daily.   2/22/2024 at am    naproxen (Naprosyn) 500 mg tablet Take 1 tablet (500 mg) by mouth 2 times a day with meals.   2/22/2024 at am    omeprazole (PriLOSEC) 40 mg DR capsule Take 1 capsule (40 mg) by mouth once daily.   2/22/2024 at am    polyethylene glycol (Glycolax, Miralax) 17 gram packet Take 17 g by mouth once daily as needed (constipation).   Past Month    propranolol LA (Inderal LA) 60 mg 24 hr capsule Take 1 capsule (60 mg) by mouth once daily.   2/22/2024 at am    psyllium (Metamucil) 3.4 gram packet Take 1 packet by mouth once daily as needed (constipation).   Past Month    simvastatin (Zocor) 20 mg tablet Take 1 tablet (20 mg) by mouth once daily at bedtime.   2/21/2024    SUMAtriptan (Imitrex) 100 mg tablet Take 1 tablet (100 mg) by mouth once daily as needed for migraine. MAY REPEAT ONCE IN 2 HOURS FOR 30 DAYS   Past Month    tamsulosin (Flomax) 0.4 mg 24 hr capsule Take 2 capsules (0.8 mg) by mouth once daily at bedtime. (Patient taking differently: Take 1 capsule (0.4 mg) by mouth once daily at bedtime.) 60 capsule 3 2/21/2024    vitamin E 180 mg (400 unit) capsule Take 1 capsule (400 Units) by mouth once daily.   2/22/2024 at am       INPATIENT MEDICATIONS:    Current Facility-Administered Medications:     ALPRAZolam (Xanax) tablet 0.25 mg, 0.25 mg, oral, Daily PRN, Nelli Finley PA-C    busPIRone (Buspar) tablet 10 mg, 10 mg, oral, BID, Nelli Finley PA-C    citalopram (CeleXA) tablet 20 mg, 20 mg, oral, Daily, Nelli Finley PA-C    hydrOXYzine HCL (Atarax) tablet 50 mg, 50 mg, oral, Nightly, Nelli Finley PA-C    levothyroxine (Synthroid, Levoxyl) tablet 50 mcg, 50 mcg, oral, Daily, Nelli Finley PA-C    magnesium oxide (Mag-Ox) tablet 400 mg, 400 mg, oral, Daily, Nelli Finley PA-C    melatonin tablet 6 mg, 6 mg, oral, Nightly, Nelli Finley PA-C    ondansetron ODT (Zofran-ODT) disintegrating tablet 4 mg, 4 mg, oral, q8h PRN **OR** ondansetron (Zofran) injection  4 mg, 4 mg, intravenous, q8h PRN, Nelli Finley PA-C    [START ON 2/23/2024] pantoprazole (ProtoNix) EC tablet 40 mg, 40 mg, oral, Daily before breakfast, Nelli Finley PA-C    polyethylene glycol (Glycolax, Miralax) packet 17 g, 17 g, oral, Daily, Nelli Finley PA-C    propranolol LA (Inderal LA) 24 hr capsule 60 mg, 60 mg, oral, Daily, Nelli Finley PA-C    simvastatin (Zocor) tablet 20 mg, 20 mg, oral, Nightly, Nelli Finley PA-C    sodium chloride 0.9% infusion, 75 mL/hr, intravenous, Continuous, Nelli Finley PA-C, Last Rate: 75 mL/hr at 02/22/24 1534, 75 mL/hr at 02/22/24 1534    tamsulosin (Flomax) 24 hr capsule 0.8 mg, 0.8 mg, oral, Nightly, Nelli Finley PA-C    ALLERGIES:  Allergies   Allergen Reactions    Tramadol Headache     Tolerates morphine 10/2023, tolerates Norco PDMP    Valacyclovir Hives and Rash    Cephalexin Unknown    Adhesive Tape-Silicones Itching    Azo [Phenazopyridine] Nausea/vomiting    Macrobid [Nitrofurantoin Monohyd/M-Cryst] Rash    Trazodone Headache       COMPLETE REVIEW OF SYSTEMS:    A comprehensive 14 point review of systems was obtained.  Constitutional: Positive for weakness, poor oral intake  HENT: Negative for congestion and sore throat.    Eyes: Negative for pain and visual disturbance.  Respiratory: Negative for cough and shortness of breath.    Cardiovascular: Negative for chest pain and palpitations.  Gastrointestinal: Positive for abdominal pain, diarrhea and vomiting.  Genitourinary: Positive for dysuria  Musculoskeletal: Negative for back pain and myalgias.  Skin: negative for wound. Negative for color change and rash.  Neurological: Negative for weakness and headaches.  Hematological: Negative for adenopathy. Does not bruise/bleed easily.  Psychiatric/Behavioral: Negative for agitation and confusion.  All other reviewed and negative other than HPI.    PHYSICAL EXAM: Physical exam performed.  Patient Vitals for the past 24 hrs:   BP Temp Temp src Pulse Resp SpO2  "Height Weight   02/22/24 1519 -- -- -- -- -- -- 1.626 m (5' 4\") 56.7 kg (125 lb)   02/22/24 1449 126/77 36 °C (96.8 °F) -- 86 18 100 % -- --   02/22/24 1400 118/57 -- -- 76 (!) 21 98 % -- --   02/22/24 1330 120/58 -- -- 80 19 96 % -- --   02/22/24 1238 111/57 36.8 °C (98.2 °F) Temporal 81 20 96 % 1.626 m (5' 4\") 60 kg (132 lb 4.4 oz)     Body mass index is 21.46 kg/m².    CONSTITUTIONAL:  Vital signs reviewed.  Hypotensive  GENERAL: Ill looking  SKIN: No petechia or ecchymosis  HEAD/SINUSES: Atraumatic  EYES: PERRLA, + pallor  OROPHARYNX: Dry mucous membranes  NECK: +  jugulovenous distention, No carotid bruits, Carotid pulse normal contour, Supple  LUNGS: Diminished air entry bilaterally, no Rales  CARDIAC: distant S1 and S2; no rubs, murmurs, or gallops  ABDOMEN: Abdomen soft, non-tender, BS normal, distended  EXTREMITIES: Good capillary refill, no edema.  NEUROLOGIC: Awake, alert and oriented ×3, No focal deficit  HEMATOLOGIC/Lymphatic/Immunologic: No abnormal bleeding, echymosis, inflammation. No cervical or supraclavicular lymphadenopathy.      Diagnostic tests reviewed for today's visit:    CBC, Coags, RNP, Mg, Phos   Results from last 7 days   Lab Units 02/22/24  0929   WBC AUTO x10*3/uL 7.4   RBC AUTO x10*6/uL 3.79*   HEMOGLOBIN g/dL 10.7*   HEMATOCRIT % 32.5*     Results from last 7 days   Lab Units 02/22/24  0929   SODIUM mmol/L 130*   POTASSIUM mmol/L 3.8   CHLORIDE mmol/L 98   CO2 mmol/L 21   BUN mg/dL 52*   CREATININE mg/dL 2.98*   CALCIUM mg/dL 10.6*   MAGNESIUM mg/dL 2.26   BILIRUBIN TOTAL mg/dL 0.3   ALT U/L 7   AST U/L 16     Results from last 7 days   Lab Units 02/22/24  1343   COLOR U  Yellow   APPEARANCE U  Hazy*   PH U  5.0   SPEC GRAV UR  1.016   PROTEIN U mg/dL 30 (1+)*   BLOOD UR  NEGATIVE   NITRITE U  NEGATIVE   WBC UR /HPF 1-5   BACTERIA UR /HPF 1+*     IMAGING: CXR reviewed in  images.      SIGNATURE: Lindsay Cortez MD  Nephrology and Hypertension  [address] [place]. " "[address] [place] [address] phone: [phone]  FAX: [phone]      This note was partially created using voice recognition software and is inherently subject to errors including those of syntax and \"sound-alike\" substitutions which may escape proofreading.  In such instances, original meaning may be extrapolated by contextual derivation.    "

## 2024-02-22 NOTE — ED PROVIDER NOTES
HPI   Chief Complaint   Patient presents with   • abnormal labs       73-year-old female presenting from gastroenterology office for evaluation of acute renal failure.      She reports that she has been taking Naprosyn 500 mg twice daily for several months and she had a pelvic fracture.  She reports that she is been having increased diarrhea proximally separates episodes of light brown loose stool per day for the past 10 days approximately.  Stool studies already previously obtained by the gastroenterology team.  Patient already had blood work showing significant increase in her creatinine.  She does report that after her pelvic fracture she did require Mathias catheter for urinary retention but does not endorse any suprapubic pain, dysuria, hematuria, urinary frothiness.  Denies any flank pain or fevers.  Denies any abdominal pain.  Does state that she has been drinking p.o. fluids and attempts to keep up with her GI losses.                    Lotus Coma Scale Score: 15                     Patient History   Past Medical History:   Diagnosis Date   • Drusen (degenerative) of macula, unspecified eye 02/19/2015    Macular drusen     Past Surgical History:   Procedure Laterality Date   • OTHER SURGICAL HISTORY  03/06/2020    Rectocele and cystocele repair     No family history on file.  Social History     Tobacco Use   • Smoking status: Never   • Smokeless tobacco: Never   Vaping Use   • Vaping Use: Never used   Substance Use Topics   • Alcohol use: Defer   • Drug use: Defer       Physical Exam   ED Triage Vitals [02/22/24 1238]   Temperature Heart Rate Respirations BP   36.8 °C (98.2 °F) 81 20 111/57      Pulse Ox Temp Source Heart Rate Source Patient Position   96 % Temporal Monitor Lying      BP Location FiO2 (%)     Right arm --       Physical Exam  Vitals and nursing note reviewed.   Constitutional:       General: She is not in acute distress.     Appearance: She is well-developed. She is not ill-appearing.    HENT:      Head: Normocephalic and atraumatic.      Nose: No congestion or rhinorrhea.      Mouth/Throat:      Mouth: Mucous membranes are moist.      Pharynx: No oropharyngeal exudate or posterior oropharyngeal erythema.   Eyes:      Conjunctiva/sclera: Conjunctivae normal.   Cardiovascular:      Rate and Rhythm: Normal rate and regular rhythm.      Pulses: Normal pulses.      Heart sounds: No murmur heard.     No gallop.   Pulmonary:      Effort: Pulmonary effort is normal. No respiratory distress.      Breath sounds: Normal breath sounds. No stridor. No wheezing, rhonchi or rales.   Abdominal:      General: Bowel sounds are normal. There is no distension.      Palpations: Abdomen is soft.      Tenderness: There is no abdominal tenderness. There is no guarding or rebound.   Musculoskeletal:         General: No swelling.      Cervical back: Neck supple.   Skin:     General: Skin is warm and dry.      Capillary Refill: Capillary refill takes less than 2 seconds.      Findings: No rash.      Comments: Prior midline abdominal incision clean dry and intact.   Neurological:      General: No focal deficit present.      Mental Status: She is alert and oriented to person, place, and time.      Cranial Nerves: No cranial nerve deficit.      Sensory: No sensory deficit.      Gait: Gait normal.   Psychiatric:         Mood and Affect: Mood normal.         Behavior: Behavior normal.         Thought Content: Thought content normal.         ED Course & MDM   ED Course as of 02/22/24 2121   Thu Feb 22, 2024   1256 Vital signs reviewed and within normal limits. [TL]   1332 Patient admitted to the internal medicine service at this time.  Pending renal ultrasound which she will follow-up with her discussed with nephrology service.  I did place a nonemergent consult to the nephrology team at the request of the accepting doctor. [TL]      ED Course User Index  [TL] Jf Saeed DO         Diagnoses as of 02/22/24 2121   Acute  renal failure, unspecified acute renal failure type (CMS/[facility])       Medical Decision Making  Overall well-appearing 73-year-old female presenting with acute renal failure based on outpatient laboratory studies.  Will obtain renal ultrasound to rule out any obstructive pathology.  No CVA tenderness or sign of infection.  Urinalysis to be obtained.  Peripheral smear ordered given mild anemia although I have low suspicion for hemolytic process.  Suspect likely prerenal etiology from GI losses versus intrarenal given significant NSAID use.  However will further defer final determination to the nephrology service.  Metabolic panel does not reveal any indication for emergent dialysis.  Patient be admitted to the internal medicine service.  Will give 1 L IV fluids at this time given concern for possible prerenal ideology.  However vital signs did not reveal any signs of significant dehydration.  No tachycardia or hypotension.        Procedure  Procedures     Jf Saeed DO  02/22/24 2125

## 2024-02-22 NOTE — NURSING NOTE
1437: Pt on floor from ED at this time.    1505: Pt off floor to renal US at this time.    1553: Pt returned to floor from US at this time.    EOS note: Pt admitted to floor from ED this shift. No acute changes. Remains Aox4, room air, on tele- NSR with 1st degree AVB, SBA up. No complaints of pain. NS running at 75 mL/hr continuously at this time. COVID swab negative. Stool sample collected- remains in contact plus precautions for r/o c.diff. Pt resting comfortably in bed with bed alarm on and call light within reach at this time.

## 2024-02-22 NOTE — H&P
"History Of Present Illness  Demetria Enriquez is a 73 y.o. female with medical history of migraines, GERD, hypothyroidism, depression, anxiety, arthritis, HLD, and macular degeneration presenting to [facility] on 2/22/2024 with complaints of diarrhea. Patient reports on  2/13/24 she began having 3-4 episodes of non-bloody diarrhea described as a \"pudding\" texture and a dark brown color. Patient had sharp lower left sided abdomen associated with the pain which resolved on its own 2 days ago. Patient has had associated intermittent nausea and has felt weaker over the past few days. Daughter notes patients blood pressure has been lower than normal with the lowest being recorded around 97/60s. Daughter notes that patient has had increase in hiccups and burping over the past few days as well.  Patient was initially taking imodium, which was recommended to be stopped by her gastroenterologist outpatient. Of note patient has been taking 500 mg of Naproxen 1-2x daily since September this year, denies any use of Aleve or other NSAIDs. Patient states last bowel movement was at 2000 yesterday, she has been passing gas and is not experiencing any abdominal pain currently.    Patient was seen outpatient today by her NP Gastroenterologist who did lab work and found patient to have an acute SONIA and recommended an ED visit. In ED, patient found to have an acute SONIA with Cr elevated to 2.98, baseline 0.59. CRP and sed rate mildly elevated. CBC unremarkable. Stool cultures ordered. ED gave patient 1L of fluids.     Patient denies any recent antibiotic use, travel outside of the country, new foods, or restaurants, or people in household with similar symptoms.     Denies recent fever/chills, headaches, cough, cold symptoms, chest pain, palpitations, lightheadedness/dizziness, shortness of breath, dysuria, urgency/ frequency, hematuria, blood in stools, leg pain, leg swelling.     Past Medical History  Past Medical History:   Diagnosis Date    " Anxiety     Arthritis     Depression     Drusen (degenerative) of macula, unspecified eye 02/19/2015    Macular drusen    GERD (gastroesophageal reflux disease)     Hyperlipidemia     Hypothyroid     Migraine      Surgical History  Past Surgical History:   Procedure Laterality Date    HERNIA REPAIR      OTHER SURGICAL HISTORY  03/06/2020    Rectocele and cystocele repair       Social History  Social History     Tobacco Use    Smoking status: Never    Smokeless tobacco: Never   Vaping Use    Vaping Use: Never used   Substance Use Topics    Alcohol use: Not Currently    Drug use: Never        Family History  Family History   Problem Relation Name Age of Onset    Dementia Mother      Heart attack Father          Allergies  Tramadol, Valacyclovir, Cephalexin, Adhesive tape-silicones, Azo [phenazopyridine], Macrobid [nitrofurantoin monohyd/m-cryst], and Trazodone    Review of Systems   Constitutional:  Negative for chills, fatigue and fever.   HENT:  Negative for congestion and sore throat.    Eyes:  Negative for visual disturbance.   Respiratory:  Negative for cough and shortness of breath.    Cardiovascular:  Negative for chest pain, palpitations and leg swelling.   Gastrointestinal:  Positive for diarrhea and nausea. Negative for abdominal distention, abdominal pain, blood in stool, constipation and vomiting.   Genitourinary:  Negative for dysuria, frequency, hematuria and urgency.   Musculoskeletal:  Negative for back pain.   Skin:  Negative for rash and wound.   Neurological:  Positive for weakness. Negative for dizziness, light-headedness and headaches.   Psychiatric/Behavioral:  Negative for confusion.    All other systems reviewed and are negative.      Physical Exam  Vitals and nursing note reviewed.   Constitutional:       General: She is not in acute distress.     Appearance: Normal appearance. She is normal weight. She is not ill-appearing or toxic-appearing.   HENT:      Head: Normocephalic and atraumatic.  "     Nose: Nose normal.      Mouth/Throat:      Mouth: Mucous membranes are moist.      Pharynx: Oropharynx is clear.   Eyes:      Extraocular Movements: Extraocular movements intact.      Conjunctiva/sclera: Conjunctivae normal.   Cardiovascular:      Rate and Rhythm: Normal rate and regular rhythm.      Pulses: Normal pulses.      Heart sounds: Normal heart sounds. No murmur heard.     No friction rub. No gallop.   Pulmonary:      Effort: Pulmonary effort is normal. No respiratory distress.      Breath sounds: Normal breath sounds. No wheezing, rhonchi or rales.   Abdominal:      General: Abdomen is flat. Bowel sounds are normal.      Palpations: Abdomen is soft.      Tenderness: There is no abdominal tenderness. There is no guarding or rebound.      Hernia: No hernia is present.      Comments: Midline abdominal incision, well healed without surrounding erythema or warmth. No drainage.    Musculoskeletal:         General: No swelling or tenderness. Normal range of motion.      Cervical back: Normal range of motion and neck supple.   Skin:     General: Skin is warm and dry.   Neurological:      General: No focal deficit present.      Mental Status: She is alert and oriented to person, place, and time. Mental status is at baseline.   Psychiatric:         Mood and Affect: Mood normal.         Behavior: Behavior normal.       Last Recorded Vitals  Visit Vitals  /77   Pulse 86   Temp 36 °C (96.8 °F)   Resp 18   Ht 1.626 m (5' 4\")   Wt 56.7 kg (125 lb)   SpO2 100%   BMI 21.46 kg/m²   OB Status Postmenopausal   Smoking Status Never   BSA 1.6 m²        Relevant Results  Results for orders placed or performed during the hospital encounter of 02/22/24 (from the past 24 hour(s))   Magnesium   Result Value Ref Range    Magnesium 2.26 1.60 - 2.40 mg/dL   Urinalysis with Reflex Microscopic   Result Value Ref Range    Color, Urine Yellow Straw, Yellow    Appearance, Urine Hazy (N) Clear    Specific Gravity, Urine 1.016 " 1.005 - 1.035    pH, Urine 5.0 5.0, 5.5, 6.0, 6.5, 7.0, 7.5, 8.0    Protein, Urine 30 (1+) (N) NEGATIVE mg/dL    Glucose, Urine NEGATIVE NEGATIVE mg/dL    Blood, Urine NEGATIVE NEGATIVE    Ketones, Urine NEGATIVE NEGATIVE mg/dL    Bilirubin, Urine NEGATIVE NEGATIVE    Urobilinogen, Urine <2.0 <2.0 mg/dL    Nitrite, Urine NEGATIVE NEGATIVE    Leukocyte Esterase, Urine TRACE (A) NEGATIVE   Microscopic Only, Urine   Result Value Ref Range    WBC, Urine 1-5 1-5, NONE /HPF    RBC, Urine 3-5 NONE, 1-2, 3-5 /HPF    Squamous Epithelial Cells, Urine 10-25 (FEW) Reference range not established. /HPF    Bacteria, Urine 1+ (A) NONE SEEN /HPF    Hyaline Casts, Urine 1+ (A) NONE /LPF   Sars-CoV-2 and Influenza A/B PCR   Result Value Ref Range    Flu A Result Not Detected Not Detected    Flu B Result Not Detected Not Detected    Coronavirus 2019, PCR Not Detected Not Detected      EKG:  Encounter Date: 12/17/23   ECG 12 Lead   Result Value    Ventricular Rate 72    Atrial Rate 72    CT Interval 178    QRS Duration 82    QT Interval 410    QTC Calculation(Bazett) 448    P Axis 44    R Axis 30    T Axis 50    QRS Count 12    Q Onset 221    P Onset 132    P Offset 183    T Offset 426    QTC Fredericia 435    Narrative    Normal sinus rhythm  Early transition  Normal ECG  When compared with ECG of 11-OCT-2023 23:00,  No significant change was found  Confirmed by Binu Donovan ([id number]) on 12/18/2023 10:03:47 PM     Echo:  No results found for this or any previous visit.        Home Medications  Prior to Admission medications    Medication Sig Start Date End Date Taking? Authorizing Provider   busPIRone (Buspar) 10 mg tablet Take 1 tablet (10 mg) by mouth 2 times a day.    Historical Provider, MD   calcium carbonate 600 mg calcium (1,500 mg) tablet Take 1,200 mg by mouth once daily.    Historical Provider, MD   cholecalciferol (Vitamin D-3) 25 MCG (1000 UT) tablet Take 1 tablet (25 mcg) by mouth once daily.    Historical Provider, MD    citalopram (CeleXA) 20 mg tablet Take 1 tablet (20 mg) by mouth once daily.    Historical Provider, MD   docusate sodium (Colace) 100 mg capsule Take 2 capsules (200 mg) by mouth once daily at bedtime.    Historical Provider, MD   levothyroxine (Synthroid, Levoxyl) 50 mcg tablet Take 1 tablet (50 mcg) by mouth once daily.    Historical Provider, MD   magnesium oxide 500 mg tablet Take 1 tablet (500 mg) by mouth once daily.    Historical Provider, MD   melatonin 3 mg tablet Take 2 tablets (6 mg) by mouth once daily at bedtime.    Historical Provider, MD   multivitamin with minerals tablet Take 1 tablet by mouth once daily.    Historical Provider, MD   naproxen (Naprosyn) 500 mg tablet Take 1 tablet (500 mg) by mouth 2 times a day with meals.    Historical Provider, MD   omeprazole (PriLOSEC) 40 mg DR capsule Take 1 capsule (40 mg) by mouth once daily. 10/14/23   Historical Provider, MD   polyethylene glycol (Glycolax, Miralax) 17 gram packet Take 17 g by mouth once daily as needed (constipation).    Historical Provider, MD   psyllium (Metamucil) 3.4 gram packet Take 1 packet by mouth once daily as needed (constipation).    Historical Provider, MD   simvastatin (Zocor) 20 mg tablet Take 1 tablet (20 mg) by mouth once daily at bedtime.    Historical Provider, MD   SUMAtriptan (Imitrex) 100 mg tablet Take 1 tablet (100 mg) by mouth once daily as needed for migraine. MAY REPEAT ONCE IN 2 HOURS FOR 30 DAYS    Historical Provider, MD   tamsulosin (Flomax) 0.4 mg 24 hr capsule Take 2 capsules (0.8 mg) by mouth once daily at bedtime. 11/15/23 11/14/24  NUZHAT Tapia-CNP       Medications  Scheduled medications  busPIRone, 10 mg, oral, BID  citalopram, 20 mg, oral, Daily  hydrOXYzine HCL, 50 mg, oral, Nightly  levothyroxine, 50 mcg, oral, Daily  magnesium oxide, 400 mg, oral, Daily  melatonin, 6 mg, oral, Nightly  [START ON 2/23/2024] pantoprazole, 40 mg, oral, Daily before breakfast  polyethylene glycol, 17 g, oral,  Daily  propranolol LA, 60 mg, oral, Daily  simvastatin, 20 mg, oral, Nightly  tamsulosin, 0.8 mg, oral, Nightly        Continuous medications  sodium chloride 0.9%, 75 mL/hr, Last Rate: 75 mL/hr (02/22/24 1534)      PRN medications     Assessment/Plan   Principal Problem:    Acute renal failure, unspecified acute renal failure type (CMS/[facility])      Plan:  SONIA  - Patient's Cr at 2.98 today, baseline 0.59. Suspect either pre renal from fluid losses due to hx of diarrhea vs intrarenal due to hx of naproxen use.   - ED ordered renal US to rule out obstructive etiology, results pending.   - Patient given 1L fluids in the ED, continue NS gentle rate x1 additional liter  - UA negative, Cx pending  - Nephrology consulted.   - Will avoid nephrotoxic agents in the interim.     Diarrhea  - Patients last bowel movement was at 2000 yesterday.   - Stool pathogens and cultures ordered while in ED, pending collection.   - Patient on contact precautions.   - Will avoid any anti motility agents until stool pathogens resulted.     Hx of hypothyroidism  - Resume home levothyroxine    Hx of GERD  - Resume home omeprazole    Hx of Migraines  - Resume home propranolol.     Hx of HLD  - Resume home statin    Hx of urinary retention  - Resume home Flomax    Hx of anxiety and depression  - Resume home Xanax, Buspar, Celexa, hydroxyzine    VTE prophylaxis:   DVT prophylaxis: subcutaneous Heparin    Medication reconciliation to be completed when home medications are verified by pharmacy.   See additional orders for further plan of care.   Further evaluation and management per attending and consulting physicians.      Code Status  Full code    NADJA MckeonS  [facility]    Nelli Finley PA-C  [facility]  Pager [phone]

## 2024-02-22 NOTE — PROGRESS NOTES
Subjective   Patient ID: Demetria Enriquez is a 73 y.o. female who presents for Diarrhea.  HPI  Patient presents to the GI clinic accompanied by her daughter, Cami.  Increase amount of stress in the patient's life.  Her  was recently hospitalized for a CVA.  Over the past 6 months the patient has experienced a significant amount of health issues with falling at home, pelvic fractures and cystitis.  In December she was taken for emergency surgery for strangulated ventral hernia.    She has new complaints that started last week of diarrhea with urgency, increased frequency and incontinence.  She reports that her stool is foul-smelling.  She is experiencing nocturnal bowel movements.  No mucus, melena or hematochezia.  Roughly having 4-6 BMs per day.  Longstanding history of constipation, well-managed with MiraLAX, Metamucil and prunes.  Has been taking naproxen 500 mg twice daily for the past 6 months.  She is also taking omeprazole 40 mg daily for GI prophylaxis.  She endorses a 5 pound weight loss since her ventral hernia repair in December.  Belching, hiccups and nausea for the past 4 to 5 days.  No dysphagia or odynophagia.  No abdominal pain, just bloating and gassiness.  She did have antibiotics in December, 2023 postop.  She reports her last colonoscopy was 5 to 8 years ago at [facility] with history of colon polyps.  She has an upcoming appointment with her GI provider on 3/4/2024 at [facility].    She denies fever or chills.  No recent travel.    Abdominal surgeries: 12/19/2023 ventral hernia repair-strangulated ventral hernia.  Review of Systems   All other systems reviewed and are negative.      Objective   Physical Exam  Vitals reviewed.   Constitutional:       General: She is awake. She is not in acute distress.     Appearance: Normal appearance. She is not ill-appearing, toxic-appearing or diaphoretic.   HENT:      Head: Normocephalic and atraumatic.   Eyes:      General: No scleral icterus.     Extraocular  Movements: Extraocular movements intact.      Pupils: Pupils are equal, round, and reactive to light.   Cardiovascular:      Rate and Rhythm: Normal rate and regular rhythm.      Heart sounds: Normal heart sounds. No murmur heard.  Pulmonary:      Effort: Pulmonary effort is normal. No respiratory distress.      Breath sounds: Normal breath sounds.   Abdominal:      General: Abdomen is protuberant. A surgical scar is present. Bowel sounds are normal.      Palpations: Abdomen is soft. There is no hepatomegaly.      Tenderness: There is no abdominal tenderness. There is no guarding or rebound.      Hernia: No hernia is present.          Comments: Ventral hernia repair surgical incision. Well approx. No erythema or drainage. Healed well.    Musculoskeletal:         General: Normal range of motion.      Cervical back: Normal range of motion.      Right lower leg: No edema.      Left lower leg: No edema.   Skin:     General: Skin is warm and dry.      Coloration: Skin is not jaundiced or pale.   Neurological:      General: No focal deficit present.      Mental Status: She is alert and oriented to person, place, and time.      Motor: No weakness.      Gait: Gait normal.   Psychiatric:         Mood and Affect: Mood normal.         Behavior: Behavior is cooperative.         Thought Content: Thought content normal.         Judgment: Judgment normal.         Assessment/Plan   Diagnoses and all orders for this visit:  Diarrhea, unspecified type  73-year-old female with acute GI complaints that roughly started 1 week ago.  No red flag symptoms noted and the patient is in NAD/nontoxic.  Clinical history suggestive of viral etiology versus bacterial.  Microscopic colitis in differential due to NSAID use.  Recommend she obtain stool studies and labs for POC.  At this time I recommend supportive care with a bland diet and adequate fluids throughout the day.  Avoid Imodium use until stool studies return.  ED if symptoms worsen or  become severe.  Follow-up pending results.  -     Calprotectin Stool; Future  -     C. difficile, PCR  -     Stool Pathogen Panel, PCR; Future  -     Ova/Para + Giardia/Cryptosporidium Antigen; Future  -     Sedimentation Rate; Future  -     C-reactive protein; Future  -     CBC; Future  -     Comprehensive metabolic panel; Future  Nausea  Early satiety  Belching           NUZHAT Soria-CNP 02/22/24 9:19 AM

## 2024-02-22 NOTE — TELEPHONE ENCOUNTER
Patient seen today in the clinic for diarrhea and nausea.  Her symptoms are acute in nature, started last week.  Noted on today's blood work SONIA.  Patient instructed to go to the nearest ED.

## 2024-02-22 NOTE — ED NOTES
Pt biba sent by urologist for abnormal  labs SONIA. Pt placed on monitor call bell within reach.      Ely Rosales, RN  02/22/24 0503

## 2024-02-22 NOTE — PATIENT INSTRUCTIONS
Thank you for seeing us in clinic today!     In summary, please do the following:    Please get your bloodwork and stool test at your earliest convenience.       We will see you again once I obtain results   If you have any questions or concerns, please call the office at [phone]

## 2024-02-23 ENCOUNTER — APPOINTMENT (OUTPATIENT)
Dept: RADIOLOGY | Facility: HOSPITAL | Age: 74
DRG: 683 | End: 2024-02-23
Payer: MEDICARE

## 2024-02-23 LAB
ANION GAP SERPL CALC-SCNC: 14 MMOL/L (ref 10–20)
BUN SERPL-MCNC: 47 MG/DL (ref 6–23)
C DIF TOX TCDA+TCDB STL QL NAA+PROBE: NOT DETECTED
CALCIUM SERPL-MCNC: 9.5 MG/DL (ref 8.6–10.3)
CHLORIDE SERPL-SCNC: 104 MMOL/L (ref 98–107)
CO2 SERPL-SCNC: 18 MMOL/L (ref 21–32)
CREAT SERPL-MCNC: 2.58 MG/DL (ref 0.5–1.05)
EGFRCR SERPLBLD CKD-EPI 2021: 19 ML/MIN/1.73M*2
ERYTHROCYTE [DISTWIDTH] IN BLOOD BY AUTOMATED COUNT: 13.9 % (ref 11.5–14.5)
GLUCOSE SERPL-MCNC: 88 MG/DL (ref 74–99)
HCT VFR BLD AUTO: 30.2 % (ref 36–46)
HGB BLD-MCNC: 9.2 G/DL (ref 12–16)
MCH RBC QN AUTO: 27.6 PG (ref 26–34)
MCHC RBC AUTO-ENTMCNC: 30.5 G/DL (ref 32–36)
MCV RBC AUTO: 91 FL (ref 80–100)
NRBC BLD-RTO: 0 /100 WBCS (ref 0–0)
PLATELET # BLD AUTO: 205 X10*3/UL (ref 150–450)
POTASSIUM SERPL-SCNC: 3.8 MMOL/L (ref 3.5–5.3)
PROT SERPL-MCNC: 6.2 G/DL (ref 6.4–8.2)
RBC # BLD AUTO: 3.33 X10*6/UL (ref 4–5.2)
SODIUM SERPL-SCNC: 132 MMOL/L (ref 136–145)
WBC # BLD AUTO: 6.2 X10*3/UL (ref 4.4–11.3)

## 2024-02-23 PROCEDURE — 74018 RADEX ABDOMEN 1 VIEW: CPT

## 2024-02-23 PROCEDURE — 2500000004 HC RX 250 GENERAL PHARMACY W/ HCPCS (ALT 636 FOR OP/ED): Performed by: PHYSICIAN ASSISTANT

## 2024-02-23 PROCEDURE — 99222 1ST HOSP IP/OBS MODERATE 55: CPT | Performed by: NURSE PRACTITIONER

## 2024-02-23 PROCEDURE — 2500000004 HC RX 250 GENERAL PHARMACY W/ HCPCS (ALT 636 FOR OP/ED): Performed by: INTERNAL MEDICINE

## 2024-02-23 PROCEDURE — 87329 GIARDIA AG IA: CPT | Performed by: NURSE PRACTITIONER

## 2024-02-23 PROCEDURE — 82570 ASSAY OF URINE CREATININE: CPT | Performed by: INTERNAL MEDICINE

## 2024-02-23 PROCEDURE — 84155 ASSAY OF PROTEIN SERUM: CPT | Mod: STJLAB | Performed by: INTERNAL MEDICINE

## 2024-02-23 PROCEDURE — 87506 IADNA-DNA/RNA PROBE TQ 6-11: CPT | Mod: STJLAB | Performed by: NURSE PRACTITIONER

## 2024-02-23 PROCEDURE — 86320 SERUM IMMUNOELECTROPHORESIS: CPT | Performed by: INTERNAL MEDICINE

## 2024-02-23 PROCEDURE — 2500000002 HC RX 250 W HCPCS SELF ADMINISTERED DRUGS (ALT 637 FOR MEDICARE OP, ALT 636 FOR OP/ED): Performed by: PHYSICIAN ASSISTANT

## 2024-02-23 PROCEDURE — 80048 BASIC METABOLIC PNL TOTAL CA: CPT | Performed by: PHYSICIAN ASSISTANT

## 2024-02-23 PROCEDURE — 1200000002 HC GENERAL ROOM WITH TELEMETRY DAILY

## 2024-02-23 PROCEDURE — 87328 CRYPTOSPORIDIUM AG IA: CPT | Performed by: NURSE PRACTITIONER

## 2024-02-23 PROCEDURE — 36415 COLL VENOUS BLD VENIPUNCTURE: CPT | Performed by: INTERNAL MEDICINE

## 2024-02-23 PROCEDURE — 84165 PROTEIN E-PHORESIS SERUM: CPT | Performed by: INTERNAL MEDICINE

## 2024-02-23 PROCEDURE — 86334 IMMUNOFIX E-PHORESIS SERUM: CPT | Mod: STJLAB | Performed by: INTERNAL MEDICINE

## 2024-02-23 PROCEDURE — 85027 COMPLETE CBC AUTOMATED: CPT | Performed by: PHYSICIAN ASSISTANT

## 2024-02-23 PROCEDURE — 2500000001 HC RX 250 WO HCPCS SELF ADMINISTERED DRUGS (ALT 637 FOR MEDICARE OP): Performed by: PHYSICIAN ASSISTANT

## 2024-02-23 PROCEDURE — 74018 RADEX ABDOMEN 1 VIEW: CPT | Performed by: RADIOLOGY

## 2024-02-23 RX ORDER — SODIUM BICARBONATE 650 MG/1
650 TABLET ORAL 3 TIMES DAILY
Status: DISCONTINUED | OUTPATIENT
Start: 2024-02-23 | End: 2024-02-25 | Stop reason: HOSPADM

## 2024-02-23 RX ADMIN — ALPRAZOLAM 0.25 MG: 0.25 TABLET ORAL at 21:54

## 2024-02-23 RX ADMIN — PROPRANOLOL HYDROCHLORIDE 60 MG: 60 CAPSULE, EXTENDED RELEASE ORAL at 09:17

## 2024-02-23 RX ADMIN — LEVOTHYROXINE SODIUM 50 MCG: 50 TABLET ORAL at 06:18

## 2024-02-23 RX ADMIN — Medication 400 MG: at 09:17

## 2024-02-23 RX ADMIN — PANTOPRAZOLE SODIUM 40 MG: 40 TABLET, DELAYED RELEASE ORAL at 06:18

## 2024-02-23 RX ADMIN — SODIUM CHLORIDE, POTASSIUM CHLORIDE, SODIUM LACTATE AND CALCIUM CHLORIDE 100 ML/HR: 600; 310; 30; 20 INJECTION, SOLUTION INTRAVENOUS at 17:37

## 2024-02-23 RX ADMIN — BUSPIRONE HYDROCHLORIDE 10 MG: 10 TABLET ORAL at 09:17

## 2024-02-23 RX ADMIN — BUSPIRONE HYDROCHLORIDE 10 MG: 10 TABLET ORAL at 20:10

## 2024-02-23 RX ADMIN — SIMVASTATIN 20 MG: 20 TABLET, FILM COATED ORAL at 20:10

## 2024-02-23 RX ADMIN — CITALOPRAM HYDROBROMIDE 20 MG: 20 TABLET ORAL at 09:17

## 2024-02-23 RX ADMIN — SODIUM BICARBONATE 650 MG: 650 TABLET ORAL at 20:10

## 2024-02-23 RX ADMIN — HYDROXYZINE HYDROCHLORIDE 50 MG: 50 TABLET, FILM COATED ORAL at 20:10

## 2024-02-23 RX ADMIN — SODIUM CHLORIDE, POTASSIUM CHLORIDE, SODIUM LACTATE AND CALCIUM CHLORIDE 100 ML/HR: 600; 310; 30; 20 INJECTION, SOLUTION INTRAVENOUS at 06:21

## 2024-02-23 RX ADMIN — TAMSULOSIN HYDROCHLORIDE 0.8 MG: 0.4 CAPSULE ORAL at 20:10

## 2024-02-23 RX ADMIN — Medication 6 MG: at 20:09

## 2024-02-23 SDOH — ECONOMIC STABILITY: INCOME INSECURITY: IN THE PAST 12 MONTHS, HAS THE ELECTRIC, GAS, OIL, OR WATER COMPANY THREATENED TO SHUT OFF SERVICE IN YOUR HOME?: NO

## 2024-02-23 SDOH — ECONOMIC STABILITY: FOOD INSECURITY: WITHIN THE PAST 12 MONTHS, THE FOOD YOU BOUGHT JUST DIDN'T LAST AND YOU DIDN'T HAVE MONEY TO GET MORE.: NEVER TRUE

## 2024-02-23 SDOH — ECONOMIC STABILITY: FOOD INSECURITY: WITHIN THE PAST 12 MONTHS, YOU WORRIED THAT YOUR FOOD WOULD RUN OUT BEFORE YOU GOT MONEY TO BUY MORE.: NEVER TRUE

## 2024-02-23 ASSESSMENT — COGNITIVE AND FUNCTIONAL STATUS - GENERAL
MOBILITY SCORE: 23
DAILY ACTIVITIY SCORE: 24
CLIMB 3 TO 5 STEPS WITH RAILING: A LITTLE
CLIMB 3 TO 5 STEPS WITH RAILING: A LITTLE
DAILY ACTIVITIY SCORE: 24
MOBILITY SCORE: 23

## 2024-02-23 ASSESSMENT — ACTIVITIES OF DAILY LIVING (ADL): LACK_OF_TRANSPORTATION: NO

## 2024-02-23 ASSESSMENT — PAIN SCALES - GENERAL
PAINLEVEL_OUTOF10: 0 - NO PAIN
PAINLEVEL_OUTOF10: 0 - NO PAIN

## 2024-02-23 NOTE — CARE PLAN
The patient's goals for the shift include      The clinical goals for the shift include Pt will remain HDS throughout this shift      Problem: Pain - Adult  Goal: Verbalizes/displays adequate comfort level or baseline comfort level  Outcome: Progressing     Problem: Safety - Adult  Goal: Free from fall injury  Outcome: Progressing     Problem: Chronic Conditions and Co-morbidities  Goal: Patient's chronic conditions and co-morbidity symptoms are monitored and maintained or improved  Outcome: Progressing     Problem: Fall/Injury  Goal: Not fall by end of shift  Outcome: Progressing  Goal: Be free from injury by end of the shift  Outcome: Progressing  Goal: Verbalize understanding of personal risk factors for fall in the hospital  Outcome: Progressing  Goal: Verbalize understanding of risk factor reduction measures to prevent injury from fall in the home  Outcome: Progressing  Goal: Use assistive devices by end of the shift  Outcome: Progressing  Goal: Pace activities to prevent fatigue by end of the shift  Outcome: Progressing

## 2024-02-23 NOTE — CARE PLAN
The patient's goals for the shift include      The clinical goals for the shift include see care plan      Problem: Pain - Adult  Goal: Verbalizes/displays adequate comfort level or baseline comfort level  Outcome: Progressing  Flowsheets (Taken 2/23/2024 1752)  Verbalizes/displays adequate comfort level or baseline comfort level: Encourage patient to monitor pain and request assistance     Problem: Safety - Adult  Goal: Free from fall injury  Outcome: Progressing  Flowsheets (Taken 2/23/2024 1752)  Free from fall injury: Instruct family/caregiver on patient safety     Problem: Discharge Planning  Goal: Discharge to home or other facility with appropriate resources  Outcome: Progressing  Flowsheets (Taken 2/23/2024 1752)  Discharge to home or other facility with appropriate resources: Identify barriers to discharge with patient and caregiver     Problem: Chronic Conditions and Co-morbidities  Goal: Patient's chronic conditions and co-morbidity symptoms are monitored and maintained or improved  Outcome: Progressing  Flowsheets (Taken 2/23/2024 1752)  Care Plan - Patient's Chronic Conditions and Co-Morbidity Symptoms are Monitored and Maintained or Improved: Monitor and assess patient's chronic conditions and comorbid symptoms for stability, deterioration, or improvement     Problem: Fall/Injury  Goal: Not fall by end of shift  Outcome: Progressing  Goal: Be free from injury by end of the shift  Outcome: Progressing  Goal: Verbalize understanding of personal risk factors for fall in the hospital  Outcome: Progressing  Goal: Verbalize understanding of risk factor reduction measures to prevent injury from fall in the home  Outcome: Progressing  Goal: Use assistive devices by end of the shift  Outcome: Progressing  Goal: Pace activities to prevent fatigue by end of the shift  Outcome: Progressing     Problem: Nutrition  Goal: Oral intake greater than 50%  Outcome: Progressing  Goal: Oral intake greater 75%  Outcome:  Progressing  Goal: Adequate PO fluid intake  Outcome: Progressing  Goal: Lab values WNL  Outcome: Progressing  Goal: Electrolytes WNL  Outcome: Progressing  Goal: Maintain stable weight  Outcome: Progressing     Eos note: Pt remained oriented x4 and very pleasant. Pain on sides of abdomen but patient continued to pass gas and said that the pain was relieved with that and ambulation.. Ambulated to bathroom with a one assist but continued to have very loose and watery stools.Multiple stool samples taken down to test, results are still pending. Cdiff (-). Ate all meals. KUB taken per [place] - also said to hold laxatives and hold mag ox, will get colonoscopy outpatient. Continues to be on maintenance fluids at 100/hr.     Pt resting at this time. Bed is in lowest position and locked with alarm on. Call light and personal possesions are within reach.

## 2024-02-23 NOTE — NURSING NOTE
SHIFT SUMMARY:  Uneventful night. Pt had 2 liquid brown BM's. Still waiting for C-diff results. Pt stated only mild intermittent pain on bilateral abd sides. No pain meds needed. NS changed to LR continuous and rate increased. Pt had several loose BM's. Safety maintained.

## 2024-02-23 NOTE — PROGRESS NOTES
02/23/24 0950   Discharge Planning   Living Arrangements Spouse/significant other;Children   Support Systems Spouse/significant other;Children   Type of Residence Private residence   Number of Stairs to Enter Residence 1   Number of Stairs Within Residence   (2 flights)   Home or Post Acute Services None   Patient expects to be discharged to: Home   Does the patient need discharge transport arranged? No   Financial Resource Strain   How hard is it for you to pay for the very basics like food, housing, medical care, and heating? Not hard   Housing Stability   In the last 12 months, was there a time when you were not able to pay the mortgage or rent on time? N   In the last 12 months, was there a time when you did not have a steady place to sleep or slept in a shelter (including now)? N   Transportation Needs   In the past 12 months, has lack of transportation kept you from medical appointments or from getting medications? no   In the past 12 months, has lack of transportation kept you from meetings, work, or from getting things needed for daily living? No     Met with patient at bedside. Patient lives at home in [place] with her spouse and daughter. Patient is independent, no use of DME at home. She sometimes takes a cane with her if she is going somewhere that she will be doing a lot of walking. PCP is Dr Morris. Patient states she no longer drives due to her macular degeneration and her daughter drives her as needed. Patient plans to return home when medically ready.

## 2024-02-23 NOTE — PROGRESS NOTES
Demetria Enriquez is a 73 y.o. female on day 1 of admission presenting with Acute renal failure, unspecified acute renal failure type (CMS/HCC).    Subjective   Patient with medical history of recent pelvic fracture after a fall and strangulated ventral hernia, has been taking Naprosyn 500 mg bid for several months due to pelvic and hip pain.  She has long standing chronic idiopathic constipation, however 10 days ago started with diarrheal stools 4-6 per day, and 3 days ago it became liquid every 2 hours with nocturnal diarrhea.  She had tried imodium and soft liquid diet without improvement and her regular GI Dr Jesus was not returning her phone calls so she called  GI and saw NP yesterday, labs showed elevated creatinine of 2.58 from a baseline of 0.59.  She was instructed to come to ED.  Since admission she reports she continues to have watery stools every 1-2 hours and wears depends that are soaked and continues to have nocturnal diarrhea.    C. Diff is negative.    US Kidneys are normal       Objective     Last Recorded Vitals  /56   Pulse 74   Temp 36.2 °C (97.2 °F) (Temporal)   Resp 15   Wt 56.7 kg (125 lb)   SpO2 98%   Intake/Output last 3 Shifts:    Intake/Output Summary (Last 24 hours) at 2/23/2024 0733  Last data filed at 2/23/2024 0030  Gross per 24 hour   Intake 2102.5 ml   Output --   Net 2102.5 ml       Admission Weight  Weight: 60 kg (132 lb 4.4 oz) (02/22/24 1238)    Daily Weight  02/22/24 : 56.7 kg (125 lb)      Physical Exam:  General: Not in acute distress, alert  HEENT: PERRLA, head intact and normocephalic  Neck: Normal to inspection  Lungs: Clear to auscultation, work of breathing within normal limit  Cardiac: Regular rate and rhythm  Abdomen: Soft nontender, positive bowel sounds  : Exam deferred  Skin: Intact  Hematology: No petechia or excessive ecchymosis  Musculoskeletal: Without significant trauma  Neurological: Alert awake oriented, no focal deficit, cranial nerves  grossly intact  Psych: No suicidal ideation or homicidal ideation    Relevant Results  Scheduled medications  busPIRone, 10 mg, oral, BID  citalopram, 20 mg, oral, Daily  hydrOXYzine HCL, 50 mg, oral, Nightly  levothyroxine, 50 mcg, oral, Daily  magnesium oxide, 400 mg, oral, Daily  melatonin, 6 mg, oral, Nightly  pantoprazole, 40 mg, oral, Daily before breakfast  polyethylene glycol, 17 g, oral, Daily  propranolol LA, 60 mg, oral, Daily  simvastatin, 20 mg, oral, Nightly  tamsulosin, 0.8 mg, oral, Nightly      Continuous medications  lactated Ringer's, 100 mL/hr, Last Rate: 100 mL/hr (02/23/24 0621)      PRN medications  PRN medications: ALPRAZolam, ondansetron ODT **OR** ondansetron   Results for orders placed or performed during the hospital encounter of 02/22/24 (from the past 24 hour(s))   Magnesium   Result Value Ref Range    Magnesium 2.26 1.60 - 2.40 mg/dL   Urinalysis with Reflex Microscopic   Result Value Ref Range    Color, Urine Yellow Straw, Yellow    Appearance, Urine Hazy (N) Clear    Specific Gravity, Urine 1.016 1.005 - 1.035    pH, Urine 5.0 5.0, 5.5, 6.0, 6.5, 7.0, 7.5, 8.0    Protein, Urine 30 (1+) (N) NEGATIVE mg/dL    Glucose, Urine NEGATIVE NEGATIVE mg/dL    Blood, Urine NEGATIVE NEGATIVE    Ketones, Urine NEGATIVE NEGATIVE mg/dL    Bilirubin, Urine NEGATIVE NEGATIVE    Urobilinogen, Urine <2.0 <2.0 mg/dL    Nitrite, Urine NEGATIVE NEGATIVE    Leukocyte Esterase, Urine TRACE (A) NEGATIVE   Microscopic Only, Urine   Result Value Ref Range    WBC, Urine 1-5 1-5, NONE /HPF    RBC, Urine 3-5 NONE, 1-2, 3-5 /HPF    Squamous Epithelial Cells, Urine 10-25 (FEW) Reference range not established. /HPF    Bacteria, Urine 1+ (A) NONE SEEN /HPF    Hyaline Casts, Urine 1+ (A) NONE /LPF   Sars-CoV-2 and Influenza A/B PCR   Result Value Ref Range    Flu A Result Not Detected Not Detected    Flu B Result Not Detected Not Detected    Coronavirus 2019, PCR Not Detected Not Detected   C. difficile, PCR     Specimen: Stool   Result Value Ref Range    C. difficile, PCR Not Detected Not Detected   CBC   Result Value Ref Range    WBC 6.2 4.4 - 11.3 x10*3/uL    nRBC 0.0 0.0 - 0.0 /100 WBCs    RBC 3.33 (L) 4.00 - 5.20 x10*6/uL    Hemoglobin 9.2 (L) 12.0 - 16.0 g/dL    Hematocrit 30.2 (L) 36.0 - 46.0 %    MCV 91 80 - 100 fL    MCH 27.6 26.0 - 34.0 pg    MCHC 30.5 (L) 32.0 - 36.0 g/dL    RDW 13.9 11.5 - 14.5 %    Platelets 205 150 - 450 x10*3/uL      Lab Results   Component Value Date    URINECULTURE >100,000 Escherichia coli (A) 12/17/2023       US renal complete    Result Date: 2/22/2024  STUDY: Renal and Bladder Ultrasound; 2/22/2024 3:55 PM INDICATION:  Acute renal failure.  COMPARISON:  CT A/P 12/18/2023, 10/12/2023. ACCESSION NUMBER(S): [id number] ORDERING CLINICIAN: CHAVA LUA TECHNIQUE: Ultrasound of the Kidneys and Bladder.  FINDINGS: RIGHT KIDNEY: The right kidney measures 10.6 cm in length.  Renal cortical echotexture is normal.  There is no hydronephrosis.  There are no stones.  There is a simple cyst within the mid pole measuring 2.1 x 1.0 x 1.8 cm. LEFT KIDNEY: The left kidney measures 10.8 cm in length.  Renal parenchyma is suboptimally visualized due to overlying bowel gas.  There is no hydronephrosis.  There are no stones.  There are no cysts.  BLADDER: The urinary bladder is anechoic.  The distended bladder shows no evidence of wall thickening.  The distended bladder volume is 130 ml. There is no significant post void residual.  Bilateral ureteral jets are visualized.      No hydronephrosis bilaterally. Bladder anatomy is not evaluated in detail.  No bladder masses or stones are identified. There is no significant postvoid residual. Adults and Elderly *Less than 100 ml PVR is considered normal. *Up to 200 ml PVR may be acceptable. *Over 200 ml PVR indicates inadequate emptying. *Over 300 ml is suggestive of urinary retention. *Over 400 ml is considered urinary retention. Children *Over20 ml PVR is  considered abnormal but varies with age. Urinary Retention *A PVR over 500 ml is widely considered abnormal and generally diagnostic of urinary retention.  In addition, itmay be highly predictive of cauda equina syndrome if associated with other neurological findings. Signed by Mook Mishra MD          Assessment/Plan   Demetria Enriquez is a 73 y.o. female on day 1 of admission presenting with Acute renal failure, unspecified acute renal failure type (CMS/HCC).  Principal Problem:    Acute renal failure, unspecified acute renal failure type (CMS/HCC)    SONIA in setting of diarrhea of 2 weeks.   Prerenal related to dehydration, worsening Creatinine to 2.98  Started on  ml/hr  Avoid NSAID use.  Diarrhea  Continue IV hydration.  Low residual diet  Possible microscopic colitis from NSAID use.  Consult GI Dr Cortez for possible in patient VS out patient colonoscopy  Anemia  Continue to monitor  Chronic Recurrent Depression in Remission  with Generalized Anxiety Disorder  Continue Buspirone and Citalopram  Insomnia  Continue Hydroxyzine and Melatonin  GERD  Continue PPI  Hypothyroidism  Continue Levothyroxine  HLD  Continue Simvastatin  Urinary Retention  Continue Tamsulosin  Migraine  Continue Propranolol LA for migraine prophylaxis  Chronic idiopathic constipation  Currently not an issue    Plan discussed with patient at bedside and daughter Cami via phone        Ophelia Morris MD

## 2024-02-23 NOTE — PROGRESS NOTES
"Nutrition Initial Assessment:   Nutrition Assessment    Reason for Assessment: Admission nursing screening (MST score 3 with wt loss and decreased appetite. RD needed w/no consult.)    Patient is a 73 y.o. female presenting from home w/daughter and  for acute renal failure. Nephrology and GI on consult. Pt is cdiff negative.       Past Medical History: insomnia   has a past medical history of Anxiety, Arthritis, Depression, Drusen (degenerative) of macula, unspecified eye (02/19/2015), GERD (gastroesophageal reflux disease), Hyperlipidemia, Hypothyroid, and Migraine.  Surgical History   has a past surgical history that includes Other surgical history (03/06/2020) and Hernia repair.      Nutrition History:  Food and Nutrient History: Pt w/hx of chronic constipation and now w/10 days of diarrhea. Pt has been avoiding dairy and following a \"bland diet\" since 2/19. Pt has Ensure at home (vanilla), but only drinks a couple times a week - does not really like it. Pt endorses decreased appetite since December 2023 surgery. Pt is able to eat, but just smaller portions.  Vitamin/Herbal Supplement Use: Ensure a couple times a week.  Food Allergies/Intolerances:  None  GI Symptoms: Diarrhea  Oral Problems: None       Anthropometrics:  Height: 162.6 cm (5' 4\")   Weight: 56.7 kg (125 lb)   BMI (Calculated): 21.45             Weight History:     Weight Change %:  Weight History / % Weight Change: Per archives 12/20/23 137#, 12/7/23 142#, 6/16/23 150#, 11/20 157#. Wt loss of 11.9% x 3 months.  Significant Weight Loss: Yes  Interpretation of Weight Loss: >7.5% in 3 months    Nutrition Focused Physical Exam Findings:    Subcutaneous Fat Loss:   Orbital Fat Pads: Mild-Moderate (slight dark circles and slight hollowing)  Buccal Fat Pads: Well nourished (full, rounded cheeks)  Triceps: Mild-moderate (less than ample fat tissue)  Muscle Wasting:  Temporalis: Mild-Moderate (slight depression)  Pectoralis (Clavicular Region): Well " "nourished (clavicle not visible)  Quadriceps: Well nourished (well developed, well rounded)  Gastrocnemius: Well nourished (well developed bulbous muscle)  Edema:  Edema: +1 trace  Edema Location: generalized  Physical Findings:  Skin: Negative (bruising)    Nutrition Significant Labs:  CBC Trend:   Results from last 7 days   Lab Units 02/23/24  0536 02/22/24  0929   WBC AUTO x10*3/uL 6.2 7.4   RBC AUTO x10*6/uL 3.33* 3.79*   HEMOGLOBIN g/dL 9.2* 10.7*   HEMATOCRIT % 30.2* 32.5*   MCV fL 91 86   PLATELETS AUTO x10*3/uL 205 261    , BMP Trend:   Results from last 7 days   Lab Units 02/23/24  0537 02/22/24  0929   GLUCOSE mg/dL 88 101*   CALCIUM mg/dL 9.5 10.6*   SODIUM mmol/L 132* 130*   POTASSIUM mmol/L 3.8 3.8   CO2 mmol/L 18* 21   CHLORIDE mmol/L 104 98   BUN mg/dL 47* 52*   CREATININE mg/dL 2.58* 2.98*    , A1C:No results found for: \"HGBA1C\", BG POCT trend:    , Liver Function Trend:   Results from last 7 days   Lab Units 02/22/24  0929   ALK PHOS U/L 95   AST U/L 16   ALT U/L 7   BILIRUBIN TOTAL mg/dL 0.3    , Renal Lab Trend:   Results from last 7 days   Lab Units 02/23/24  0537 02/22/24  0929   POTASSIUM mmol/L 3.8 3.8   SODIUM mmol/L 132* 130*   MAGNESIUM mg/dL  --  2.26   EGFR mL/min/1.73m*2 19* 16*   BUN mg/dL 47* 52*   CREATININE mg/dL 2.58* 2.98*    , Lipid Panel: No results found for: \"CHOL\", \"HDL\", \"CHHDL\", \"LDLF\", \"VLDL\", \"TRIG\" , Vit D: No results found for: \"VITD25\" , Vit B12: No results found for: \"TSSGAAJU75\" , Iron Panel: No results found for: \"IRON\", \"TIBC\", \"FERRITIN\" , Folate: No results found for: \"FOLATE\"     Nutrition Specific Medications:  Scheduled medications  busPIRone, 10 mg, oral, BID  citalopram, 20 mg, oral, Daily  hydrOXYzine HCL, 50 mg, oral, Nightly  levothyroxine, 50 mcg, oral, Daily  magnesium oxide, 400 mg, oral, Daily  melatonin, 6 mg, oral, Nightly  pantoprazole, 40 mg, oral, Daily before breakfast  polyethylene glycol, 17 g, oral, Daily  propranolol LA, 60 mg, oral, " Daily  simvastatin, 20 mg, oral, Nightly  tamsulosin, 0.8 mg, oral, Nightly      Continuous medications  lactated Ringer's, 100 mL/hr, Last Rate: 100 mL/hr (02/23/24 0621)      PRN medications  PRN medications: ALPRAZolam, ondansetron ODT **OR** ondansetron    Pain Score: 0 - No pain       I/O:   Last BM Date: 02/23/24; Stool Appearance: Watery (02/23/24 0755)        Dietary Orders (From admission, onward)       Start     Ordered    02/23/24 1430  Oral nutritional supplements  Until discontinued        Comments: vanilla   Question Answer Comment   Deliver with All meals    Select supplement: Ensure High Protein        02/23/24 1430    02/22/24 1526  May Participate in Room Service With Assistance  Once        Question:  .  Answer:  Yes    02/22/24 1525    02/22/24 1500  Adult diet Regular  Diet effective now        Question:  Diet type  Answer:  Regular    02/22/24 1500                     Estimated Needs:   Total Energy Estimated Needs (kCal):  (8584-5880 (25-30kcal/kg))     Total Protein Estimated Needs (g):  (56-74g (1.0-1.3g/kg))     Total Fluid Estimated Needs (mL):  (1mL/kcal)           Nutrition Diagnosis   Malnutrition Diagnosis  Patient has Malnutrition Diagnosis: Yes  Diagnosis Status: New  Malnutrition Diagnosis: Moderate malnutrition related to acute disease or injury  As Evidenced by: moderate subcutaneous fat loss and 11.9% unintentional wt loss x 3 months.            Nutrition Interventions/Recommendations         Nutrition Prescription:  Individualized Nutrition Prescription Provided for : Diet: suggest Low Fiber diet.        Nutrition Interventions:   Interventions: Meals and snacks, Medical food supplement  Meals and Snacks: Fiber-modified diet  Goal: will consume 75% of meals.  Medical Food Supplement: Commercial beverage  Goal: Will provide Ensure High Protein (160kcal, 16g pro per 8oz serving) TID w/meals.    Collaboration and Referral of Nutrition Care: Collaboration by nutrition professional  with other providers  Goal: SEN [place]    Nutrition Education:   Discussed AYR ordering and ONS. Will follow plan of care for education needs.       Nutrition Monitoring and Evaluation   Food/Nutrient Related History Monitoring  Monitoring and Evaluation Plan: Energy intake  Energy Intake: Estimated energy intake  Criteria: Will meet 75% of estimated energy needs and will consume provided oral nutrition supplements.         Biochemical Data, Medical Tests and Procedures  Monitoring and Evaluation Plan: Electrolyte/renal panel, GI profile  Electrolyte and Renal Panel: Sodium, Potassium, Phosphorus, Magnesium, Creatinine, BUN  Criteria: WNR  Gastrointestinal Profile: Other (Comment)  Criteria: reduce bowel movement frequency.            Time Spent/Follow-up Reminder:   Time Spent (min): 60 minutes  Last Date of Nutrition Visit: 02/23/24  Nutrition Follow-Up Needed?: 3-8 days  Follow up Comment: NORMA

## 2024-02-23 NOTE — CONSULTS
Reason for consult  diarrhea    HPI  Demetria Enriquez is a 73 y.o. female presenting with diarrhea.  PMH significant for constipation, diarrhea.  Patient presented per GI NP for evaluation of acute renal failure.  She has longstanding history of constipation but had been reporting loose brown stool for 10 days prior to arrival.  Patient had been having increased life stressors with  having had stoke this past weekend.  She has been noting increased urgency, frequency.  She states stool has been foul-smelling.  It woke her out of her sleep initially just over a week ago.  She continued to have 2-3 episodes per day.  Initially of pudding consistency becoming more liquid. She endorses belching, hiccups and nausea for the past week. She has been taking 1/2 capful MiraLAX, Metamucil twice daily, prunes, senna tea and Colace for constipation. She takes omeprazole daily for GI prophylaxis.   She reports taking Naprosyn 500 mg twice daily for several months following pelvic fracture. No hematochezia or melena.  She took Imodium without relief.  C. difficile negative.  Stool pathogens, O&P pending.  She states that her last colonoscopy was 8 years ago with Dr. Walker but she wishes to transition to  for further management.  She did have polyps in the past.  She states that she has had previous issues with material getting caught up in her colon in the LUQ noted on imaging through previous GI care.  She currently has no complaints of lightheadedness, chest pain, SOB, nausea, abdominal pain.  Patient does not suspect any outdated or undercooked foods.  She does not eat out at restaurants.    PMH  She has a past medical history of Anxiety, Arthritis, Depression, Drusen (degenerative) of macula, unspecified eye (02/19/2015), GERD (gastroesophageal reflux disease), Hyperlipidemia, Hypothyroid, and Migraine.    PSH  She has a past surgical history that includes Other surgical history (03/06/2020) and Hernia repair.   "12/2023 with Dr. Giraldo    Family  Family History   Problem Relation Name Age of Onset    Dementia Mother      Heart attack Father         Social  She reports that she has never smoked. She has never used smokeless tobacco. She reports that she does not currently use alcohol. She reports that she does not use drugs.      Review of Systems  ROS negative unless stated otherwise in HPI     Objective  /62   Pulse 94   Temp 36.1 °C (97 °F)   Resp 18   Ht 1.626 m (5' 4\")   Wt 56.7 kg (125 lb)   SpO2 96%   BMI 21.46 kg/m²     Physical Exam  Constitutional: Alert, pleasant and interactive, in NAD  Eyes: PERRL, sclera clear, no conjunctival injection  Skin: Warm and dry, no rash or ecchymosis  ENMT: Mucous membranes moist, no lesions noted  Resp: CTAB, even and unlabored  CV: RRR, normal S1, S2, no m,r,g  GI: +BS, soft, round, NT, no rebound tenderness or guarding, no palpable masses or organomegaly  MSK: 5/5 strength, ROM intact, no joint swelling  Extremities: Extremities warm, no edema, contusions, wounds or cyanosis  Neuro: Alert and oriented x3  Psych: Appropriate mood and behavior    Medications  Scheduled medications  busPIRone, 10 mg, oral, BID  citalopram, 20 mg, oral, Daily  hydrOXYzine HCL, 50 mg, oral, Nightly  levothyroxine, 50 mcg, oral, Daily  magnesium oxide, 400 mg, oral, Daily  melatonin, 6 mg, oral, Nightly  pantoprazole, 40 mg, oral, Daily before breakfast  polyethylene glycol, 17 g, oral, Daily  propranolol LA, 60 mg, oral, Daily  simvastatin, 20 mg, oral, Nightly  tamsulosin, 0.8 mg, oral, Nightly      Continuous medications  lactated Ringer's, 100 mL/hr, Last Rate: 100 mL/hr (02/23/24 0621)      PRN medications  PRN medications: ALPRAZolam, ondansetron ODT **OR** ondansetron     Labs  Lab Results   Component Value Date    WBC 6.2 02/23/2024    HGB 9.2 (L) 02/23/2024    HCT 30.2 (L) 02/23/2024    MCV 91 02/23/2024     02/23/2024     Lab Results   Component Value Date    GLUCOSE 88 " "02/23/2024    CALCIUM 9.5 02/23/2024     (L) 02/23/2024    K 3.8 02/23/2024    CO2 18 (L) 02/23/2024     02/23/2024    BUN 47 (H) 02/23/2024    CREATININE 2.58 (H) 02/23/2024     Lab Results   Component Value Date    ALT 7 02/22/2024    AST 16 02/22/2024    ALKPHOS 95 02/22/2024    BILITOT 0.3 02/22/2024     No results found for: \"IRON\", \"TIBC\", \"FERRITIN\"  No results found for: \"INR\", \"PROTIME\"    Radiology  Renal ultrasound 2/22/2024 noting:  IMPRESSION:  No hydronephrosis bilaterally.   Bladder anatomy is not evaluated in detail.  No bladder masses or  stones are identified.  There is no significant postvoid residual.     Assessment:  Demetria Enriquez is a 73 y.o. female presenting with diarrhea.  Symptoms began 10 days ago with 2-3 episodes daily.  Patient is an on very broad bowel regimen for history of constipation.  Last colonoscopy 8 years ago.  Admitted for SONIA after being seen by outpatient GI.    # acute diarrhea  # chronic constipation  # SONIA- improving  # colonic polyps  # GERD    Plan:  - continue supportive care  - diet as tolerated  - follow up stool pathogens, O&P  - obtain KUB  - hold laxatives  - continue home PPI  - would hold Mag OX as this can contribute to diarrhea  - pt will be scheduled for outpt colonoscopy     Plan has been discussed with Dr. Martin. GI will continue to follow peripherally.     Caty Pennington, APRN/CNP     " Patient is a 9y4m old who presents with a chief complaint of dehydration, dysarthria, and ataxia. Prior to hospital admission grandma reports he was developing normally. Since hospital stay pt has had worsening slurred speech and ataxia reported by grandparents. Pt also with progressive agitation and progressive loss of spontaneous movement of LUE. Pt also with intermittent staring spells and intermittent desats secondary to secretions.   Grandmother reports he has not walked or stood since MRI on 5/8 and reported noted decreased strength in L. arm.  Pt able to move UE/LE against gravity, required 2 person assist for sit to stand and ambulation. Patient is a 9y4m old who presents with a chief complaint of dehydration, dysarthria, and ataxia. 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   + intermittent staring spells and intermittent desats secondary to secretions.

## 2024-02-24 LAB
ALBUMIN SERPL BCP-MCNC: 3 G/DL (ref 3.4–5)
ALP SERPL-CCNC: 64 U/L (ref 33–136)
ALT SERPL W P-5'-P-CCNC: 6 U/L (ref 7–45)
ANION GAP SERPL CALC-SCNC: 9 MMOL/L (ref 10–20)
AST SERPL W P-5'-P-CCNC: 12 U/L (ref 9–39)
BILIRUB SERPL-MCNC: 0.2 MG/DL (ref 0–1.2)
BUN SERPL-MCNC: 39 MG/DL (ref 6–23)
C COLI+JEJ+UPSA DNA STL QL NAA+PROBE: NOT DETECTED
CALCIUM SERPL-MCNC: 8.9 MG/DL (ref 8.6–10.3)
CHLORIDE SERPL-SCNC: 104 MMOL/L (ref 98–107)
CO2 SERPL-SCNC: 22 MMOL/L (ref 21–32)
CREAT SERPL-MCNC: 2.37 MG/DL (ref 0.5–1.05)
CREAT UR-MCNC: 32.9 MG/DL (ref 20–320)
EC STX1 GENE STL QL NAA+PROBE: NOT DETECTED
EC STX2 GENE STL QL NAA+PROBE: NOT DETECTED
EGFRCR SERPLBLD CKD-EPI 2021: 21 ML/MIN/1.73M*2
ERYTHROCYTE [DISTWIDTH] IN BLOOD BY AUTOMATED COUNT: 13.7 % (ref 11.5–14.5)
GLUCOSE SERPL-MCNC: 95 MG/DL (ref 74–99)
HCT VFR BLD AUTO: 24.6 % (ref 36–46)
HEMOCCULT SP1 STL QL: NEGATIVE
HGB BLD-MCNC: 8 G/DL (ref 12–16)
MCH RBC QN AUTO: 28 PG (ref 26–34)
MCHC RBC AUTO-ENTMCNC: 32.5 G/DL (ref 32–36)
MCV RBC AUTO: 86 FL (ref 80–100)
NOROVIRUS GI + GII RNA STL NAA+PROBE: NOT DETECTED
NRBC BLD-RTO: 0 /100 WBCS (ref 0–0)
PLATELET # BLD AUTO: 194 X10*3/UL (ref 150–450)
POTASSIUM SERPL-SCNC: 3.4 MMOL/L (ref 3.5–5.3)
PROT SERPL-MCNC: 5.4 G/DL (ref 6.4–8.2)
PROT UR-ACNC: 15 MG/DL (ref 5–24)
PROT/CREAT UR: 0.46 MG/MG CREAT (ref 0–0.17)
RBC # BLD AUTO: 2.86 X10*6/UL (ref 4–5.2)
RV RNA STL NAA+PROBE: NOT DETECTED
SALMONELLA DNA STL QL NAA+PROBE: NOT DETECTED
SHIGELLA DNA SPEC QL NAA+PROBE: NOT DETECTED
SODIUM SERPL-SCNC: 132 MMOL/L (ref 136–145)
V CHOLERAE DNA STL QL NAA+PROBE: NOT DETECTED
WBC # BLD AUTO: 6.9 X10*3/UL (ref 4.4–11.3)
Y ENTEROCOL DNA STL QL NAA+PROBE: NOT DETECTED

## 2024-02-24 PROCEDURE — 2500000004 HC RX 250 GENERAL PHARMACY W/ HCPCS (ALT 636 FOR OP/ED): Performed by: PHYSICIAN ASSISTANT

## 2024-02-24 PROCEDURE — 82270 OCCULT BLOOD FECES: CPT | Performed by: NURSE PRACTITIONER

## 2024-02-24 PROCEDURE — 1200000002 HC GENERAL ROOM WITH TELEMETRY DAILY

## 2024-02-24 PROCEDURE — 2500000002 HC RX 250 W HCPCS SELF ADMINISTERED DRUGS (ALT 637 FOR MEDICARE OP, ALT 636 FOR OP/ED): Performed by: PHYSICIAN ASSISTANT

## 2024-02-24 PROCEDURE — 36415 COLL VENOUS BLD VENIPUNCTURE: CPT | Performed by: FAMILY MEDICINE

## 2024-02-24 PROCEDURE — 2500000002 HC RX 250 W HCPCS SELF ADMINISTERED DRUGS (ALT 637 FOR MEDICARE OP, ALT 636 FOR OP/ED): Performed by: INTERNAL MEDICINE

## 2024-02-24 PROCEDURE — 80053 COMPREHEN METABOLIC PANEL: CPT | Performed by: FAMILY MEDICINE

## 2024-02-24 PROCEDURE — 2500000001 HC RX 250 WO HCPCS SELF ADMINISTERED DRUGS (ALT 637 FOR MEDICARE OP): Performed by: PHYSICIAN ASSISTANT

## 2024-02-24 PROCEDURE — 2500000004 HC RX 250 GENERAL PHARMACY W/ HCPCS (ALT 636 FOR OP/ED): Performed by: INTERNAL MEDICINE

## 2024-02-24 PROCEDURE — 85027 COMPLETE CBC AUTOMATED: CPT | Performed by: PHYSICIAN ASSISTANT

## 2024-02-24 RX ORDER — POTASSIUM CHLORIDE 20 MEQ/1
40 TABLET, EXTENDED RELEASE ORAL DAILY
Status: DISCONTINUED | OUTPATIENT
Start: 2024-02-24 | End: 2024-02-25 | Stop reason: HOSPADM

## 2024-02-24 RX ADMIN — SODIUM BICARBONATE 650 MG: 650 TABLET ORAL at 20:06

## 2024-02-24 RX ADMIN — PANTOPRAZOLE SODIUM 40 MG: 40 TABLET, DELAYED RELEASE ORAL at 06:02

## 2024-02-24 RX ADMIN — BUSPIRONE HYDROCHLORIDE 10 MG: 10 TABLET ORAL at 08:33

## 2024-02-24 RX ADMIN — SODIUM CHLORIDE, POTASSIUM CHLORIDE, SODIUM LACTATE AND CALCIUM CHLORIDE 100 ML/HR: 600; 310; 30; 20 INJECTION, SOLUTION INTRAVENOUS at 13:10

## 2024-02-24 RX ADMIN — SODIUM CHLORIDE, POTASSIUM CHLORIDE, SODIUM LACTATE AND CALCIUM CHLORIDE 100 ML/HR: 600; 310; 30; 20 INJECTION, SOLUTION INTRAVENOUS at 23:15

## 2024-02-24 RX ADMIN — SODIUM CHLORIDE, POTASSIUM CHLORIDE, SODIUM LACTATE AND CALCIUM CHLORIDE 100 ML/HR: 600; 310; 30; 20 INJECTION, SOLUTION INTRAVENOUS at 03:32

## 2024-02-24 RX ADMIN — SODIUM BICARBONATE 650 MG: 650 TABLET ORAL at 08:33

## 2024-02-24 RX ADMIN — Medication 6 MG: at 20:05

## 2024-02-24 RX ADMIN — TAMSULOSIN HYDROCHLORIDE 0.8 MG: 0.4 CAPSULE ORAL at 20:06

## 2024-02-24 RX ADMIN — Medication 400 MG: at 08:33

## 2024-02-24 RX ADMIN — SODIUM BICARBONATE 650 MG: 650 TABLET ORAL at 15:55

## 2024-02-24 RX ADMIN — BUSPIRONE HYDROCHLORIDE 10 MG: 10 TABLET ORAL at 20:06

## 2024-02-24 RX ADMIN — HYDROXYZINE HYDROCHLORIDE 50 MG: 50 TABLET, FILM COATED ORAL at 20:06

## 2024-02-24 RX ADMIN — SIMVASTATIN 20 MG: 20 TABLET, FILM COATED ORAL at 20:06

## 2024-02-24 RX ADMIN — LEVOTHYROXINE SODIUM 50 MCG: 50 TABLET ORAL at 06:02

## 2024-02-24 RX ADMIN — ALPRAZOLAM 0.25 MG: 0.25 TABLET ORAL at 21:32

## 2024-02-24 RX ADMIN — CITALOPRAM HYDROBROMIDE 20 MG: 20 TABLET ORAL at 08:33

## 2024-02-24 RX ADMIN — POTASSIUM CHLORIDE 40 MEQ: 1500 TABLET, EXTENDED RELEASE ORAL at 21:35

## 2024-02-24 ASSESSMENT — COGNITIVE AND FUNCTIONAL STATUS - GENERAL
MOBILITY SCORE: 23
DAILY ACTIVITIY SCORE: 24
CLIMB 3 TO 5 STEPS WITH RAILING: A LITTLE
MOBILITY SCORE: 23
CLIMB 3 TO 5 STEPS WITH RAILING: A LITTLE
DAILY ACTIVITIY SCORE: 24

## 2024-02-24 ASSESSMENT — PAIN - FUNCTIONAL ASSESSMENT: PAIN_FUNCTIONAL_ASSESSMENT: 0-10

## 2024-02-24 ASSESSMENT — PAIN SCALES - GENERAL
PAINLEVEL_OUTOF10: 0 - NO PAIN
PAINLEVEL_OUTOF10: 0 - NO PAIN

## 2024-02-24 NOTE — PROGRESS NOTES
Demetria Enriquez is a 73 y.o. female on day 2 of admission presenting with Acute renal failure, unspecified acute renal failure type (CMS/HCC).    Subjective   Patient states she continues to have liquid stools and passing a lot of gas, less abdominal pain. Appetite good.  Stool tests still pending.       Objective     Last Recorded Vitals  BP 97/60 (BP Location: Right arm, Patient Position: Sitting)   Pulse 84   Temp 36.2 °C (97.2 °F) (Temporal)   Resp 16   Wt 56.7 kg (125 lb)   SpO2 97%   Intake/Output last 3 Shifts:    Intake/Output Summary (Last 24 hours) at 2/24/2024 1332  Last data filed at 2/24/2024 0918  Gross per 24 hour   Intake 120 ml   Output 500 ml   Net -380 ml       Admission Weight  Weight: 60 kg (132 lb 4.4 oz) (02/22/24 1238)    Daily Weight  02/22/24 : 56.7 kg (125 lb)      Physical Exam:  General: Not in acute distress, alert  HEENT: PERRLA, head intact and normocephalic  Neck: Normal to inspection  Lungs: Clear to auscultation, work of breathing within normal limit  Cardiac: Regular rate and rhythm  Abdomen: Soft nontender, positive bowel sounds  : Exam deferred  Skin: Intact  Hematology: No petechia or excessive ecchymosis  Musculoskeletal: Without significant trauma  Neurological: Alert awake oriented, no focal deficit, cranial nerves grossly intact  Psych: No suicidal ideation or homicidal ideation    Relevant Results  Scheduled medications  busPIRone, 10 mg, oral, BID  citalopram, 20 mg, oral, Daily  hydrOXYzine HCL, 50 mg, oral, Nightly  levothyroxine, 50 mcg, oral, Daily  magnesium oxide, 400 mg, oral, Daily  melatonin, 6 mg, oral, Nightly  pantoprazole, 40 mg, oral, Daily before breakfast  polyethylene glycol, 17 g, oral, Daily  propranolol LA, 60 mg, oral, Daily  simvastatin, 20 mg, oral, Nightly  sodium bicarbonate, 650 mg, oral, TID  tamsulosin, 0.8 mg, oral, Nightly      Continuous medications  lactated Ringer's, 100 mL/hr, Last Rate: 100 mL/hr (02/24/24 1310)      PRN  medications  PRN medications: ALPRAZolam, ondansetron ODT **OR** ondansetron   Results for orders placed or performed during the hospital encounter of 02/22/24 (from the past 24 hour(s))   Protein, Urine Random   Result Value Ref Range    Total Protein, Urine Random 15 5 - 24 mg/dL    Creatinine, Urine Random 32.9 20.0 - 320.0 mg/dL    T. Protein/Creatinine Ratio 0.46 (H) 0.00 - 0.17 mg/mg Creat   CBC   Result Value Ref Range    WBC 6.9 4.4 - 11.3 x10*3/uL    nRBC 0.0 0.0 - 0.0 /100 WBCs    RBC 2.86 (L) 4.00 - 5.20 x10*6/uL    Hemoglobin 8.0 (L) 12.0 - 16.0 g/dL    Hematocrit 24.6 (L) 36.0 - 46.0 %    MCV 86 80 - 100 fL    MCH 28.0 26.0 - 34.0 pg    MCHC 32.5 32.0 - 36.0 g/dL    RDW 13.7 11.5 - 14.5 %    Platelets 194 150 - 450 x10*3/uL   Comprehensive metabolic panel   Result Value Ref Range    Glucose 95 74 - 99 mg/dL    Sodium 132 (L) 136 - 145 mmol/L    Potassium 3.4 (L) 3.5 - 5.3 mmol/L    Chloride 104 98 - 107 mmol/L    Bicarbonate 22 21 - 32 mmol/L    Anion Gap 9 (L) 10 - 20 mmol/L    Urea Nitrogen 39 (H) 6 - 23 mg/dL    Creatinine 2.37 (H) 0.50 - 1.05 mg/dL    eGFR 21 (L) >60 mL/min/1.73m*2    Calcium 8.9 8.6 - 10.3 mg/dL    Albumin 3.0 (L) 3.4 - 5.0 g/dL    Alkaline Phosphatase 64 33 - 136 U/L    Total Protein 5.4 (L) 6.4 - 8.2 g/dL    AST 12 9 - 39 U/L    Bilirubin, Total 0.2 0.0 - 1.2 mg/dL    ALT 6 (L) 7 - 45 U/L      Lab Results   Component Value Date    URINECULTURE >100,000 Escherichia coli (A) 12/17/2023       XR abdomen 1 view    Result Date: 2/23/2024  Interpreted By:  Binu Hanson, STUDY: XR ABDOMEN 1 VIEW;  2/23/2024 4:45 pm   INDICATION: Signs/Symptoms:evaluate for ? stool burden.   COMPARISON: CT abdomen pelvis dated 12/18/2023.   ACCESSION NUMBER(S): [id number]   ORDERING CLINICIAN: HANK MORLEY   FINDINGS: Ventral abdominal wall hernia mesh component. Nonobstructive bowel gas pattern. Limited evaluation of pneumoperitoneum on supine imaging, however no gross evidence of free air is noted.  Mild colonic stool burden suggested.   Visualized lungs are clear.   Chronic, non healed fracture deformity of the right obturator ring.       1.  Nonobstructive appearing bowel gas pattern with mild colonic stool burden suggested. 2. Unchanged non healed fracture deformity of the right obturator ring. 3. Interval ventral abdominal wall hernia repair with new mesh component.   MACRO: None   Signed by: Binu Hanson 2/23/2024 4:52 PM Dictation workstation:   [id number]     renal complete    Result Date: 2/22/2024  STUDY: Renal and Bladder Ultrasound; 2/22/2024 3:55 PM INDICATION:  Acute renal failure.  COMPARISON:  CT A/P 12/18/2023, 10/12/2023. ACCESSION NUMBER(S): [id number] ORDERING CLINICIAN: CHAVA LUA TECHNIQUE: Ultrasound of the Kidneys and Bladder.  FINDINGS: RIGHT KIDNEY: The right kidney measures 10.6 cm in length.  Renal cortical echotexture is normal.  There is no hydronephrosis.  There are no stones.  There is a simple cyst within the mid pole measuring 2.1 x 1.0 x 1.8 cm. LEFT KIDNEY: The left kidney measures 10.8 cm in length.  Renal parenchyma is suboptimally visualized due to overlying bowel gas.  There is no hydronephrosis.  There are no stones.  There are no cysts.  BLADDER: The urinary bladder is anechoic.  The distended bladder shows no evidence of wall thickening.  The distended bladder volume is 130 ml. There is no significant post void residual.  Bilateral ureteral jets are visualized.      No hydronephrosis bilaterally. Bladder anatomy is not evaluated in detail.  No bladder masses or stones are identified. There is no significant postvoid residual. Adults and Elderly *Less than 100 ml PVR is considered normal. *Up to 200 ml PVR may be acceptable. *Over 200 ml PVR indicates inadequate emptying. *Over 300 ml is suggestive of urinary retention. *Over 400 ml is considered urinary retention. Children *Over20 ml PVR is considered abnormal but varies with age. Urinary Retention *A PVR  over 500 ml is widely considered abnormal and generally diagnostic of urinary retention.  In addition, itmay be highly predictive of cauda equina syndrome if associated with other neurological findings. Signed by Mook Mishra MD          Assessment/Plan   Demetria Enriquez is a 73 y.o. female on day 2 of admission presenting with Acute renal failure, unspecified acute renal failure type (CMS/HCC).  Principal Problem:    Acute renal failure, unspecified acute renal failure type (CMS/HCC)    SONIA in setting of diarrhea of 2 weeks.   Prerenal related to dehydration,  Creatinine improved slightly from 2.98 to 2.37.  Continued on  ml/hr  Avoid NSAID use.  Diarrhea  Continue IV hydration.  Low residual diet  Possible microscopic colitis from NSAID use.  Consult GI recommends out patient colonoscopy  Anemia  Continue to monitor  Chronic Recurrent Depression in Remission  with Generalized Anxiety Disorder  Continue Buspirone and Citalopram  Insomnia  Continue Hydroxyzine and Melatonin  GERD  Continue PPI  Hypothyroidism  Continue Levothyroxine  HLD  Continue Simvastatin  Urinary Retention  Continue Tamsulosin  Migraine  Continue Propranolol LA for migraine prophylaxis  Chronic idiopathic constipation  Xrays show mild stool burden.      Plan discussed with patient  and daughter Cami at bedside      Ophelia Morris MD

## 2024-02-24 NOTE — CARE PLAN
The patient's goals for the shift include      The clinical goals for the shift include see care plan      Problem: Pain - Adult  Goal: Verbalizes/displays adequate comfort level or baseline comfort level  Outcome: Progressing  Flowsheets (Taken 2/23/2024 1752)  Verbalizes/displays adequate comfort level or baseline comfort level: Encourage patient to monitor pain and request assistance     Problem: Safety - Adult  Goal: Free from fall injury  Outcome: Progressing  Flowsheets (Taken 2/23/2024 1752)  Free from fall injury: Instruct family/caregiver on patient safety     Problem: Discharge Planning  Goal: Discharge to home or other facility with appropriate resources  Outcome: Progressing  Flowsheets (Taken 2/23/2024 1752)  Discharge to home or other facility with appropriate resources: Identify barriers to discharge with patient and caregiver     Problem: Chronic Conditions and Co-morbidities  Goal: Patient's chronic conditions and co-morbidity symptoms are monitored and maintained or improved  Outcome: Progressing  Flowsheets (Taken 2/23/2024 1752)  Care Plan - Patient's Chronic Conditions and Co-Morbidity Symptoms are Monitored and Maintained or Improved: Monitor and assess patient's chronic conditions and comorbid symptoms for stability, deterioration, or improvement     Problem: Fall/Injury  Goal: Not fall by end of shift  Outcome: Progressing  Goal: Be free from injury by end of the shift  Outcome: Progressing  Goal: Verbalize understanding of personal risk factors for fall in the hospital  Outcome: Progressing  Goal: Verbalize understanding of risk factor reduction measures to prevent injury from fall in the home  Outcome: Progressing  Goal: Use assistive devices by end of the shift  Outcome: Progressing  Goal: Pace activities to prevent fatigue by end of the shift  Outcome: Progressing     Problem: Nutrition  Goal: Oral intake greater than 50%  Outcome: Progressing  Goal: Oral intake greater 75%  Outcome:  Progressing  Goal: Adequate PO fluid intake  Outcome: Progressing  Goal: Lab values WNL  Outcome: Progressing  Goal: Electrolytes WNL  Outcome: Progressing  Goal: Maintain stable weight  Outcome: Progressing     EOS note: Pt remained oriented x4 and very pleasant. Ambulated in room with a standby. Continues to be on LR @ 100/hr. Occult blood sample came back negative. Cdiff, giardia, stool pathogen all negative. Pt continues to have loose stools, some improvement in small formed movements at times. Pt has mild pain in abdomen but relieved by passing gas. Continues to eat all meals without concern, encouraged to increase fluid intake, which patient is very good about. Continues to be on RA. NSR with 1st AV block    Pt resting at this time. Bed is in lowest position and locked with alarm on. Call light and personal possesions are within reach.

## 2024-02-24 NOTE — CARE PLAN
The patient's goals for the shift include      The clinical goals for the shift include See plan of care      Problem: Pain - Adult  Goal: Verbalizes/displays adequate comfort level or baseline comfort level  Outcome: Progressing     Problem: Safety - Adult  Goal: Free from fall injury  Outcome: Progressing     Problem: Discharge Planning  Goal: Discharge to home or other facility with appropriate resources  Outcome: Progressing     Problem: Chronic Conditions and Co-morbidities  Goal: Patient's chronic conditions and co-morbidity symptoms are monitored and maintained or improved  Outcome: Progressing     Problem: Fall/Injury  Goal: Not fall by end of shift  Outcome: Progressing  Goal: Be free from injury by end of the shift  Outcome: Progressing  Goal: Verbalize understanding of personal risk factors for fall in the hospital  Outcome: Progressing  Goal: Verbalize understanding of risk factor reduction measures to prevent injury from fall in the home  Outcome: Progressing  Goal: Use assistive devices by end of the shift  Outcome: Progressing  Goal: Pace activities to prevent fatigue by end of the shift  Outcome: Progressing     Problem: Nutrition  Goal: Oral intake greater than 50%  Outcome: Progressing  Goal: Oral intake greater 75%  Outcome: Progressing  Goal: Adequate PO fluid intake  Outcome: Progressing  Goal: Lab values WNL  Outcome: Progressing  Goal: Electrolytes WNL  Outcome: Progressing  Goal: Maintain stable weight  Outcome: Progressing     Patient had two loose liquid bowel movements at start of shift. Patient did give us a urine sample that was sent to lab. Patient vital signs stable. Patient safety maintained.

## 2024-02-25 VITALS
DIASTOLIC BLOOD PRESSURE: 76 MMHG | SYSTOLIC BLOOD PRESSURE: 128 MMHG | TEMPERATURE: 97.3 F | OXYGEN SATURATION: 96 % | BODY MASS INDEX: 21.34 KG/M2 | HEART RATE: 80 BPM | RESPIRATION RATE: 16 BRPM | WEIGHT: 125 LBS | HEIGHT: 64 IN

## 2024-02-25 LAB
ANION GAP SERPL CALC-SCNC: 10 MMOL/L (ref 10–20)
BUN SERPL-MCNC: 30 MG/DL (ref 6–23)
CALCIUM SERPL-MCNC: 9 MG/DL (ref 8.6–10.3)
CHLORIDE SERPL-SCNC: 105 MMOL/L (ref 98–107)
CO2 SERPL-SCNC: 24 MMOL/L (ref 21–32)
CREAT SERPL-MCNC: 1.92 MG/DL (ref 0.5–1.05)
EGFRCR SERPLBLD CKD-EPI 2021: 27 ML/MIN/1.73M*2
ERYTHROCYTE [DISTWIDTH] IN BLOOD BY AUTOMATED COUNT: 13.6 % (ref 11.5–14.5)
GLUCOSE SERPL-MCNC: 100 MG/DL (ref 74–99)
HCT VFR BLD AUTO: 24.6 % (ref 36–46)
HGB BLD-MCNC: 7.9 G/DL (ref 12–16)
MCH RBC QN AUTO: 27.5 PG (ref 26–34)
MCHC RBC AUTO-ENTMCNC: 32.1 G/DL (ref 32–36)
MCV RBC AUTO: 86 FL (ref 80–100)
NRBC BLD-RTO: 0 /100 WBCS (ref 0–0)
PLATELET # BLD AUTO: 186 X10*3/UL (ref 150–450)
POTASSIUM SERPL-SCNC: 3.5 MMOL/L (ref 3.5–5.3)
RBC # BLD AUTO: 2.87 X10*6/UL (ref 4–5.2)
SODIUM SERPL-SCNC: 135 MMOL/L (ref 136–145)
WBC # BLD AUTO: 6 X10*3/UL (ref 4.4–11.3)

## 2024-02-25 PROCEDURE — 2500000004 HC RX 250 GENERAL PHARMACY W/ HCPCS (ALT 636 FOR OP/ED): Performed by: INTERNAL MEDICINE

## 2024-02-25 PROCEDURE — 2500000004 HC RX 250 GENERAL PHARMACY W/ HCPCS (ALT 636 FOR OP/ED): Performed by: PHYSICIAN ASSISTANT

## 2024-02-25 PROCEDURE — 2500000002 HC RX 250 W HCPCS SELF ADMINISTERED DRUGS (ALT 637 FOR MEDICARE OP, ALT 636 FOR OP/ED): Performed by: PHYSICIAN ASSISTANT

## 2024-02-25 PROCEDURE — 82374 ASSAY BLOOD CARBON DIOXIDE: CPT | Performed by: PHYSICIAN ASSISTANT

## 2024-02-25 PROCEDURE — 85027 COMPLETE CBC AUTOMATED: CPT | Performed by: PHYSICIAN ASSISTANT

## 2024-02-25 PROCEDURE — 36415 COLL VENOUS BLD VENIPUNCTURE: CPT | Performed by: PHYSICIAN ASSISTANT

## 2024-02-25 PROCEDURE — 2500000005 HC RX 250 GENERAL PHARMACY W/O HCPCS: Performed by: PHYSICIAN ASSISTANT

## 2024-02-25 PROCEDURE — 2500000001 HC RX 250 WO HCPCS SELF ADMINISTERED DRUGS (ALT 637 FOR MEDICARE OP): Performed by: PHYSICIAN ASSISTANT

## 2024-02-25 RX ORDER — TAMSULOSIN HYDROCHLORIDE 0.4 MG/1
0.4 CAPSULE ORAL NIGHTLY
Qty: 30 CAPSULE | Refills: 0 | Status: SHIPPED | OUTPATIENT
Start: 2024-02-25 | End: 2024-06-03 | Stop reason: SDUPTHER

## 2024-02-25 RX ORDER — SODIUM BICARBONATE 650 MG/1
650 TABLET ORAL 3 TIMES DAILY
Qty: 42 TABLET | Refills: 0 | Status: SHIPPED | OUTPATIENT
Start: 2024-02-25

## 2024-02-25 RX ADMIN — LEVOTHYROXINE SODIUM 50 MCG: 50 TABLET ORAL at 06:16

## 2024-02-25 RX ADMIN — CITALOPRAM HYDROBROMIDE 20 MG: 20 TABLET ORAL at 08:16

## 2024-02-25 RX ADMIN — SODIUM CHLORIDE, POTASSIUM CHLORIDE, SODIUM LACTATE AND CALCIUM CHLORIDE 100 ML/HR: 600; 310; 30; 20 INJECTION, SOLUTION INTRAVENOUS at 08:17

## 2024-02-25 RX ADMIN — BUSPIRONE HYDROCHLORIDE 10 MG: 10 TABLET ORAL at 08:17

## 2024-02-25 RX ADMIN — Medication 400 MG: at 08:16

## 2024-02-25 RX ADMIN — PROPRANOLOL HYDROCHLORIDE 60 MG: 60 CAPSULE, EXTENDED RELEASE ORAL at 08:17

## 2024-02-25 RX ADMIN — SODIUM BICARBONATE 650 MG: 650 TABLET ORAL at 08:17

## 2024-02-25 RX ADMIN — ONDANSETRON 4 MG: 4 TABLET, ORALLY DISINTEGRATING ORAL at 08:09

## 2024-02-25 RX ADMIN — PANTOPRAZOLE SODIUM 40 MG: 40 TABLET, DELAYED RELEASE ORAL at 06:16

## 2024-02-25 ASSESSMENT — PAIN SCALES - GENERAL: PAINLEVEL_OUTOF10: 0 - NO PAIN

## 2024-02-25 NOTE — H&P (VIEW-ONLY)
"Demetria Enriquez is a 73 y.o. female on day 3 of admission presenting with Acute renal failure, unspecified acute renal failure type (CMS/HCC).    Subjective   Feeling improved compared with admission. States her loose stools are considerably decreased, believes she has had 2 bowel movements int he past 24 hours. No abdominal pain. Was able to walk the halls.     Her daughter is at bedside and voice concerns regarding what to be on as an outpatient as a bowel regimen.       Objective     Physical Exam  Constitutional:       General: She is not in acute distress.     Appearance: Normal appearance.   HENT:      Head: Normocephalic and atraumatic.      Mouth/Throat:      Mouth: Mucous membranes are moist.   Eyes:      Extraocular Movements: Extraocular movements intact.   Pulmonary:      Effort: Pulmonary effort is normal.      Breath sounds: No stridor. No wheezing.   Abdominal:      General: Abdomen is flat. There is no distension.   Musculoskeletal:         General: Normal range of motion.   Skin:     General: Skin is warm and dry.   Neurological:      General: No focal deficit present.      Mental Status: She is alert.   Psychiatric:         Mood and Affect: Mood normal.         Judgment: Judgment normal.         Last Recorded Vitals  Blood pressure 128/76, pulse 80, temperature 36.3 °C (97.3 °F), resp. rate 16, height 1.626 m (5' 4\"), weight 56.7 kg (125 lb), SpO2 96 %.  Intake/Output last 3 Shifts:  I/O last 3 completed shifts:  In: 1460 (25.8 mL/kg) [P.O.:360; I.V.:1100 (19.4 mL/kg)]  Out: - (0 mL/kg)   Weight: 56.7 kg     Relevant Results  All relevant imaging and results reviewed         Assessment/Plan   Ms. Hayes is a 73 year old woman who was admitted with acute diarrhea for the past 10 days in setting of hx of constipation. She was found to have SONIA requiring IVF. She improved during her stay with holding her laxatives. Stool studies negative for infection. Suspect she may have had iatrogenic diarrhea " from multiple laxative use (was on miralax, colace and Mg Oxide), however microscopic colitis is also a consideration (would not expect diarrhea to improve with supportive measures, however).    Discussed with daughter and patient that we will proceed to colonoscopy as an outpatient to rule out microscopic colitis. She will continue fiber daily along with Mg Oxide. If she does not have a target of one bowel movements a day will add miralax and titrate as needed. Will hold off on colace at this time.    She was provided our clinic number to reach out to me with any concerns.     Milly Martin MD    I spent 60 minutes in the professional and overall care of this patient.      Milly Martin MD

## 2024-02-25 NOTE — DISCHARGE SUMMARY
Discharge Diagnosis  Acute renal failure, unspecified acute renal failure type (CMS/[facility])    Issues Requiring Follow-Up  Acute kidney injury- follow up with Dr Cortez  New onset anemia  New diarrhea- follow up with  GI physician    Discharge Meds     Your medication list        ASK your doctor about these medications        Instructions Last Dose Given Next Dose Due   ALPRAZolam 0.25 mg tablet  Commonly known as: Xanax           busPIRone 10 mg tablet  Commonly known as: Buspar           calcium carbonate 600 mg calcium (1,500 mg) tablet           cholecalciferol 25 MCG (1000 UT) tablet  Commonly known as: Vitamin D-3           citalopram 20 mg tablet  Commonly known as: CeleXA           docusate sodium 100 mg capsule  Commonly known as: Colace           hydrOXYzine HCL 25 mg tablet  Commonly known as: Atarax           levothyroxine 50 mcg tablet  Commonly known as: Synthroid, Levoxyl           magnesium oxide 500 mg magnesium tablet           melatonin 3 mg tablet           multivitamin with minerals tablet           naproxen 500 mg tablet  Commonly known as: Naprosyn           omeprazole 40 mg DR capsule  Commonly known as: PriLOSEC           polyethylene glycol 17 gram packet  Commonly known as: Glycolax, Miralax           propranolol LA 60 mg 24 hr capsule  Commonly known as: Inderal LA           psyllium 3.4 gram packet  Commonly known as: Metamucil           simvastatin 20 mg tablet  Commonly known as: Zocor           SUMAtriptan 100 mg tablet  Commonly known as: Imitrex           tamsulosin 0.4 mg 24 hr capsule  Commonly known as: Flomax      Take 2 capsules (0.8 mg) by mouth once daily at bedtime.       vitamin E 180 mg (400 unit) capsule                    Test Results Pending At Discharge  Pending Labs       Order Current Status    Cryptosporidium Antigen, Stool In process    Giardia Antigen In process    Ova/Para + Giardia/Cryptosporidium Antigen In process    Serum Protein Electrophoresis +  "Immunofixation In process    Serum Protein Electrophoresis + Immunofixation In process            Hospital Course     Expand All Collapse All    JSubjective   Demetria Enriquez is a 73 y.o. female with medical history of migraines, GERD, hypothyroidism, depression, anxiety, arthritis, HLD, and macular degeneration presenting to [facility] on 2/22/2024 with complaints of diarrhea. Patient reports on  2/13/24 she began having 3-4 episodes of non-bloody diarrhea described as a \"pudding\" texture and a dark brown color. Patient had sharp lower left sided abdomen associated with the pain which resolved on its own 2 days ago. Patient has had associated intermittent nausea and has felt weaker over the past few days. Daughter notes patients blood pressure has been lower than normal with the lowest being recorded around 97/60s. Daughter notes that patient has had increase in hiccups and burping over the past few days as well.  Patient was initially taking imodium, which was recommended to be stopped by her gastroenterologist outpatient. Of note patient has been taking 500 mg of Naproxen 1-2x daily since September this year, denies any use of Aleve or other NSAIDs. Patient states last bowel movement was at 2000 yesterday, she has been passing gas and is not experiencing any abdominal pain currently.     Patient was seen outpatient by her NP Gastroenterologist who did lab work and found patient to have an acute SONIA and recommended an ED visit. In ED, patient found to have an acute SONIA with Cr elevated to 2.98, baseline 0.59. CRP and sed rate mildly elevated. CBC unremarkable. Stool cultures ordered and were negative for infectious etiology.  ED gave patient 1L of fluids. And this was continued with lactated ringers on the floor.     Patient denies any recent antibiotic use, travel outside of the country, new foods, or restaurants, or people in household with similar symptoms.           Objective   Last Recorded Vitals  BP 97/60 (BP " Location: Right arm, Patient Position: Sitting)   Pulse 84   Temp 36.2 °C (97.2 °F) (Temporal)   Resp 16   Wt 56.7 kg (125 lb)   SpO2 97%   Intake/Output last 3 Shifts:     Intake/Output Summary (Last 24 hours) at 2/24/2024 1332  Last data filed at 2/24/2024 0918      Gross per 24 hour   Intake 120 ml   Output 500 ml   Net -380 ml         Admission Weight  Weight: 60 kg (132 lb 4.4 oz) (02/22/24 1238)     Daily Weight  02/22/24 : 56.7 kg (125 lb)        Physical Exam:  General: Not in acute distress, alert  HEENT: PERRLA, head intact and normocephalic  Neck: Normal to inspection  Lungs: Clear to auscultation, work of breathing within normal limit  Cardiac: Regular rate and rhythm  Abdomen: Soft nontender, positive bowel sounds  : Exam deferred  Skin: Intact  Hematology: No petechia or excessive ecchymosis  Musculoskeletal: Without significant trauma  Neurological: Alert awake oriented, no focal deficit, cranial nerves grossly intact  Psych: No suicidal ideation or homicidal ideation     Relevant Results  Scheduled medications  busPIRone, 10 mg, oral, BID  citalopram, 20 mg, oral, Daily  hydrOXYzine HCL, 50 mg, oral, Nightly  levothyroxine, 50 mcg, oral, Daily  magnesium oxide, 400 mg, oral, Daily  melatonin, 6 mg, oral, Nightly  pantoprazole, 40 mg, oral, Daily before breakfast  polyethylene glycol, 17 g, oral, Daily  propranolol LA, 60 mg, oral, Daily  simvastatin, 20 mg, oral, Nightly  sodium bicarbonate, 650 mg, oral, TID  tamsulosin, 0.8 mg, oral, Nightly        Continuous medications  lactated Ringer's, 100 mL/hr, Last Rate: 100 mL/hr (02/24/24 1310)        PRN medications  PRN medications: ALPRAZolam, ondansetron ODT **OR** ondansetron         Results for orders placed or performed during the hospital encounter of 02/22/24 (from the past 24 hour(s))   Protein, Urine Random   Result Value Ref Range     Total Protein, Urine Random 15 5 - 24 mg/dL     Creatinine, Urine Random 32.9 20.0 - 320.0 mg/dL     T.  Protein/Creatinine Ratio 0.46 (H) 0.00 - 0.17 mg/mg Creat   CBC   Result Value Ref Range     WBC 6.9 4.4 - 11.3 x10*3/uL     nRBC 0.0 0.0 - 0.0 /100 WBCs     RBC 2.86 (L) 4.00 - 5.20 x10*6/uL     Hemoglobin 8.0 (L) 12.0 - 16.0 g/dL     Hematocrit 24.6 (L) 36.0 - 46.0 %     MCV 86 80 - 100 fL     MCH 28.0 26.0 - 34.0 pg     MCHC 32.5 32.0 - 36.0 g/dL     RDW 13.7 11.5 - 14.5 %     Platelets 194 150 - 450 x10*3/uL   Comprehensive metabolic panel   Result Value Ref Range     Glucose 95 74 - 99 mg/dL     Sodium 132 (L) 136 - 145 mmol/L     Potassium 3.4 (L) 3.5 - 5.3 mmol/L     Chloride 104 98 - 107 mmol/L     Bicarbonate 22 21 - 32 mmol/L     Anion Gap 9 (L) 10 - 20 mmol/L     Urea Nitrogen 39 (H) 6 - 23 mg/dL     Creatinine 2.37 (H) 0.50 - 1.05 mg/dL     eGFR 21 (L) >60 mL/min/1.73m*2     Calcium 8.9 8.6 - 10.3 mg/dL     Albumin 3.0 (L) 3.4 - 5.0 g/dL     Alkaline Phosphatase 64 33 - 136 U/L     Total Protein 5.4 (L) 6.4 - 8.2 g/dL     AST 12 9 - 39 U/L     Bilirubin, Total 0.2 0.0 - 1.2 mg/dL     ALT 6 (L) 7 - 45 U/L            Lab Results   Component Value Date     URINECULTURE >100,000 Escherichia coli (A) 12/17/2023         XR abdomen 1 view     Result Date: 2/23/2024  Interpreted By:  Binu Hanson, STUDY: XR ABDOMEN 1 VIEW;  2/23/2024 4:45 pm   INDICATION: Signs/Symptoms:evaluate for ? stool burden.   COMPARISON: CT abdomen pelvis dated 12/18/2023.   ACCESSION NUMBER(S): [id number]   ORDERING CLINICIAN: HANK MORLEY   FINDINGS: Ventral abdominal wall hernia mesh component. Nonobstructive bowel gas pattern. Limited evaluation of pneumoperitoneum on supine imaging, however no gross evidence of free air is noted. Mild colonic stool burden suggested.   Visualized lungs are clear.   Chronic, non healed fracture deformity of the right obturator ring.        1.  Nonobstructive appearing bowel gas pattern with mild colonic stool burden suggested. 2. Unchanged non healed fracture deformity of the right obturator ring.  3. Interval ventral abdominal wall hernia repair with new mesh component.   MACRO: None   Signed by: Binu Hanson 2/23/2024 4:52 PM Dictation workstation:   [id number]     US renal complete     Result Date: 2/22/2024  STUDY: Renal and Bladder Ultrasound; 2/22/2024 3:55 PM INDICATION:  Acute renal failure.  COMPARISON:  CT A/P 12/18/2023, 10/12/2023. ACCESSION NUMBER(S): [id number] ORDERING CLINICIAN: CHAVA LUA TECHNIQUE: Ultrasound of the Kidneys and Bladder.  FINDINGS: RIGHT KIDNEY: The right kidney measures 10.6 cm in length.  Renal cortical echotexture is normal.  There is no hydronephrosis.  There are no stones.  There is a simple cyst within the mid pole measuring 2.1 x 1.0 x 1.8 cm. LEFT KIDNEY: The left kidney measures 10.8 cm in length.  Renal parenchyma is suboptimally visualized due to overlying bowel gas.  There is no hydronephrosis.  There are no stones.  There are no cysts.  BLADDER: The urinary bladder is anechoic.  The distended bladder shows no evidence of wall thickening.  The distended bladder volume is 130 ml. There is no significant post void residual.  Bilateral ureteral jets are visualized.       No hydronephrosis bilaterally. Bladder anatomy is not evaluated in detail.  No bladder masses or stones are identified. There is no significant postvoid residual. Adults and Elderly *Less than 100 ml PVR is considered normal. *Up to 200 ml PVR may be acceptable. *Over 200 ml PVR indicates inadequate emptying. *Over 300 ml is suggestive of urinary retention. *Over 400 ml is considered urinary retention. Children *Over20 ml PVR is considered abnormal but varies with age. Urinary Retention *A PVR over 500 ml is widely considered abnormal and generally diagnostic of urinary retention.  In addition, itmay be highly predictive of cauda equina syndrome if associated with other neurological findings. Signed by Mook Mishra MD                 Assessment/Plan   During the hospital stay she was  given several liters of IV Ringers lactate and Creatinine gradually improved to 1.98  Principal Problem:    Acute renal failure, unspecified acute renal failure type (CMS/HCC)     SONIA in setting of diarrhea of 2 weeks.   Prerenal related to dehydration,  Creatinine improved slightly from 2.98 to 1.98.  Avoid NSAID use.  Diarrhea  Continued on IV hydration and oral hydration.   Diarrhea slowed down.  Stool studies negative for infectious etiology.  Low residual diet started  Possible microscopic colitis from NSAID use.  Consult GI recommends out patient colonoscopy  Anemia  Continue to monitor as dropped to 7.9.  Chronic Recurrent Depression in Remission  with Generalized Anxiety Disorder  Continue Buspirone and Citalopram  Insomnia  Continue Hydroxyzine and Melatonin  GERD  Continue PPI  Hypothyroidism  Continue Levothyroxine  HLD  Continue Simvastatin  Urinary Retention  Continue Tamsulosin  Migraine  Continue Propranolol LA for migraine prophylaxis  Chronic idiopathic constipation  Xrays show mild stool burden.   Poor pelvic muscle control  To do out patient Pelvic floor physical therapy.       Pertinent Physical Exam At Time of Discharge  Physical Exam  Cardiovascular:      Rate and Rhythm: Normal rate.   Pulmonary:      Effort: Pulmonary effort is normal.      Breath sounds: Normal breath sounds.   Abdominal:      General: Abdomen is flat. Bowel sounds are normal.   Neurological:      Mental Status: She is alert.         Outpatient Follow-Up  Future Appointments   Date Time Provider Department Center   3/19/2024 11:30 AM Ally Srivastava, PT [id number] [facility]   3/28/2024 12:30 PM Ally Srivastava, PT [id number] [facility]   4/4/2024 12:30 PM Ally Srivastava, PT [id number] [facility]   4/11/2024 12:30 PM Ally Srivastava PT [id number] [facility]   4/18/2024 12:30 PM Ally Srivastava PT [id number] [facility]   7/15/2024  1:00 PM Tuyet Wahl, APRN-CNP [id number] [facility]         Ophelia Morris MD

## 2024-02-25 NOTE — NURSING NOTE
2005 - Handoff report given at bedside.  Pt. Is observed sitting up in bed comfortably. Pt. Denies pain and discomfort.  She states that she at times has gas pain however, it is intermittent and resolves with flatus.   Pt. Still having incontinent stool due to urgency.  Pt. Skin is clear and intact.  While asking assessment questions, Pt. Decided to share current life stressors including her  having a stroke one week ago and expressed him having difficulty remembering.   Pt. Became tearful and emotional.  States she will take PRN Xanax, and muscle relaxer.  Redirection used with visualization and deep breathing exercise to calm pt.       2130- Pt. Requesting PRN Xanax for anxiety.  Dose is 0.25mg.  Pt. Tolerated well.   Comfort measures offered.   Pt. Up to BR and returned to bed with one assist.     0300 - Pt. Up to BR.  Incontinent of stool.  Returned to bed.    0400 -  Pt. Requesting PRN Xanax.  Pt. Then changed her mind and states she does not want at this time.    0510 -  Pt. Up to the BR again.      0645 - Pt. Sitting at bedside and expressed nausea after sitting up and returning to bed from BR.

## 2024-02-25 NOTE — PROGRESS NOTES
INPATIENT NEPHROLOGY CONSULT PROGRESS NOTE      Patient Name: Demetria Enriquez MRN: [id number]  DATE of SERVICE: February 24, 2024  TIME of SERVICE: 10:42 AM  CONSULTING SERVICE: Nephrology    REASON for CONSULT: SONIA    SUBJECTIVE:  Seen and evaluated at bedside patient was sitting at the edge of the bed eating lunch.  Had Doppler at bedside.  Hide renal parameters improving serum creatinine down to 2.3 mg deciliter sodium level up to 132 from 132.  Potassium low today 3.4 to give 40 mill equivalent KCl.  Protein to creatinine ratio minimal 0.6  Stool for C. difficile unremarkable  Stool pathogen panel negative    Summary of stay:  Ms. Enriquez is a 73 y.o. female who presented to [facility] February 22, 2024 for evaluation of diarrhea.  Patient reports that diarrhea started February 13, 2024 about 3-4 bowel movements a day diarrhea associated with sharp lower left abdominal pain.  Patient with past medical history significant for relatively preserved kidney function, hypothyroidism, migraine headache, depression, arthritis, hyperlipidemia.   Labs demonstrated acute kidney injury with serum creatinine up to 2.89 mg deciliter from baseline creatinine of 0.5 mg/dL, GFR of 69 mill per minute from baseline GFR more than 90.  Azotemia with BUN of 52, hyponatremia sodium of 130 hypercalcemia calcium 10.6  Kidney ultrasound without hydronephrosis.  Right kidney cyst noted.  Patient had a CT scan #17 2020 hepatic lobe cyst stable defect in the midline abdominal wall 4.9 x 2.2 cm and 6 x 2.1 cm fat-containing anterior abdominal hernias.  Moderate to large amount of stool throughout the colon also evidence of fecalization of the small bowel loop  Fracture of the right superior pubic ramus subacute fracture of right inferior pubic ramus.  Bilateral subacute sacral kelly fracture with sclerosis of the sacrum.        ASSESSMENT AND PLAN:  Acute kidney injury in  the setting of profound diarrhea for 2 weeks:  -- SONIA likely prerenal due to dehydration, NSAIDs related, analgesic nephropathy  --Admission serum creatinine to 2.9 mg deciliter with drop in GFR to 16 ml  --No uremic symptoms  -- No signs of obstruction on renal ultrasound.  -- pubic ramus and sacral fractures, anemia, hypercalcemia: To rule out plasma cell disorder. Pending SPEP         Diarrhea:  Concerning for overflow incontinent  Recent CT scan December 17 demonstrated severe constipation  Infectious workup in process     Right kidney cyst appears to be benign     Volume: Dehydration, hypovolemia     Electrolytes: Hyponatremia likely hypovolemic     Proteinuria detected on urinalysis to quantify protein to creatinine ratio     Hypercalcemia likely secondary to dehydration     Associated conditions: Patient with history of urinary retention, anxiety, migraine headache, GERD, hypothyroidism.     Plan:  Acute kidney injury in the setting of acute diarrhea likely prerenal secondary to dehydration,   Send images demonstrated severe constipation which raises a concern for diarrhea related to overflow; workup for infectious disease in process: improving     Renal parameters continue to improve serum creatinine down to 2.3 from 2.6 yesterday.  BUN down to 39 from 47.  Sodium level up to 132 from 30.  To cont IV fluid to  mL/h and adjust based on insensible losses.  To give 40 mEq of KCl  No signs of volume overload  To Continue to avoid NSAIDs.     Medication reviewed, adjusted, we will continue to monitor closely with you, thank you!    Medications:    Current Facility-Administered Medications:     ALPRAZolam (Xanax) tablet 0.25 mg, 0.25 mg, oral, Daily PRN, NADJA CramerC, 0.25 mg at 02/23/24 2154    busPIRone (Buspar) tablet 10 mg, 10 mg, oral, BID, NADJA CramerC, 10 mg at 02/24/24 2006    citalopram (CeleXA) tablet 20 mg, 20 mg, oral, Daily, Nelli Finley PA-C, 20 mg at 02/24/24 0820     hydrOXYzine HCL (Atarax) tablet 50 mg, 50 mg, oral, Nightly, OSCAR Cramer-C, 50 mg at 02/24/24 2006    lactated Ringer's infusion, 100 mL/hr, intravenous, Continuous, Lindsay Cortez MD, Last Rate: 100 mL/hr at 02/24/24 1310, 100 mL/hr at 02/24/24 1310    levothyroxine (Synthroid, Levoxyl) tablet 50 mcg, 50 mcg, oral, Daily, OSCAR Cramer-C, 50 mcg at 02/24/24 0602    magnesium oxide (Mag-Ox) tablet 400 mg, 400 mg, oral, Daily, OSCAR Cramer-C, 400 mg at 02/24/24 0833    melatonin tablet 6 mg, 6 mg, oral, Nightly, OSCAR Cramer-C, 6 mg at 02/24/24 2005    ondansetron ODT (Zofran-ODT) disintegrating tablet 4 mg, 4 mg, oral, q8h PRN **OR** ondansetron (Zofran) injection 4 mg, 4 mg, intravenous, q8h PRN, Nelli Finley PA-C    pantoprazole (ProtoNix) EC tablet 40 mg, 40 mg, oral, Daily before breakfast, OSCAR Cramer-C, 40 mg at 02/24/24 0602    polyethylene glycol (Glycolax, Miralax) packet 17 g, 17 g, oral, Daily, Nelli Finley PA-C    propranolol LA (Inderal LA) 24 hr capsule 60 mg, 60 mg, oral, Daily, Nelli Finley PA-C, 60 mg at 02/23/24 0917    simvastatin (Zocor) tablet 20 mg, 20 mg, oral, Nightly, Nelli Finley PA-C, 20 mg at 02/24/24 2006    sodium bicarbonate tablet 650 mg, 650 mg, oral, TID, Lindsay Cortez MD, 650 mg at 02/24/24 2006    tamsulosin (Flomax) 24 hr capsule 0.8 mg, 0.8 mg, oral, Nightly, OSCAR Cramer-C, 0.8 mg at 02/24/24 2006    PERTINENT ROS:  GENERAL:  positive for fatigue, poor appetite.  No fever/chills  RESPIRATORY:  positive for shortness of breath.  Negative for cough, wheezing.  CARDIOVASCULAR:   Negative for chest pain or palpitation.  GI:  + for abdominal pain, diarrhea, heartburn, nausea, vomiting  : negative for dysuria, hematuria    Physical Exam:  Vital signs in last 24 hours:  Temp:  [36.2 °C (97.2 °F)-37.5 °C (99.5 °F)] 36.5 °C (97.7 °F)  Heart Rate:  [79-86] 86  Resp:  [16-18] 16  BP: ()/(60-75) 121/75    General: Awake, cooperative,  not in acute distress  HEENT:  NCAT,  mucous membranes moist and pink  NECK:  no JVD, no carotid bruit, supple, no cervical mass or thyromegaly  LUNGS;  Diminished breath sounds, no Rales  CV:  Distant, regular rate and rhythm, no murmurs  ABDOMEN:  abdomen soft, nontender, BS normal, no masses or organomegaly  EDEMA:  no lower extremity edema/dependent edema  SKIN:  dry and normal turgor, no clubbing, cyanosis or petechia.  No rashes noted      Intake/Output last 3 shifts:  I/O last 3 completed shifts:  In: 360 (6.3 mL/kg) [P.O.:360]  Out: 500 (8.8 mL/kg) [Stool:500]  Weight: 56.7 kg     DATA:  Diagnotic tests reviewed for Todays visit:  Results from last 7 days   Lab Units 02/24/24  0648   WBC AUTO x10*3/uL 6.9   RBC AUTO x10*6/uL 2.86*   HEMOGLOBIN g/dL 8.0*   HEMATOCRIT % 24.6*       Results from last 7 days   Lab Units 02/24/24  0648 02/23/24  0537 02/22/24  0929   SODIUM mmol/L 132*   < > 130*   POTASSIUM mmol/L 3.4*   < > 3.8   CHLORIDE mmol/L 104   < > 98   CO2 mmol/L 22   < > 21   BUN mg/dL 39*   < > 52*   CREATININE mg/dL 2.37*   < > 2.98*   CALCIUM mg/dL 8.9   < > 10.6*   MAGNESIUM mg/dL  --   --  2.26   BILIRUBIN TOTAL mg/dL 0.2  --  0.3   ALT U/L 6*  --  7   AST U/L 12  --  16    < > = values in this interval not displayed.       Results from last 7 days   Lab Units 02/22/24  1343   COLOR U  Yellow   APPEARANCE U  Hazy*   PH U  5.0   SPEC GRAV UR  1.016   PROTEIN U mg/dL 30 (1+)*   BLOOD UR  NEGATIVE   NITRITE U  NEGATIVE   WBC UR /HPF 1-5   BACTERIA UR /HPF 1+*       IMAGING: CXR reviewed in  images      SIGNATURE: Lindsay Cortez MD  Nephrology and Hypertension  [address] [facility] [place]. [address]  Office phone: [phone]  FAX: [phone]    This note was partially generated using the Dragon voice recognition system, and there may be some incorrect words, spelling's and punctuation that were not noted in checking the note before saving.

## 2024-02-26 DIAGNOSIS — Z12.11 COLON CANCER SCREENING: ICD-10-CM

## 2024-02-26 LAB
CRYPTOSP AG STL QL IA: NEGATIVE
G LAMBLIA AG STL QL IA: NEGATIVE

## 2024-02-26 RX ORDER — POLYETHYLENE GLYCOL 3350, SODIUM CHLORIDE, SODIUM BICARBONATE, POTASSIUM CHLORIDE 420; 11.2; 5.72; 1.48 G/4L; G/4L; G/4L; G/4L
4000 POWDER, FOR SOLUTION ORAL ONCE
Qty: 4000 ML | Refills: 0 | Status: SHIPPED | OUTPATIENT
Start: 2024-02-26 | End: 2024-02-26

## 2024-02-27 ENCOUNTER — TELEPHONE (OUTPATIENT)
Dept: GASTROENTEROLOGY | Facility: CLINIC | Age: 74
End: 2024-02-27
Payer: MEDICARE

## 2024-02-27 NOTE — TELEPHONE ENCOUNTER
Pt called into nurses line with concerns about incontinence with stool on the way to her colonoscopy scheduled 2/28 with Dr. Cortez . Provided assurance to patient .

## 2024-02-28 ENCOUNTER — ANESTHESIA (OUTPATIENT)
Dept: GASTROENTEROLOGY | Facility: HOSPITAL | Age: 74
End: 2024-02-28
Payer: MEDICARE

## 2024-02-28 ENCOUNTER — ANESTHESIA EVENT (OUTPATIENT)
Dept: GASTROENTEROLOGY | Facility: HOSPITAL | Age: 74
End: 2024-02-28
Payer: MEDICARE

## 2024-02-28 ENCOUNTER — HOSPITAL ENCOUNTER (OUTPATIENT)
Dept: GASTROENTEROLOGY | Facility: HOSPITAL | Age: 74
Setting detail: OUTPATIENT SURGERY
Discharge: HOME | End: 2024-02-28
Payer: MEDICARE

## 2024-02-28 VITALS
SYSTOLIC BLOOD PRESSURE: 136 MMHG | BODY MASS INDEX: 21.85 KG/M2 | TEMPERATURE: 97.9 F | RESPIRATION RATE: 16 BRPM | WEIGHT: 128 LBS | HEIGHT: 64 IN | OXYGEN SATURATION: 97 % | HEART RATE: 65 BPM | DIASTOLIC BLOOD PRESSURE: 64 MMHG

## 2024-02-28 DIAGNOSIS — R19.7 DIARRHEA, UNSPECIFIED TYPE: Primary | ICD-10-CM

## 2024-02-28 PROCEDURE — 3700000002 HC GENERAL ANESTHESIA TIME - EACH INCREMENTAL 1 MINUTE: Performed by: INTERNAL MEDICINE

## 2024-02-28 PROCEDURE — A45380 PR COLONOSCOPY,BIOPSY: Performed by: NURSE ANESTHETIST, CERTIFIED REGISTERED

## 2024-02-28 PROCEDURE — 2500000004 HC RX 250 GENERAL PHARMACY W/ HCPCS (ALT 636 FOR OP/ED): Performed by: NURSE ANESTHETIST, CERTIFIED REGISTERED

## 2024-02-28 PROCEDURE — A45380 PR COLONOSCOPY,BIOPSY: Performed by: STUDENT IN AN ORGANIZED HEALTH CARE EDUCATION/TRAINING PROGRAM

## 2024-02-28 PROCEDURE — 88305 TISSUE EXAM BY PATHOLOGIST: CPT | Mod: TC,STJLAB,WESLAB | Performed by: INTERNAL MEDICINE

## 2024-02-28 PROCEDURE — 45380 COLONOSCOPY AND BIOPSY: CPT | Performed by: INTERNAL MEDICINE

## 2024-02-28 PROCEDURE — 88305 TISSUE EXAM BY PATHOLOGIST: CPT | Performed by: PATHOLOGY

## 2024-02-28 PROCEDURE — 99100 ANES PT EXTEME AGE<1 YR&>70: CPT | Performed by: STUDENT IN AN ORGANIZED HEALTH CARE EDUCATION/TRAINING PROGRAM

## 2024-02-28 PROCEDURE — 3700000001 HC GENERAL ANESTHESIA TIME - INITIAL BASE CHARGE: Performed by: INTERNAL MEDICINE

## 2024-02-28 PROCEDURE — 7100000009 HC PHASE TWO TIME - INITIAL BASE CHARGE: Performed by: INTERNAL MEDICINE

## 2024-02-28 PROCEDURE — 7100000010 HC PHASE TWO TIME - EACH INCREMENTAL 1 MINUTE: Performed by: INTERNAL MEDICINE

## 2024-02-28 RX ORDER — PROPOFOL 10 MG/ML
INJECTION, EMULSION INTRAVENOUS CONTINUOUS PRN
Status: DISCONTINUED | OUTPATIENT
Start: 2024-02-28 | End: 2024-02-28

## 2024-02-28 RX ORDER — SODIUM CHLORIDE, SODIUM LACTATE, POTASSIUM CHLORIDE, CALCIUM CHLORIDE 600; 310; 30; 20 MG/100ML; MG/100ML; MG/100ML; MG/100ML
20 INJECTION, SOLUTION INTRAVENOUS CONTINUOUS
Status: DISCONTINUED | OUTPATIENT
Start: 2024-02-28 | End: 2024-02-29 | Stop reason: HOSPADM

## 2024-02-28 RX ADMIN — SODIUM CHLORIDE, SODIUM LACTATE, POTASSIUM CHLORIDE, AND CALCIUM CHLORIDE: 600; 310; 30; 20 INJECTION, SOLUTION INTRAVENOUS at 13:14

## 2024-02-28 RX ADMIN — PROPOFOL 200 MCG/KG/MIN: 10 INJECTION, EMULSION INTRAVENOUS at 13:23

## 2024-02-28 ASSESSMENT — PAIN SCALES - GENERAL
PAINLEVEL_OUTOF10: 0 - NO PAIN

## 2024-02-28 ASSESSMENT — PAIN - FUNCTIONAL ASSESSMENT
PAIN_FUNCTIONAL_ASSESSMENT: 0-10

## 2024-02-28 NOTE — ANESTHESIA POSTPROCEDURE EVALUATION
Patient: Demetria Enriquez    Procedure Summary       Date: 02/28/24 Room / Location: [facility]    Anesthesia Start: 1314 Anesthesia Stop: 1351    Procedure: COLONOSCOPY Diagnosis:       Diarrhea, unspecified type      Diarrhea, unspecified type    Scheduled Providers: Lisa Cortez MD Responsible Provider: Melvi Leal MD    Anesthesia Type: MAC ASA Status: 3            Anesthesia Type: MAC    Vitals Value Taken Time   /64 02/28/24 1420   Temp 36.6 °C (97.9 °F) 02/28/24 1355   Pulse 65 02/28/24 1420   Resp 16 02/28/24 1420   SpO2 97 % 02/28/24 1355       Anesthesia Post Evaluation    Patient location during evaluation: PACU  Patient participation: complete - patient participated  Level of consciousness: awake and alert  Pain management: adequate  Airway patency: patent  Cardiovascular status: acceptable  Respiratory status: acceptable  Hydration status: acceptable  Postoperative Nausea and Vomiting: none        No notable events documented.

## 2024-02-28 NOTE — DISCHARGE INSTRUCTIONS
Patient Instructions after a Colonoscopy      The anesthetics, sedatives or narcotics which were given to you today will be acting in your body for the next 24 hours, so you might feel a little sleepy or groggy.  This feeling should slowly wear off. Carefully read and follow the instructions.     You received sedation today:  - Do not drive or operate any machinery or power tools of any kind.   - No alcoholic beverages today, not even beer or wine.  - Do not make any important decisions or sign any legal documents.  - No over the counter medications that contain alcohol or that may cause drowsiness.  - Do not make any important decisions or sign any legal documents.    While it is common to experience mild to moderate abdominal distention, gas, or belching after your procedure, if any of these symptoms occur following discharge from the GI Lab or within one week of having your procedure, call the [facility] to be advised whether a visit to your nearest Urgent Care or Emergency Department is indicated.  Take this paper with you if you go.     - If you develop an allergic reaction to the medications that were given during your procedure such as difficulty breathing, rash, hives, severe nausea, vomiting or lightheadedness.  - If you experience chest pain, shortness of breath, severe abdominal pain, fevers and chills.  -If you develop signs and symptoms of bleeding such as blood in your spit, if your stools turn black, tarry, or bloody  - If you have not urinated within 8 hours following your procedure.  - If your IV site becomes painful, red, inflamed, or looks infected.    If you received a biopsy/polypectomy/sphincterotomy the following instructions apply below:    __ Do not use Aspirin containing products, non-steroidal medications or anti-coagulants for one week following your procedure. (Examples of these types of medications are: Advil, Arthrotec, Aleve, Coumadin, Ecotrin, Heparin, Ibuprofen,  Indocin, Motrin, Naprosyn, Nuprin, Plavix, Vioxx, and Voltarin, or their generic forms.  This list is not all-inclusive.  Check with your physician or pharmacist before resuming medications.)   __ Eat a soft diet today.  Avoid foods that are poorly digested for the next 24 hours.  These foods would include: nuts, beans, lettuce, red meats, and fried foods. Start with liquids and advance your diet as tolerated, gradually work up to eating solids.   __ Do not have a Barium Study or Enema for one week.    Your physician recommends the additional following instructions:    -You have a contact number available for emergencies. The signs and symptoms of potential delayed complications were discussed with you. You may return to normal activities tomorrow.  -Resume your previous diet.  -Continue your present medications.   -We are waiting for your pathology results.  -Your physician has recommended a repeat colonoscopy (date to be determined after pending pathology results are reviewed) for surveillance based on pathology results.  -The findings and recommendations have been discussed with you.  -The findings and recommendations were discussed with your family.  - Please see Medication Reconciliation Form for new medication/medications prescribed.       If you experience any problems or have any questions following discharge from the GI Lab, please call:        Nurse Signature                                                                        Date___________________                                                                            Patient/Responsible Party Signature                                        Date___________________

## 2024-02-28 NOTE — ANESTHESIA PREPROCEDURE EVALUATION
Patient: Demetria Enriquez    Procedure Information       Date/Time: 02/28/24 1300    Scheduled providers: Lisa Cortez MD    Procedure: COLONOSCOPY    Location: [facility]            Relevant Problems   /Renal   (+) Acute UTI (urinary tract infection)   (+) Acute renal failure, unspecified acute renal failure type (CMS/HCC)      Musculoskeletal   (+) Localized, primary osteoarthritis   (+) Lumbosacral spondylosis without myelopathy      Infectious Disease   (+) Acute UTI (urinary tract infection)       Clinical information reviewed:    Allergies  Meds               NPO Detail:  No data recorded     Physical Exam    Airway  Mallampati: II  TM distance: >3 FB  Neck ROM: full     Cardiovascular   Rhythm: regular  Rate: normal     Dental    Pulmonary   Breath sounds clear to auscultation     Abdominal   Abdomen: soft             Anesthesia Plan    History of general anesthesia?: yes  History of complications of general anesthesia?: yes    ASA 3     MAC   (History of slow emergence from GA.)  intravenous induction   Postoperative administration of opioids is intended.  Anesthetic plan and risks discussed with patient.    Plan discussed with CAA, CRNA and attending.

## 2024-02-29 LAB
ALBUMIN: 3.5 G/DL (ref 3.4–5)
ALPHA 1 GLOBULIN: 0.4 G/DL (ref 0.2–0.6)
ALPHA 2 GLOBULIN: 0.8 G/DL (ref 0.4–1.1)
BETA GLOBULIN: 0.6 G/DL (ref 0.5–1.2)
GAMMA GLOBULIN: 0.9 G/DL (ref 0.5–1.4)
IMMUNOFIXATION COMMENT: NORMAL
PATH REVIEW - SERUM IMMUNOFIXATION: NORMAL
PATH REVIEW-SERUM PROTEIN ELECTROPHORESIS: NORMAL
PROTEIN ELECTROPHORESIS COMMENT: NORMAL

## 2024-03-01 ENCOUNTER — TELEPHONE (OUTPATIENT)
Dept: GASTROENTEROLOGY | Facility: CLINIC | Age: 74
End: 2024-03-01
Payer: MEDICARE

## 2024-03-01 NOTE — TELEPHONE ENCOUNTER
Pt's daughter called stating that Demetria is still experiencing diarrhea, describes as explosive. She told me that doctors have told her to stay away from Imodium and adds that Imodium doesn't even work for her. She would like to know how to proceed.

## 2024-03-09 ENCOUNTER — TELEPHONE (OUTPATIENT)
Dept: GASTROENTEROLOGY | Facility: CLINIC | Age: 74
End: 2024-03-09
Payer: MEDICARE

## 2024-03-13 ENCOUNTER — OFFICE VISIT (OUTPATIENT)
Dept: GASTROENTEROLOGY | Facility: CLINIC | Age: 74
End: 2024-03-13
Payer: MEDICARE

## 2024-03-13 ENCOUNTER — LAB (OUTPATIENT)
Dept: LAB | Facility: LAB | Age: 74
End: 2024-03-13
Payer: MEDICARE

## 2024-03-13 DIAGNOSIS — D64.9 ANEMIA, UNSPECIFIED TYPE: ICD-10-CM

## 2024-03-13 DIAGNOSIS — K52.832 LYMPHOCYTIC COLITIS: Primary | ICD-10-CM

## 2024-03-13 DIAGNOSIS — Z79.1 NSAID LONG-TERM USE: ICD-10-CM

## 2024-03-13 DIAGNOSIS — K59.04 CHRONIC IDIOPATHIC CONSTIPATION: ICD-10-CM

## 2024-03-13 PROBLEM — M76.899 ENTHESOPATHY OF HIP REGION: Status: ACTIVE | Noted: 2023-11-07

## 2024-03-13 PROBLEM — S20.219A CONTUSION OF CHEST WALL: Status: ACTIVE | Noted: 2024-03-13

## 2024-03-13 PROBLEM — R10.84 GENERALIZED ABDOMINAL PAIN: Status: ACTIVE | Noted: 2024-03-13

## 2024-03-13 PROBLEM — N39.3 STRESS INCONTINENCE IN FEMALE: Status: ACTIVE | Noted: 2024-03-13

## 2024-03-13 PROBLEM — Z96.0 URINARY CATHETER PRESENT: Status: ACTIVE | Noted: 2024-03-13

## 2024-03-13 PROBLEM — R39.15 URINARY URGENCY: Status: ACTIVE | Noted: 2023-10-23

## 2024-03-13 LAB
FERRITIN SERPL-MCNC: 244 NG/ML (ref 8–150)
FOLATE SERPL-MCNC: >24 NG/ML
HCT VFR BLD AUTO: 25.8 % (ref 36–46)
HGB BLD-MCNC: 8.8 G/DL (ref 12–16)
IRON SATN MFR SERPL: 28 % (ref 25–45)
IRON SERPL-MCNC: 90 UG/DL (ref 35–150)
TIBC SERPL-MCNC: 323 UG/DL (ref 240–445)
UIBC SERPL-MCNC: 233 UG/DL (ref 110–370)
VIT B12 SERPL-MCNC: 614 PG/ML (ref 211–911)

## 2024-03-13 PROCEDURE — 1111F DSCHRG MED/CURRENT MED MERGE: CPT | Performed by: NURSE PRACTITIONER

## 2024-03-13 PROCEDURE — 85018 HEMOGLOBIN: CPT

## 2024-03-13 PROCEDURE — 1160F RVW MEDS BY RX/DR IN RCRD: CPT | Performed by: NURSE PRACTITIONER

## 2024-03-13 PROCEDURE — 82607 VITAMIN B-12: CPT

## 2024-03-13 PROCEDURE — 85014 HEMATOCRIT: CPT

## 2024-03-13 PROCEDURE — 83540 ASSAY OF IRON: CPT

## 2024-03-13 PROCEDURE — 99213 OFFICE O/P EST LOW 20 MIN: CPT | Performed by: NURSE PRACTITIONER

## 2024-03-13 PROCEDURE — 83550 IRON BINDING TEST: CPT

## 2024-03-13 PROCEDURE — 1036F TOBACCO NON-USER: CPT | Performed by: NURSE PRACTITIONER

## 2024-03-13 PROCEDURE — 1159F MED LIST DOCD IN RCRD: CPT | Performed by: NURSE PRACTITIONER

## 2024-03-13 PROCEDURE — 82728 ASSAY OF FERRITIN: CPT

## 2024-03-13 PROCEDURE — 82746 ASSAY OF FOLIC ACID SERUM: CPT

## 2024-03-13 PROCEDURE — 36415 COLL VENOUS BLD VENIPUNCTURE: CPT

## 2024-03-13 RX ORDER — ONDANSETRON 8 MG/1
TABLET, ORALLY DISINTEGRATING ORAL
COMMUNITY
Start: 2024-02-19

## 2024-03-13 RX ORDER — CYCLOBENZAPRINE HCL 5 MG
5 TABLET ORAL 3 TIMES DAILY
COMMUNITY
Start: 2024-02-07 | End: 2024-02-17

## 2024-03-13 RX ORDER — POTASSIUM CHLORIDE 750 MG/1
10 TABLET, EXTENDED RELEASE ORAL
COMMUNITY
Start: 2024-03-12

## 2024-03-13 RX ORDER — TROSPIUM CHLORIDE ER 60 MG/1
CAPSULE ORAL
COMMUNITY
Start: 2024-02-01

## 2024-03-13 RX ORDER — MIRABEGRON 25 MG/1
TABLET, FILM COATED, EXTENDED RELEASE ORAL
COMMUNITY
Start: 2020-03-06

## 2024-03-13 RX ORDER — BUDESONIDE 3 MG/1
9 CAPSULE, COATED PELLETS ORAL EVERY MORNING
Qty: 90 CAPSULE | Refills: 0 | Status: CANCELLED | OUTPATIENT
Start: 2024-03-13 | End: 2024-04-12

## 2024-03-13 NOTE — PROGRESS NOTES
Subjective   Patient ID: Demetria Enriquez is a 73 y.o. female who presents for No chief complaint on file..  HPI  Admitted to [facility] 2/2024 for diarrhea and SONIA.   Prior to that she was seen in the clinic on 2/22/2024: Patient with chronic constipation, with bowel habit changes to diarrhea, risk factors include excessive NSAIDs after a fall in September.  ->Colonoscopy 2/28/24:   The ileocecal valve, cecum, ascending colon, hepatic flexure, transverse colon, splenic flexure, descending colon, sigmoid colon, rectosigmoid and rectum appeared normal. Performed random biopsy using biopsy forceps.   -> Pathology: Random colon biopsies with lymphocytic colitis.    Patient no longer taking NSAIDs.  Currently she is having 1 semiformed stool per day.  No longer experiencing diarrhea for the past week.  She takes 2 doses of MiraLAX per day and Metamucil 1 teaspoon daily.  No melena or hematochezia.  No mucus or nocturnal bowel movements.  No abdominal pain.  Patient endorses a 20 pound weight loss since experiencing diarrhea.  Her appetite is stable.  She eats a varied diet and drinks enough water throughout the day.  She is following closely with nephrology.  Now with anemia.  -> Baseline hemoglobin 12-13.  -> Hgb 2/25/2024 7.9, hematocrit 24.6, MCV 86, platelets 186.  -> Creatinine 1.92, GFR 27 BUN 30  Review of Systems   All other systems reviewed and are negative.      Objective   Physical Exam  Constitutional:       General: She is awake. She is not in acute distress.     Appearance: Normal appearance. She is not ill-appearing, toxic-appearing or diaphoretic.   HENT:      Head: Normocephalic and atraumatic.   Eyes:      General: No scleral icterus.     Pupils: Pupils are equal, round, and reactive to light.   Pulmonary:      Effort: Pulmonary effort is normal. No respiratory distress.   Musculoskeletal:         General: Normal range of motion.      Cervical back: Normal range of motion.      Right  lower leg: No edema.      Left lower leg: No edema.   Skin:     General: Skin is warm and dry.      Coloration: Skin is not jaundiced or pale.   Neurological:      General: No focal deficit present.      Mental Status: She is alert and oriented to person, place, and time.      Motor: No weakness.      Gait: Gait normal.   Psychiatric:         Mood and Affect: Mood normal.         Behavior: Behavior is cooperative.         Thought Content: Thought content normal.         Judgment: Judgment normal.         Assessment/Plan   Diagnoses and all orders for this visit:  Lymphocytic colitis  Status post hospitalization for diarrhea and SONIA.  Underwent a colonoscopy with pathology positive for lymphocytic colitis.  Patient without any diarrhea for the past 1 week.  Overall doing well.  Lymphocytic colitis likely secondary to excessive NSAID use.  Anemia, unspecified type  Baseline hemoglobin noted to be 12-13, now with slow decline in hemoglobin.  No overt signs of active GI bleeding.  She is status post colonoscopy, detailed in the HPI.  Risk factor includes excessive NSAID use.  Will consider upper endoscopy once repeat labs obtained.  -     Iron and TIBC; Future  -     Folate; Future  -     Ferritin; Future  -     Vitamin B12; Future  -     Hemoglobin and Hematocrit, Blood; Future  NSAID long-term use  Discontinued by the patient.  Recommend she avoid NSAIDs  Chronic idiopathic constipation  Okay to continue with daily bowel regimen since she has had good success with this.  Recommend she hold for diarrhea.         TRACIE Soria 03/13/24 12:22 PM

## 2024-03-14 ENCOUNTER — TELEPHONE (OUTPATIENT)
Dept: GASTROENTEROLOGY | Facility: CLINIC | Age: 74
End: 2024-03-14
Payer: MEDICARE

## 2024-03-14 DIAGNOSIS — D64.9 ANEMIA, UNSPECIFIED TYPE: Primary | ICD-10-CM

## 2024-03-14 NOTE — TELEPHONE ENCOUNTER
Reviewed labs with the patient she would like to proceed conservatively at this time. Will recheck H/H in 2 weeks.  Anemia likely 2/2 to kidney function and NSAID use.   Continued NSAID cessation.  Close follow up on H/H.   No diarrhea.

## 2024-03-15 NOTE — TELEPHONE ENCOUNTER
Pt called back, she had additional dietary questions. Went over diet with her, answered the questions. She states that she had an episode of diarrhea last night. I recommended BRAT diet/liquids if she feels as if the diarrhea is getting worse. She wanted to know if you feel a prescribed medication would be warranted at this time? She knows she wants to proceed conservatively, but wants your POV.   None

## 2024-03-18 NOTE — TELEPHONE ENCOUNTER
Patient having 1 semiformed stool per day.  She is titrating her MiraLAX.  No diarrhea.  Continuing with Metamucil.  Just wanted reassurance.  Reassurance provided to the patient.  Ordered BMP for renal to be checked in May and recommend she follow-up with renal in June per Dr. Cortez's note.

## 2024-03-19 ENCOUNTER — APPOINTMENT (OUTPATIENT)
Dept: PHYSICAL THERAPY | Facility: CLINIC | Age: 74
End: 2024-03-19
Payer: MEDICARE

## 2024-03-25 ENCOUNTER — APPOINTMENT (OUTPATIENT)
Dept: GASTROENTEROLOGY | Facility: CLINIC | Age: 74
End: 2024-03-25
Payer: MEDICARE

## 2024-03-27 ENCOUNTER — LAB (OUTPATIENT)
Dept: LAB | Facility: LAB | Age: 74
End: 2024-03-27
Payer: MEDICARE

## 2024-03-27 ENCOUNTER — APPOINTMENT (OUTPATIENT)
Dept: GASTROENTEROLOGY | Facility: CLINIC | Age: 74
End: 2024-03-27
Payer: MEDICARE

## 2024-03-27 DIAGNOSIS — D64.9 ANEMIA, UNSPECIFIED TYPE: ICD-10-CM

## 2024-03-27 LAB
HCT VFR BLD AUTO: 28.4 % (ref 36–46)
HGB BLD-MCNC: 9.2 G/DL (ref 12–16)

## 2024-03-27 PROCEDURE — 85014 HEMATOCRIT: CPT

## 2024-03-27 PROCEDURE — 85018 HEMOGLOBIN: CPT

## 2024-03-27 PROCEDURE — 36415 COLL VENOUS BLD VENIPUNCTURE: CPT

## 2024-03-28 ENCOUNTER — APPOINTMENT (OUTPATIENT)
Dept: PHYSICAL THERAPY | Facility: CLINIC | Age: 74
End: 2024-03-28
Payer: MEDICARE

## 2024-04-04 ENCOUNTER — APPOINTMENT (OUTPATIENT)
Dept: PHYSICAL THERAPY | Facility: CLINIC | Age: 74
End: 2024-04-04
Payer: MEDICARE

## 2024-04-11 ENCOUNTER — APPOINTMENT (OUTPATIENT)
Dept: PHYSICAL THERAPY | Facility: CLINIC | Age: 74
End: 2024-04-11
Payer: MEDICARE

## 2024-04-18 ENCOUNTER — APPOINTMENT (OUTPATIENT)
Dept: PHYSICAL THERAPY | Facility: CLINIC | Age: 74
End: 2024-04-18
Payer: MEDICARE

## 2024-04-19 ENCOUNTER — OFFICE VISIT (OUTPATIENT)
Dept: GASTROENTEROLOGY | Facility: CLINIC | Age: 74
End: 2024-04-19
Payer: MEDICARE

## 2024-04-19 DIAGNOSIS — K52.832 LYMPHOCYTIC COLITIS: ICD-10-CM

## 2024-04-19 DIAGNOSIS — D64.9 ANEMIA, UNSPECIFIED TYPE: Primary | ICD-10-CM

## 2024-04-19 PROCEDURE — 1160F RVW MEDS BY RX/DR IN RCRD: CPT | Performed by: NURSE PRACTITIONER

## 2024-04-19 PROCEDURE — 99213 OFFICE O/P EST LOW 20 MIN: CPT | Performed by: NURSE PRACTITIONER

## 2024-04-19 PROCEDURE — 1159F MED LIST DOCD IN RCRD: CPT | Performed by: NURSE PRACTITIONER

## 2024-04-19 NOTE — PROGRESS NOTES
Subjective   Patient ID: Demetria Enriquez is a 73 y.o. female who presents for Follow-up.  [facility] 3/13/2024:  Lymphocytic colitis  Status post hospitalization for diarrhea and SONIA.  Underwent a colonoscopy with pathology positive for lymphocytic colitis.  Patient without any diarrhea for the past 1 week.  Overall doing well.  Lymphocytic colitis likely secondary to excessive NSAID use.  Anemia, unspecified type  Baseline hemoglobin noted to be 12-13, now with slow decline in hemoglobin.  No overt signs of active GI bleeding.  She is status post colonoscopy, detailed in the HPI.  Risk factor includes excessive NSAID use.  Will consider upper endoscopy once repeat labs obtained.  -     Iron and TIBC; Future  -     Folate; Future  -     Ferritin; Future  -     Vitamin B12; Future  -     Hemoglobin and Hematocrit, Blood; Future  NSAID long-term use  Discontinued by the patient.  Recommend she avoid NSAIDs  Chronic idiopathic constipation  Okay to continue with daily bowel regimen since she has had good success with this.  Recommend she hold for diarrhea.  ->Colonoscopy 2/28/24:   The ileocecal valve, cecum, ascending colon, hepatic flexure, transverse colon, splenic flexure, descending colon, sigmoid colon, rectosigmoid and rectum appeared normal. Performed random biopsy using biopsy forceps.   -> Pathology: Random colon biopsies with lymphocytic colitis.    Patient requested follow-up to discuss overall GI complaints and diet.  At this time she is having 1-2 semiformed stools per day.  She feels fully evacuated.  No abdominal pain or bloating.  Takes Metamucil 1 teaspoon daily and drinks roughly 64 ounces of water per day.  Her diet is well-balanced and she is active.  She has added in sauerkraut daily to her regimen to help with a healthy microbiome.  She is also taking align probiotics.  No melena or hematochezia.  Weight and appetite are stable.  Overall she is doing well with no GI complaints.  Review of Systems    All other systems reviewed and are negative.      Objective   Physical Exam  Constitutional:       General: She is awake. She is not in acute distress.     Appearance: Normal appearance. She is well-developed. She is not ill-appearing, toxic-appearing or diaphoretic.   HENT:      Head: Normocephalic and atraumatic.   Eyes:      General: No scleral icterus.     Pupils: Pupils are equal, round, and reactive to light.   Pulmonary:      Effort: Pulmonary effort is normal. No respiratory distress.   Musculoskeletal:         General: Normal range of motion.   Skin:     Coloration: Skin is not cyanotic, jaundiced or pale.   Neurological:      General: No focal deficit present.      Mental Status: She is alert and oriented to person, place, and time.   Psychiatric:         Behavior: Behavior is cooperative.         Assessment/Plan   Diagnoses and all orders for this visit:  Anemia, unspecified type  -     Hemoglobin and Hematocrit, Blood; Future  Lymphocytic colitis  73-year-old female diagnosed with lymphocytic colitis, likely secondary to NSAID use.  Overall her symptoms have completely resolved with adequate fluids, fiber and probiotics.  No red flag symptoms noted.  Recommend she continue her current POC.  She can follow-up with GI as needed.  Recommend she continue complete NSAID cessation.  Keep appointment with nephrology.           TRACIE Soria 04/19/24 8:47 AM

## 2024-05-06 ENCOUNTER — LAB (OUTPATIENT)
Dept: LAB | Facility: LAB | Age: 74
End: 2024-05-06
Payer: MEDICARE

## 2024-05-06 DIAGNOSIS — N18.4 CHRONIC KIDNEY DISEASE, STAGE 4 (SEVERE) (MULTI): Primary | ICD-10-CM

## 2024-05-06 DIAGNOSIS — N17.9 ACUTE KIDNEY FAILURE, UNSPECIFIED (CMS-HCC): ICD-10-CM

## 2024-05-06 DIAGNOSIS — D64.9 ANEMIA, UNSPECIFIED TYPE: ICD-10-CM

## 2024-05-06 DIAGNOSIS — E83.52 HYPERCALCEMIA: ICD-10-CM

## 2024-05-06 DIAGNOSIS — E55.9 VITAMIN D DEFICIENCY, UNSPECIFIED: ICD-10-CM

## 2024-05-06 LAB
25(OH)D3 SERPL-MCNC: 57 NG/ML (ref 30–100)
ALBUMIN SERPL BCP-MCNC: 4.3 G/DL (ref 3.4–5)
ANION GAP SERPL CALC-SCNC: 14 MMOL/L (ref 10–20)
ANION GAP SERPL CALC-SCNC: 14 MMOL/L (ref 10–20)
BUN SERPL-MCNC: 32 MG/DL (ref 6–23)
BUN SERPL-MCNC: 33 MG/DL (ref 6–23)
CALCIUM SERPL-MCNC: 9.5 MG/DL (ref 8.6–10.6)
CALCIUM SERPL-MCNC: 9.5 MG/DL (ref 8.6–10.6)
CHLORIDE SERPL-SCNC: 97 MMOL/L (ref 98–107)
CHLORIDE SERPL-SCNC: 98 MMOL/L (ref 98–107)
CO2 SERPL-SCNC: 25 MMOL/L (ref 21–32)
CO2 SERPL-SCNC: 25 MMOL/L (ref 21–32)
CREAT SERPL-MCNC: 1.44 MG/DL (ref 0.5–1.05)
CREAT SERPL-MCNC: 1.46 MG/DL (ref 0.5–1.05)
CREAT UR-MCNC: 30.1 MG/DL (ref 20–320)
EGFRCR SERPLBLD CKD-EPI 2021: 37 ML/MIN/1.73M*2
EGFRCR SERPLBLD CKD-EPI 2021: 38 ML/MIN/1.73M*2
GLUCOSE SERPL-MCNC: 80 MG/DL (ref 74–99)
GLUCOSE SERPL-MCNC: 88 MG/DL (ref 74–99)
HCT VFR BLD AUTO: 29.7 % (ref 36–46)
HGB BLD-MCNC: 9.5 G/DL (ref 12–16)
MICROALBUMIN UR-MCNC: <7 MG/L
MICROALBUMIN/CREAT UR: NORMAL MG/G{CREAT}
PHOSPHATE SERPL-MCNC: 4.5 MG/DL (ref 2.5–4.9)
POTASSIUM SERPL-SCNC: 4.5 MMOL/L (ref 3.5–5.3)
POTASSIUM SERPL-SCNC: 4.9 MMOL/L (ref 3.5–5.3)
SODIUM SERPL-SCNC: 131 MMOL/L (ref 136–145)
SODIUM SERPL-SCNC: 132 MMOL/L (ref 136–145)

## 2024-05-06 PROCEDURE — 82570 ASSAY OF URINE CREATININE: CPT

## 2024-05-06 PROCEDURE — 85014 HEMATOCRIT: CPT

## 2024-05-06 PROCEDURE — 82306 VITAMIN D 25 HYDROXY: CPT

## 2024-05-06 PROCEDURE — 80069 RENAL FUNCTION PANEL: CPT

## 2024-05-06 PROCEDURE — 80048 BASIC METABOLIC PNL TOTAL CA: CPT

## 2024-05-06 PROCEDURE — 36415 COLL VENOUS BLD VENIPUNCTURE: CPT

## 2024-05-06 PROCEDURE — 82043 UR ALBUMIN QUANTITATIVE: CPT

## 2024-05-06 PROCEDURE — 85018 HEMOGLOBIN: CPT

## 2024-05-07 ENCOUNTER — TELEPHONE (OUTPATIENT)
Dept: GASTROENTEROLOGY | Facility: CLINIC | Age: 74
End: 2024-05-07
Payer: MEDICARE

## 2024-05-07 NOTE — TELEPHONE ENCOUNTER
Patient called stating that she has been under more stress than usual,  is in the hospital. She wants to know if she should expect a colitis flare with the stress? And what she would need to do if it does?

## 2024-05-08 ENCOUNTER — TELEPHONE (OUTPATIENT)
Dept: UROLOGY | Facility: CLINIC | Age: 74
End: 2024-05-08
Payer: MEDICARE

## 2024-05-08 NOTE — TELEPHONE ENCOUNTER
Pt left message stating she just had her BW done for her nephrologist and the results are abnormal and she is wondering if she should double up on the Flomax or continue to take just the one in the evening. Please advise, thanks.

## 2024-05-09 NOTE — TELEPHONE ENCOUNTER
Patient with an episode of diarrhea (4 times in one day). Took Imodium x1 with ok response. Her  was transferred by EMS to the hospital for a herniated disc, and this is stressing her out.  Her BM's are now back to baseline (1-2 soft stools p/d).  No melena or hematochezia.   No abd pain.   Recommend she continue the current POC.  If she experiences diarrhea she should use Imodium, precise instruction provided to the patient.  No more than 8 mg in a 24-hour period.

## 2024-05-09 NOTE — TELEPHONE ENCOUNTER
Spoke with pt who states she feels like she empties all the way but that it takes a very long time to empty, especially during the day due to a very slow stream so that is why she was inquiring if she should start taking the flomax twice a day again. Please advise, thanks.

## 2024-05-15 ENCOUNTER — TELEPHONE (OUTPATIENT)
Dept: UROLOGY | Facility: CLINIC | Age: 74
End: 2024-05-15
Payer: MEDICARE

## 2024-05-15 NOTE — TELEPHONE ENCOUNTER
Allison-Can you please call patient to move up her appointment sooner. She will also need a bladder scan at that appointment, so if you could put that in comments that would be helpful.  She is schedule now for 7/15/2024 and needs to be moved up sooner.  She can be reached at [phone]  Thanks!

## 2024-06-03 ENCOUNTER — OFFICE VISIT (OUTPATIENT)
Dept: UROLOGY | Facility: CLINIC | Age: 74
End: 2024-06-03
Payer: MEDICARE

## 2024-06-03 VITALS — DIASTOLIC BLOOD PRESSURE: 71 MMHG | SYSTOLIC BLOOD PRESSURE: 120 MMHG | HEART RATE: 60 BPM

## 2024-06-03 DIAGNOSIS — R33.9 URINARY RETENTION: ICD-10-CM

## 2024-06-03 DIAGNOSIS — R33.9 URINARY RETENTION: Primary | ICD-10-CM

## 2024-06-03 DIAGNOSIS — N39.41 URGENCY INCONTINENCE: ICD-10-CM

## 2024-06-03 DIAGNOSIS — R35.1 NOCTURIA: ICD-10-CM

## 2024-06-03 LAB
POC APPEARANCE, URINE: CLEAR
POC BILIRUBIN, URINE: NEGATIVE
POC BLOOD, URINE: NEGATIVE
POC COLOR, URINE: ABNORMAL
POC GLUCOSE, URINE: NEGATIVE MG/DL
POC KETONES, URINE: NEGATIVE MG/DL
POC LEUKOCYTES, URINE: ABNORMAL
POC NITRITE,URINE: NEGATIVE
POC PH, URINE: 6 PH
POC PROTEIN, URINE: NEGATIVE MG/DL
POC SPECIFIC GRAVITY, URINE: 1.02
POC UROBILINOGEN, URINE: 0.2 EU/DL

## 2024-06-03 PROCEDURE — 51798 US URINE CAPACITY MEASURE: CPT | Performed by: NURSE PRACTITIONER

## 2024-06-03 PROCEDURE — 81002 URINALYSIS NONAUTO W/O SCOPE: CPT | Performed by: NURSE PRACTITIONER

## 2024-06-03 PROCEDURE — 1159F MED LIST DOCD IN RCRD: CPT | Performed by: NURSE PRACTITIONER

## 2024-06-03 PROCEDURE — 99213 OFFICE O/P EST LOW 20 MIN: CPT | Performed by: NURSE PRACTITIONER

## 2024-06-03 RX ORDER — TAMSULOSIN HYDROCHLORIDE 0.4 MG/1
0.4 CAPSULE ORAL NIGHTLY
Qty: 90 CAPSULE | Refills: 3 | Status: SHIPPED | OUTPATIENT
Start: 2024-06-03 | End: 2025-06-03

## 2024-06-03 NOTE — PATIENT INSTRUCTIONS
As things are going well now, will continue flomax for now  Will follow up October, still stop flomax 2 weeks prior and do PVR when she comes in  Nurse line [phone]

## 2024-06-03 NOTE — PROGRESS NOTES
06/03/24   [id number]    Chief Complaint   Patient presents with    pelvic exam for prolapse      Subjective      HPI Demetria Enriquez is a 73 y.o. female who presents for pelvic exam to evaluate for prolapse; PVR 76 ml today; Last seen 1/15/24 as a follow up urinary retention that developed in the fall of 2023 related to a fall and back issues along w UTI:     Creatinine 1.44, GFR 38 improved some in past few mos r/t naproxen in past 6 mos per patient, no longer taking; developed colitis, seeing Dr Jesus for management;     No recurrence of prolape, normal tone pelvic floor, discussed coming off tamsulosin, would like to leave everything alone now as doing so much better now; will come off tamsulosin in fall and come in for PVR couple weeks later;     Objective     /71   Pulse 60    Physical Exam  Genitourinary:     Comments: No vulvar lesions, neg Qtip tenderness, mod atrophy  Neg CST lying down, Neg levator ani tenderness or tightness, + leakage w valsava  No cystocele, no rectocele, hysterectomy  Non tender ovaries/uterus, difficult exam d/t  body habitus   No rectal exam done        General: Appears comfortable and in no apparent distress, well nourished  Head: Normocephalic, atraumatic  Neck: trachea midline  Respiratory: respirations unlabored, no wheezes, and no use of accessory muscles  Cardiovascular: at rest no dyspnea, well perfused  Skin: no visible rashes or lesions  Neurologic: grossly intact, oriented to person, place, and time  Psychiatric: mood and affect appropriate  Musculoskeletal: in chair for appt. no difficulty w upper body movement      Assessment/Plan   Problem List Items Addressed This Visit          Genitourinary and Reproductive    Urinary retention - Primary    Relevant Orders    Post-Void Residual (Completed)    POCT UA (nonautomated) manually resulted (Completed)    Nocturia     Other Visit Diagnoses       Urgency incontinence              Orders Placed This Encounter    Procedures    Post-Void Residual    POCT UA (nonautomated) manually resulted     Order Specific Question:   Release result to [facility] [employer]     Answer:   Immediate [1]      As things are going well now, will continue flomax for now  Will follow up October, still stop flomax 2 weeks prior and do PVR when she comes in  Nurse line [phone]     Follow up October PVR, stopping flomax 2 wk prior  Tuyet Wahl, APRN-CNP  Lab Results   Component Value Date    GLUCOSE 88 05/06/2024    CALCIUM 9.5 05/06/2024     (L) 05/06/2024    K 4.9 05/06/2024    CO2 25 05/06/2024    CL 97 (L) 05/06/2024    BUN 33 (H) 05/06/2024    CREATININE 1.44 (H) 05/06/2024

## 2024-06-03 NOTE — TELEPHONE ENCOUNTER
Pt left message stating she apologizes but when she came home from appt today with Tuyet she realized that she will in fact need a refill of the Flomax sent over, thanks.

## 2024-06-19 ENCOUNTER — TELEPHONE (OUTPATIENT)
Dept: UROLOGY | Facility: CLINIC | Age: 74
End: 2024-06-19
Payer: MEDICARE

## 2024-06-19 NOTE — TELEPHONE ENCOUNTER
Pt left message stating that when you asked her at her most recent appt if she was experiencing a lot of urgency and leakage, she said no, but now for the past week or two she is experiencing that and wants to know what she should do. Please advise, thank you.

## 2024-06-20 DIAGNOSIS — R35.1 NOCTURIA: ICD-10-CM

## 2024-06-20 DIAGNOSIS — N39.41 URGENCY INCONTINENCE: Primary | ICD-10-CM

## 2024-06-20 LAB
APPEARANCE UR: CLEAR
BACTERIA #/AREA URNS AUTO: ABNORMAL /HPF
BILIRUB UR STRIP.AUTO-MCNC: NEGATIVE MG/DL
COLOR UR: ABNORMAL
GLUCOSE UR STRIP.AUTO-MCNC: NORMAL MG/DL
KETONES UR STRIP.AUTO-MCNC: NEGATIVE MG/DL
LEUKOCYTE ESTERASE UR QL STRIP.AUTO: ABNORMAL
MUCOUS THREADS #/AREA URNS AUTO: ABNORMAL /LPF
NITRITE UR QL STRIP.AUTO: NEGATIVE
PH UR STRIP.AUTO: 6.5 [PH]
PROT UR STRIP.AUTO-MCNC: NEGATIVE MG/DL
RBC # UR STRIP.AUTO: NEGATIVE /UL
RBC #/AREA URNS AUTO: ABNORMAL /HPF
SP GR UR STRIP.AUTO: 1
UROBILINOGEN UR STRIP.AUTO-MCNC: NORMAL MG/DL
WBC #/AREA URNS AUTO: ABNORMAL /HPF

## 2024-06-20 PROCEDURE — 81001 URINALYSIS AUTO W/SCOPE: CPT

## 2024-06-20 PROCEDURE — 87186 SC STD MICRODIL/AGAR DIL: CPT

## 2024-06-20 PROCEDURE — 87086 URINE CULTURE/COLONY COUNT: CPT

## 2024-06-21 NOTE — TELEPHONE ENCOUNTER
Pt left another message regarding not being able to see her results in my chart and she is worried about if she has infection. Looks like culture pre-gore is back and positive. Please call pt and advise of treatment plan, thank you.

## 2024-06-22 LAB — BACTERIA UR CULT: ABNORMAL

## 2024-06-24 DIAGNOSIS — N30.00 ACUTE CYSTITIS WITHOUT HEMATURIA: Primary | ICD-10-CM

## 2024-06-24 RX ORDER — AMOXICILLIN AND CLAVULANATE POTASSIUM 875; 125 MG/1; MG/1
1 TABLET, FILM COATED ORAL 2 TIMES DAILY
Qty: 14 TABLET | Refills: 0 | Status: SHIPPED | OUTPATIENT
Start: 2024-06-24 | End: 2024-07-01

## 2024-06-25 ENCOUNTER — TELEPHONE (OUTPATIENT)
Dept: UROLOGY | Facility: CLINIC | Age: 74
End: 2024-06-25
Payer: MEDICARE

## 2024-06-25 NOTE — TELEPHONE ENCOUNTER
Patient left voicemail message yesterday after you just talked to her. She forgot to ask if her antibiotic and Budesonide with interact with each other?  #[phone]

## 2024-07-02 ENCOUNTER — TELEPHONE (OUTPATIENT)
Dept: UROLOGY | Facility: CLINIC | Age: 74
End: 2024-07-02
Payer: MEDICARE

## 2024-07-02 NOTE — TELEPHONE ENCOUNTER
Pt left message stating she finished the Augmentin and was asking if she should drop off another urine. Spoke with pt and informed her it is not recommended to re-test. Pt states she definitely still has urinary frequency and is wondering if there is something she can try to take for that, keeping in mind she is terrified to go into retention again but her frequency is interfering with her life. Please advise, thanks. Pt is not currently on any OAB meds.

## 2024-07-03 DIAGNOSIS — R35.0 FREQUENCY OF MICTURITION: Primary | ICD-10-CM

## 2024-07-03 RX ORDER — TROSPIUM CHLORIDE 20 MG/1
20 TABLET, FILM COATED ORAL DAILY
Qty: 30 TABLET | Refills: 11 | Status: SHIPPED | OUTPATIENT
Start: 2024-07-03 | End: 2025-07-03

## 2024-07-03 NOTE — PROGRESS NOTES
Patient called in w bothersome urinary frequency that is keeping her from getting out. Rx sent in as requested, will start w immediate release in case any issues w emptying, PVR last visit was good. Also placed order for urine to check that UTI cleared up.

## 2024-07-05 ENCOUNTER — LAB (OUTPATIENT)
Dept: LAB | Facility: LAB | Age: 74
End: 2024-07-05
Payer: MEDICARE

## 2024-07-05 DIAGNOSIS — R35.0 FREQUENCY OF MICTURITION: ICD-10-CM

## 2024-07-05 PROCEDURE — 81001 URINALYSIS AUTO W/SCOPE: CPT

## 2024-07-06 LAB
APPEARANCE UR: CLEAR
BILIRUB UR STRIP.AUTO-MCNC: NEGATIVE MG/DL
COLOR UR: ABNORMAL
GLUCOSE UR STRIP.AUTO-MCNC: NORMAL MG/DL
KETONES UR STRIP.AUTO-MCNC: NEGATIVE MG/DL
LEUKOCYTE ESTERASE UR QL STRIP.AUTO: ABNORMAL
NITRITE UR QL STRIP.AUTO: NEGATIVE
PH UR STRIP.AUTO: 6.5 [PH]
PROT UR STRIP.AUTO-MCNC: NEGATIVE MG/DL
RBC # UR STRIP.AUTO: NEGATIVE /UL
RBC #/AREA URNS AUTO: NORMAL /HPF
SP GR UR STRIP.AUTO: 1.01
SQUAMOUS #/AREA URNS AUTO: NORMAL /HPF
UROBILINOGEN UR STRIP.AUTO-MCNC: NORMAL MG/DL
WBC #/AREA URNS AUTO: NORMAL /HPF

## 2024-07-08 ENCOUNTER — TELEPHONE (OUTPATIENT)
Dept: UROLOGY | Facility: CLINIC | Age: 74
End: 2024-07-08
Payer: MEDICARE

## 2024-07-08 NOTE — TELEPHONE ENCOUNTER
----- Message from TRACIE Tapia sent at 7/8/2024  7:48 AM EDT -----  Let braulio know no infection, can take one trospium 20 mg daily morning if worse during the day or at night if worse at night and see how she tolerates then we can increase if not causing retention. Maybe we can get her in before I go out in July for PVR if not already scheduled for one in few weeks.    Thanks, Tuyet  ----- Message -----  From: Lab, Background User  Sent: 7/6/2024   2:23 AM EDT  To: TRACIE Tapia

## 2024-07-15 ENCOUNTER — APPOINTMENT (OUTPATIENT)
Dept: UROLOGY | Facility: CLINIC | Age: 74
End: 2024-07-15
Payer: MEDICARE

## 2024-07-17 ENCOUNTER — OFFICE VISIT (OUTPATIENT)
Dept: UROLOGY | Facility: CLINIC | Age: 74
End: 2024-07-17
Payer: MEDICARE

## 2024-07-17 VITALS
SYSTOLIC BLOOD PRESSURE: 127 MMHG | HEIGHT: 65 IN | DIASTOLIC BLOOD PRESSURE: 75 MMHG | TEMPERATURE: 98.4 F | HEART RATE: 67 BPM | WEIGHT: 135 LBS | BODY MASS INDEX: 22.49 KG/M2

## 2024-07-17 DIAGNOSIS — N39.41 URGENCY INCONTINENCE: ICD-10-CM

## 2024-07-17 DIAGNOSIS — R33.9 URINARY RETENTION: ICD-10-CM

## 2024-07-17 DIAGNOSIS — R35.1 NOCTURIA: ICD-10-CM

## 2024-07-17 DIAGNOSIS — R35.0 URINARY FREQUENCY: Primary | ICD-10-CM

## 2024-07-17 LAB
POC APPEARANCE, URINE: CLEAR
POC BILIRUBIN, URINE: NEGATIVE
POC BLOOD, URINE: NEGATIVE
POC COLOR, URINE: YELLOW
POC GLUCOSE, URINE: NEGATIVE MG/DL
POC KETONES, URINE: NEGATIVE MG/DL
POC LEUKOCYTES, URINE: NEGATIVE
POC NITRITE,URINE: NEGATIVE
POC PH, URINE: 6 PH
POC PROTEIN, URINE: NEGATIVE MG/DL
POC SPECIFIC GRAVITY, URINE: <=1.005
POC UROBILINOGEN, URINE: 0.2 EU/DL

## 2024-07-17 PROCEDURE — 99213 OFFICE O/P EST LOW 20 MIN: CPT | Performed by: NURSE PRACTITIONER

## 2024-07-17 PROCEDURE — 81003 URINALYSIS AUTO W/O SCOPE: CPT | Performed by: NURSE PRACTITIONER

## 2024-07-17 PROCEDURE — 1159F MED LIST DOCD IN RCRD: CPT | Performed by: NURSE PRACTITIONER

## 2024-07-17 PROCEDURE — G2211 COMPLEX E/M VISIT ADD ON: HCPCS | Performed by: NURSE PRACTITIONER

## 2024-07-17 PROCEDURE — 1036F TOBACCO NON-USER: CPT | Performed by: NURSE PRACTITIONER

## 2024-07-17 PROCEDURE — 51798 US URINE CAPACITY MEASURE: CPT | Performed by: NURSE PRACTITIONER

## 2024-07-17 RX ORDER — BUDESONIDE 3 MG/1
6 CAPSULE, COATED PELLETS ORAL DAILY
COMMUNITY
Start: 2024-06-04

## 2024-07-17 NOTE — PATIENT INSTRUCTIONS
Plan:   Stop tamsulosin, if any concerns not emptying  Can go back on the tamsulosin  Tried PFPT, may try again  Has trospium if this doesn't help  Anxiety is difficult, planning on starting buspar  Follow up September PVR  Nurse line [phone]

## 2024-07-17 NOTE — PROGRESS NOTES
07/17/24   [id number]    PVR, follow up urinary symptoms     Subjective      HPI Demetria Enriquez is a 73 y.o. female who presents for follow up urinary retention and incontinence; last seen in office 6/3/24. At that time PVR 76 ml and no recurrence of prolapse;     Hx urinary retention that developed in the fall of 2023 related to a fall and back issues along w UTI:     Since last seen, called w increased bladder symptoms, treated for UTI in June; urine negative for infection now; sent in sanctura 20 mg to take at bedtime as having such difficulty w frequency; never started sanctura as she was afraid of causing her bladder not to empty;     Dealing w colitis that occurred d/t taking naproxen in past;     Sudden urges sometimes, occasional cough leak; hasn't started the trospium as she was afraid to as it took so long to get bladder under control;     PVR 8 ml today, up 4 x at night but that has been for long time, but now wearing depends at night as she doesn't make it to bathroom before urination starts;     5/6/24 Creatinine 1.44, GFR 38 improved some      had stroke, dealing w depends and pads for him; he is at home now;     Past Medical History:   Diagnosis Date    Anxiety     Arthritis     Delayed emergence from general anesthesia     Depression     Drusen (degenerative) of macula, unspecified eye 02/19/2015    Macular drusen    GERD (gastroesophageal reflux disease)     Hyperlipidemia     Hypothyroid     Migraine      Past Surgical History:   Procedure Laterality Date    HERNIA REPAIR      HYSTERECTOMY      OTHER SURGICAL HISTORY  03/06/2020    Rectocele and cystocele repair     Social History     Tobacco Use    Smoking status: Never    Smokeless tobacco: Never   Vaping Use    Vaping status: Never Used   Substance Use Topics    Alcohol use: Not Currently    Drug use: Never     Family History   Problem Relation Name Age of Onset    Dementia Mother      Heart attack Father           Objective     /75  "  Pulse 67   Temp 36.9 °C (98.4 °F)   Ht 1.651 m (5' 5\")   Wt 61.2 kg (135 lb)   BMI 22.47 kg/m²      General: Appears comfortable and in no apparent distress, well nourished  Head: Normocephalic, atraumatic  Neck: trachea midline  Respiratory: respirations unlabored, no wheezes, and no use of accessory muscles  Cardiovascular: at rest no dyspnea, well perfused  Skin: no visible rashes or lesions  Neurologic: grossly intact, oriented to person, place, and time  Psychiatric: mood and affect appropriate  Musculoskeletal: in chair for appt. no difficulty w upper body movement      Assessment/Plan   Problem List Items Addressed This Visit          Genitourinary and Reproductive    Urinary retention    Nocturia     Other Visit Diagnoses       Urinary frequency    -  Primary    Relevant Orders    POCT UA Automated manually resulted (Completed)    Post-Void Residual (Completed)    Urgency incontinence                Orders Placed This Encounter   Procedures    Post-Void Residual    POCT UA Automated manually resulted     Order Specific Question:   Release result to [facility]     Answer:   Immediate [1]      Plan:   Stop tamsulosin, if any concerns not emptying  Can go back on the tamsulosin  Tried PFPT, may try again  Has trospium if this doesn't help  Anxiety is difficult, planning on starting buspar       Follow up September PVR  NUZHAT Tapia-CNP  Lab Results   Component Value Date    GLUCOSE 88 05/06/2024    CALCIUM 9.5 05/06/2024     (L) 05/06/2024    K 4.9 05/06/2024    CO2 25 05/06/2024    CL 97 (L) 05/06/2024    BUN 33 (H) 05/06/2024    CREATININE 1.44 (H) 05/06/2024       "

## 2024-07-22 ENCOUNTER — LAB (OUTPATIENT)
Dept: LAB | Facility: LAB | Age: 74
End: 2024-07-22
Payer: MEDICARE

## 2024-07-22 DIAGNOSIS — E83.42 HYPOMAGNESEMIA: Primary | ICD-10-CM

## 2024-07-22 DIAGNOSIS — I12.9 HYPERTENSIVE CHRONIC KIDNEY DISEASE WITH STAGE 1 THROUGH STAGE 4 CHRONIC KIDNEY DISEASE, OR UNSPECIFIED CHRONIC KIDNEY DISEASE: ICD-10-CM

## 2024-07-22 DIAGNOSIS — E55.9 VITAMIN D DEFICIENCY, UNSPECIFIED: ICD-10-CM

## 2024-07-22 PROCEDURE — 80069 RENAL FUNCTION PANEL: CPT

## 2024-07-22 PROCEDURE — 36415 COLL VENOUS BLD VENIPUNCTURE: CPT

## 2024-07-22 PROCEDURE — 83735 ASSAY OF MAGNESIUM: CPT

## 2024-07-22 PROCEDURE — 82570 ASSAY OF URINE CREATININE: CPT

## 2024-07-22 PROCEDURE — 82043 UR ALBUMIN QUANTITATIVE: CPT

## 2024-07-23 LAB
ALBUMIN SERPL BCP-MCNC: 4.2 G/DL (ref 3.4–5)
ANION GAP SERPL CALC-SCNC: 12 MMOL/L (ref 10–20)
BUN SERPL-MCNC: 30 MG/DL (ref 6–23)
CALCIUM SERPL-MCNC: 10 MG/DL (ref 8.6–10.6)
CHLORIDE SERPL-SCNC: 96 MMOL/L (ref 98–107)
CO2 SERPL-SCNC: 26 MMOL/L (ref 21–32)
CREAT SERPL-MCNC: 1.17 MG/DL (ref 0.5–1.05)
CREAT UR-MCNC: 37.7 MG/DL (ref 20–320)
EGFRCR SERPLBLD CKD-EPI 2021: 49 ML/MIN/1.73M*2
GLUCOSE SERPL-MCNC: 88 MG/DL (ref 74–99)
MAGNESIUM SERPL-MCNC: 2.08 MG/DL (ref 1.6–2.4)
MICROALBUMIN UR-MCNC: <7 MG/L
MICROALBUMIN/CREAT UR: NORMAL MG/G{CREAT}
PHOSPHATE SERPL-MCNC: 4.3 MG/DL (ref 2.5–4.9)
POTASSIUM SERPL-SCNC: 4.1 MMOL/L (ref 3.5–5.3)
SODIUM SERPL-SCNC: 130 MMOL/L (ref 136–145)

## 2024-09-09 ENCOUNTER — APPOINTMENT (OUTPATIENT)
Dept: UROLOGY | Facility: CLINIC | Age: 74
End: 2024-09-09
Payer: MEDICARE

## 2024-09-09 VITALS
WEIGHT: 130 LBS | TEMPERATURE: 97.5 F | HEART RATE: 84 BPM | HEIGHT: 65 IN | SYSTOLIC BLOOD PRESSURE: 117 MMHG | DIASTOLIC BLOOD PRESSURE: 78 MMHG | BODY MASS INDEX: 21.66 KG/M2

## 2024-09-09 DIAGNOSIS — N39.41 URGENCY INCONTINENCE: ICD-10-CM

## 2024-09-09 DIAGNOSIS — R35.0 URINARY FREQUENCY: ICD-10-CM

## 2024-09-09 DIAGNOSIS — R35.1 NOCTURIA: ICD-10-CM

## 2024-09-09 DIAGNOSIS — R33.9 URINARY RETENTION: Primary | ICD-10-CM

## 2024-09-09 LAB
POC APPEARANCE, URINE: CLEAR
POC BILIRUBIN, URINE: NEGATIVE
POC BLOOD, URINE: ABNORMAL
POC COLOR, URINE: YELLOW
POC GLUCOSE, URINE: NEGATIVE MG/DL
POC KETONES, URINE: NEGATIVE MG/DL
POC LEUKOCYTES, URINE: NEGATIVE
POC NITRITE,URINE: NEGATIVE
POC PH, URINE: 6 PH
POC PROTEIN, URINE: NEGATIVE MG/DL
POC SPECIFIC GRAVITY, URINE: 1.01
POC UROBILINOGEN, URINE: 0.2 EU/DL

## 2024-09-09 PROCEDURE — 99213 OFFICE O/P EST LOW 20 MIN: CPT | Performed by: NURSE PRACTITIONER

## 2024-09-09 PROCEDURE — G2211 COMPLEX E/M VISIT ADD ON: HCPCS | Performed by: NURSE PRACTITIONER

## 2024-09-09 PROCEDURE — 51798 US URINE CAPACITY MEASURE: CPT | Performed by: NURSE PRACTITIONER

## 2024-09-09 PROCEDURE — 81003 URINALYSIS AUTO W/O SCOPE: CPT | Performed by: NURSE PRACTITIONER

## 2024-09-09 PROCEDURE — 1159F MED LIST DOCD IN RCRD: CPT | Performed by: NURSE PRACTITIONER

## 2024-09-09 NOTE — PROGRESS NOTES
"09/09/24   [id number]    PVR, follow up urinary symptoms     Subjective      HPI Demetria Enriquez is a 73 y.o. female who presents for follow up urinary retention and incontinence; last seen in office 7/17/24. At that time PVR 8 ml, 6/3/24 no recurrence of prolapse;   Was going to come off tamsulosin and come in for PVR but was too afraid, has PTSD from having so many health issues and feels the catheter was the worst, she doesn't want to take chance with  not emptying if came off tamsulosin; would like to continue for now; discussed nocturia; will cut off fluids a little sooner as now drinking up until 7 pm and going to bed at 9 pm; will elevate legs in evening; discussed OAB meds, may consider trospium, already has at night if needed;     PVR today 19 ml, UA trace heme neg otherwise;     Hx urinary retention that developed in the fall of 2023 related to a fall and back issues along w UTI:     7/22/24 Creatinine 1.17, GFR 49 improved some      had stroke, dealing w depends and pads for him; he is at home now;     History reviewed:  Past Medical History:   Diagnosis Date    Anxiety     Arthritis     Delayed emergence from general anesthesia     Depression     Drusen (degenerative) of macula, unspecified eye 02/19/2015    Macular drusen    GERD (gastroesophageal reflux disease)     Hyperlipidemia     Hypothyroid     Migraine      Past Surgical History:   Procedure Laterality Date    HERNIA REPAIR      HYSTERECTOMY      OTHER SURGICAL HISTORY  03/06/2020    Rectocele and cystocele repair     Social History     Tobacco Use    Smoking status: Never    Smokeless tobacco: Never   Vaping Use    Vaping status: Never Used   Substance Use Topics    Alcohol use: Not Currently    Drug use: Never     Family History   Problem Relation Name Age of Onset    Dementia Mother      Heart attack Father           Objective     /78   Pulse 84   Temp 36.4 °C (97.5 °F)   Ht 1.651 m (5' 5\")   Wt 59 kg (130 lb)   BMI 21.63 " kg/m²      General: Appears comfortable and in no apparent distress, well nourished  Head: Normocephalic, atraumatic  Neck: trachea midline  Respiratory: respirations unlabored, no wheezes, and no use of accessory muscles  Cardiovascular: at rest no dyspnea, well perfused  Skin: no visible rashes or lesions  Neurologic: grossly intact, oriented to person, place, and time  Psychiatric: mood and affect appropriate  Musculoskeletal: in chair for appt. no difficulty w upper body movement      Assessment/Plan   Problem List Items Addressed This Visit          Genitourinary and Reproductive    Urinary retention - Primary    Nocturia     Other Visit Diagnoses       Urinary frequency        Relevant Orders    POCT UA Automated manually resulted (Completed)    Post-Void Residual (Completed)    Urgency incontinence                Orders Placed This Encounter   Procedures    Post-Void Residual    POCT UA Automated manually resulted     Order Specific Question:   Release result to [facility]     Answer:   Immediate [1]      Would like to continue tamsulosin for now,   Will move up time to earlier in day if tolerated  Hold fluids after 6 pm  Legs elevated in evening  Follow up 3 mos PVR  Nurse line [phone]         Tuyet Wahl APRN-CNP  Lab Results   Component Value Date    GLUCOSE 88 07/22/2024    CALCIUM 10.0 07/22/2024     (L) 07/22/2024    K 4.1 07/22/2024    CO2 26 07/22/2024    CL 96 (L) 07/22/2024    BUN 30 (H) 07/22/2024    CREATININE 1.17 (H) 07/22/2024

## 2024-09-09 NOTE — PATIENT INSTRUCTIONS
Would like to continue tamsulosin for now,   Will move up time to earlier in day if tolerated  Hold fluids after 6 pm  Legs elevated in evening  Follow up 3 mos PVR  Nurse line [phone]

## 2024-09-16 ENCOUNTER — TELEPHONE (OUTPATIENT)
Dept: UROLOGY | Facility: CLINIC | Age: 74
End: 2024-09-16
Payer: MEDICARE

## 2024-09-16 NOTE — TELEPHONE ENCOUNTER
"Pt LVM on nurse line stating that she was told she could stay on Flomax for as long as she wanted in her office appointment. Pt stated that when she went over her paperwork, it stated \"tamsulosin\". Pt stated she would like clarification on the names.    Spoke with pt about medication. Advised pt that tamsulosin is the generic name for Flomax. Pt stated understanding.   "

## 2024-09-23 ENCOUNTER — TELEPHONE (OUTPATIENT)
Dept: UROLOGY | Facility: CLINIC | Age: 74
End: 2024-09-23
Payer: MEDICARE

## 2024-09-23 NOTE — TELEPHONE ENCOUNTER
Pt left message inquiring if the Flomax can cause dehydration. She states that she has had that issue earlier in the year and doesn't want to continue the flomax if it can cause dehydration because she does not want to end up back in the hospital. Please advise, thanks.

## 2024-10-07 ENCOUNTER — APPOINTMENT (OUTPATIENT)
Dept: UROLOGY | Facility: CLINIC | Age: 74
End: 2024-10-07
Payer: MEDICARE

## 2024-11-04 ENCOUNTER — LAB (OUTPATIENT)
Dept: LAB | Facility: LAB | Age: 74
End: 2024-11-04
Payer: MEDICARE

## 2024-11-04 DIAGNOSIS — N18.31 CHRONIC KIDNEY DISEASE, STAGE 3A (MULTI): ICD-10-CM

## 2024-11-04 DIAGNOSIS — I12.9 HYPERTENSIVE CHRONIC KIDNEY DISEASE WITH STAGE 1 THROUGH STAGE 4 CHRONIC KIDNEY DISEASE, OR UNSPECIFIED CHRONIC KIDNEY DISEASE: ICD-10-CM

## 2024-11-04 DIAGNOSIS — E87.1 HYPO-OSMOLALITY AND HYPONATREMIA: ICD-10-CM

## 2024-11-04 DIAGNOSIS — K52.838 OTHER MICROSCOPIC COLITIS: ICD-10-CM

## 2024-11-04 DIAGNOSIS — E83.42 HYPOMAGNESEMIA: Primary | ICD-10-CM

## 2024-11-04 LAB
ALBUMIN SERPL BCP-MCNC: 4.2 G/DL (ref 3.4–5)
ANION GAP SERPL CALC-SCNC: 11 MMOL/L (ref 10–20)
BUN SERPL-MCNC: 24 MG/DL (ref 6–23)
CALCIUM SERPL-MCNC: 9.7 MG/DL (ref 8.6–10.6)
CHLORIDE SERPL-SCNC: 100 MMOL/L (ref 98–107)
CO2 SERPL-SCNC: 26 MMOL/L (ref 21–32)
CREAT SERPL-MCNC: 1.1 MG/DL (ref 0.5–1.05)
CREAT UR-MCNC: 73.3 MG/DL (ref 20–320)
EGFRCR SERPLBLD CKD-EPI 2021: 52 ML/MIN/1.73M*2
GLUCOSE SERPL-MCNC: 115 MG/DL (ref 74–99)
PHOSPHATE SERPL-MCNC: 3.5 MG/DL (ref 2.5–4.9)
POTASSIUM SERPL-SCNC: 4.1 MMOL/L (ref 3.5–5.3)
PROT UR-ACNC: 9 MG/DL (ref 5–24)
PROT/CREAT UR: 0.12 MG/MG CREAT (ref 0–0.17)
SODIUM SERPL-SCNC: 133 MMOL/L (ref 136–145)

## 2024-11-04 PROCEDURE — 36415 COLL VENOUS BLD VENIPUNCTURE: CPT

## 2024-11-04 PROCEDURE — 82570 ASSAY OF URINE CREATININE: CPT

## 2024-11-04 PROCEDURE — 84156 ASSAY OF PROTEIN URINE: CPT

## 2024-11-04 PROCEDURE — 80069 RENAL FUNCTION PANEL: CPT

## 2024-12-09 ENCOUNTER — APPOINTMENT (OUTPATIENT)
Dept: UROLOGY | Facility: CLINIC | Age: 74
End: 2024-12-09
Payer: MEDICARE

## 2024-12-09 VITALS
TEMPERATURE: 98 F | WEIGHT: 134 LBS | HEIGHT: 65 IN | BODY MASS INDEX: 22.33 KG/M2 | DIASTOLIC BLOOD PRESSURE: 80 MMHG | HEART RATE: 79 BPM | SYSTOLIC BLOOD PRESSURE: 132 MMHG

## 2024-12-09 DIAGNOSIS — R35.0 URINARY FREQUENCY: ICD-10-CM

## 2024-12-09 DIAGNOSIS — R33.9 URINARY RETENTION: Primary | ICD-10-CM

## 2024-12-09 LAB
POC APPEARANCE, URINE: CLEAR
POC BILIRUBIN, URINE: NEGATIVE
POC BLOOD, URINE: ABNORMAL
POC COLOR, URINE: YELLOW
POC GLUCOSE, URINE: NEGATIVE MG/DL
POC KETONES, URINE: NEGATIVE MG/DL
POC LEUKOCYTES, URINE: NEGATIVE
POC NITRITE,URINE: NEGATIVE
POC PH, URINE: 7 PH
POC PROTEIN, URINE: NEGATIVE MG/DL
POC SPECIFIC GRAVITY, URINE: 1.01
POC UROBILINOGEN, URINE: 0.2 EU/DL

## 2024-12-09 PROCEDURE — 51798 US URINE CAPACITY MEASURE: CPT | Performed by: NURSE PRACTITIONER

## 2024-12-09 PROCEDURE — G2211 COMPLEX E/M VISIT ADD ON: HCPCS | Performed by: NURSE PRACTITIONER

## 2024-12-09 PROCEDURE — 99213 OFFICE O/P EST LOW 20 MIN: CPT | Performed by: NURSE PRACTITIONER

## 2024-12-09 PROCEDURE — 1036F TOBACCO NON-USER: CPT | Performed by: NURSE PRACTITIONER

## 2024-12-09 PROCEDURE — 1159F MED LIST DOCD IN RCRD: CPT | Performed by: NURSE PRACTITIONER

## 2024-12-09 PROCEDURE — 81003 URINALYSIS AUTO W/O SCOPE: CPT | Performed by: NURSE PRACTITIONER

## 2024-12-09 ASSESSMENT — PATIENT HEALTH QUESTIONNAIRE - PHQ9
1. LITTLE INTEREST OR PLEASURE IN DOING THINGS: NOT AT ALL
2. FEELING DOWN, DEPRESSED OR HOPELESS: NOT AT ALL
SUM OF ALL RESPONSES TO PHQ9 QUESTIONS 1 AND 2: 0

## 2024-12-09 NOTE — PATIENT INSTRUCTIONS
Would like to continue tamsulosin for now,   Will move up time to earlier in day if tolerated  Hold fluids after 6 pm  Legs elevated in evening  Follow up 6 mos PVR  Nurse line [phone]

## 2024-12-09 NOTE — PROGRESS NOTES
12/09/24   [id number]    PVR, follow up urinary symptoms     Subjective      HPI Demetria Enriquez is a 74 y.o. female who presents for follow up urinary retention and incontinence; last seen in office 9/9/24.     Urinary frequency, still taking tamsulosin 0.4 mg as so scared to come off with retention in past; only leaks occasionally if waits too long; didn't start trospium as so scared of having a catheter again;     Depression is difficult since  stroke, on citalopram for years, buspar for anxiety;   Tearful talking about stress with 's stroke, today is their 57th anniversary;     7/5/24 rbc none/hpf  6/20/24 rbc 1-2/hpf    PVR 0 ml today    Struggling most with colitis now; doing better overall;     6/3/24 no recurrence of prolapse;     Recap:   Was going to come off tamsulosin and come in for PVR but was too afraid, has PTSD from having so many health issues and feels the catheter was the worst, she doesn't want to take chance with  not emptying if came off tamsulosin; would like to continue for now; discussed nocturia; will cut off fluids a little sooner as now drinking up until 6 pm and going to bed at 9 pm; will elevate legs in evening; discussed OAB meds, may consider trospium, already has at night if needed;     Hx urinary retention that developed in the fall of 2023 related to a fall and back issues along w UTI:     11/4/24 Creatinine 1.10, GFR 52 improved some     History reviewed:  Past Medical History:   Diagnosis Date    Anxiety     Arthritis     Delayed emergence from general anesthesia     Depression     Drusen (degenerative) of macula, unspecified eye 02/19/2015    Macular drusen    GERD (gastroesophageal reflux disease)     Hyperlipidemia     Hypothyroid     Migraine      Past Surgical History:   Procedure Laterality Date    HERNIA REPAIR      HYSTERECTOMY      OTHER SURGICAL HISTORY  03/06/2020    Rectocele and cystocele repair     Social History     Tobacco Use    Smoking status:  "Never    Smokeless tobacco: Never   Vaping Use    Vaping status: Never Used   Substance Use Topics    Alcohol use: Not Currently    Drug use: Never     Family History   Problem Relation Name Age of Onset    Dementia Mother      Heart attack Father           Objective     /80   Pulse 79   Temp 36.7 °C (98 °F)   Ht 1.651 m (5' 5\")   Wt 60.8 kg (134 lb)   BMI 22.30 kg/m²      General: Appears comfortable and in no apparent distress, well nourished  Head: Normocephalic, atraumatic  Neck: trachea midline  Respiratory: respirations unlabored, no wheezes, and no use of accessory muscles  Cardiovascular: at rest no dyspnea, well perfused  Skin: no visible rashes or lesions  Neurologic: grossly intact, oriented to person, place, and time  Psychiatric: mood and affect appropriate  Musculoskeletal: in chair for appt. no difficulty w upper body movement      Assessment/Plan   Problem List Items Addressed This Visit          Genitourinary and Reproductive    Urinary retention - Primary    Relevant Orders    POCT UA Automated manually resulted (Completed)    Post-Void Residual (Completed)     Other Visit Diagnoses       Urinary frequency        Relevant Orders    POCT UA Automated manually resulted (Completed)    Post-Void Residual (Completed)            Orders Placed This Encounter   Procedures    Post-Void Residual    POCT UA Automated manually resulted     Order Specific Question:   Release result to [facility]     Answer:   Immediate [1]      Would like to continue tamsulosin for now,   Will move up time to earlier in day if tolerated  Hold fluids after 6 pm  Legs elevated in evening  Follow up 6 mos PVR  Nurse line [phone]         NUZHAT Tapia-CNP  Lab Results   Component Value Date    GLUCOSE 115 (H) 11/04/2024    CALCIUM 9.7 11/04/2024     (L) 11/04/2024    K 4.1 11/04/2024    CO2 26 11/04/2024     11/04/2024    BUN 24 (H) 11/04/2024    CREATININE 1.10 (H) 11/04/2024       " Consent (Spinal Accessory)/Introductory Paragraph: The rationale for Mohs was explained to the patient and consent was obtained. The risks, benefits and alternatives to therapy were discussed in detail. Specifically, the risks of damage to the spinal accessory nerve, infection, scarring, bleeding, prolonged wound healing, incomplete removal, allergy to anesthesia, and recurrence were addressed. Prior to the procedure, the treatment site was clearly identified and confirmed by the patient. All components of Universal Protocol/PAUSE Rule completed.

## 2025-06-09 ENCOUNTER — APPOINTMENT (OUTPATIENT)
Dept: UROLOGY | Facility: CLINIC | Age: 75
End: 2025-06-09
Payer: MEDICARE

## 2025-06-09 VITALS — SYSTOLIC BLOOD PRESSURE: 122 MMHG | TEMPERATURE: 97.1 F | DIASTOLIC BLOOD PRESSURE: 70 MMHG | HEART RATE: 78 BPM

## 2025-06-09 DIAGNOSIS — R35.0 URINARY FREQUENCY: ICD-10-CM

## 2025-06-09 DIAGNOSIS — N39.41 URGENCY INCONTINENCE: ICD-10-CM

## 2025-06-09 DIAGNOSIS — R35.1 NOCTURIA: ICD-10-CM

## 2025-06-09 DIAGNOSIS — R33.9 URINARY RETENTION: Primary | ICD-10-CM

## 2025-06-09 PROCEDURE — 51798 US URINE CAPACITY MEASURE: CPT | Performed by: NURSE PRACTITIONER

## 2025-06-09 PROCEDURE — 1159F MED LIST DOCD IN RCRD: CPT | Performed by: NURSE PRACTITIONER

## 2025-06-09 PROCEDURE — 99213 OFFICE O/P EST LOW 20 MIN: CPT | Performed by: NURSE PRACTITIONER

## 2025-06-09 PROCEDURE — G2211 COMPLEX E/M VISIT ADD ON: HCPCS | Performed by: NURSE PRACTITIONER

## 2025-06-09 NOTE — PROGRESS NOTES
06/09/25   [id number]    PVR, follow up urinary symptoms     Subjective      HPI Demetria Enriquez is a 74 y.o. female who presents for follow up urinary retention and incontinence; last seen in office 12/9/24.     Stopped taking tamsulosin 0.4 mg, emptying bladder PVR 4 ml; no difficulty urinating, drinking fluids well; kidneys have recovered per patient; occasional minor leaks; not interested in medicine or procedure at this time;     Depression and anxiety has improved; daughter has moved in with them and takes her to appt. Developed macular degeneration since last appt. Lives changed per patient, since 's stroke;    Creatinine 1.10, GFR 52    7/5/24 rbc none/hpf  6/20/24 rbc 1-2/hpf    PVR 4 ml today    6/3/24 no recurrence of prolapse;     Recap:   Was going to come off tamsulosin and come in for PVR but was too afraid, has PTSD from having so many health issues and feels the catheter was the worst, she doesn't want to take chance with  not emptying if came off tamsulosin; would like to continue for now; discussed nocturia; will cut off fluids a little sooner as now drinking up until 6 pm and going to bed at 9 pm; will elevate legs in evening; discussed OAB meds, may consider trospium, already has at night if needed;     Hx urinary retention that developed in the fall of 2023 related to a fall and back issues along w UTI:     11/4/24 Creatinine 1.10, GFR 52 improved some     History reviewed:  Past Medical History:   Diagnosis Date    Anxiety     Arthritis     Delayed emergence from general anesthesia     Depression     Drusen (degenerative) of macula, unspecified eye 02/19/2015    Macular drusen    GERD (gastroesophageal reflux disease)     Hyperlipidemia     Hypothyroid     Migraine      Past Surgical History:   Procedure Laterality Date    HERNIA REPAIR      HYSTERECTOMY      OTHER SURGICAL HISTORY  03/06/2020    Rectocele and cystocele repair     Social History     Tobacco Use    Smoking status: Never     Smokeless tobacco: Never   Vaping Use    Vaping status: Never Used   Substance Use Topics    Alcohol use: Not Currently    Drug use: Never     Family History   Problem Relation Name Age of Onset    Dementia Mother      Heart attack Father           Objective     /70   Pulse 78   Temp 36.2 °C (97.1 °F)      General: Appears comfortable and in no apparent distress, well nourished  Head: Normocephalic, atraumatic  Neck: trachea midline  Respiratory: respirations unlabored, no wheezes, and no use of accessory muscles  Cardiovascular: at rest no dyspnea, well perfused  Skin: no visible rashes or lesions  Neurologic: grossly intact, oriented to person, place, and time  Psychiatric: mood and affect appropriate  Musculoskeletal: in chair for appt. no difficulty w upper body movement      Assessment/Plan   Problem List Items Addressed This Visit          Genitourinary and Reproductive    Urinary retention - Primary    Relevant Orders    Urinalysis with Reflex Culture and Microscopic    Nocturia     Other Visit Diagnoses         Urgency incontinence        Relevant Orders    Post-Void Residual (Completed)    Urinalysis with Reflex Culture and Microscopic      Urinary frequency        Relevant Orders    Urinalysis with Reflex Culture and Microscopic            Orders Placed This Encounter   Procedures    Post-Void Residual    Urinalysis with Reflex Culture and Microscopic     Release result to MyChart:   Immediate [1]      Off tamsulosin now  Emptying well, PVR 4 ml  Occasional OAB, not interested in medicine or procedure now  Hold fluids after 6 pm  Legs elevated in evening  Follow up 6 mos PVR  Nurse line [phone]         NUZHAT Tapia-CNP  Lab Results   Component Value Date    GLUCOSE 115 (H) 11/04/2024    CALCIUM 9.7 11/04/2024     (L) 11/04/2024    K 4.1 11/04/2024    CO2 26 11/04/2024     11/04/2024    BUN 24 (H) 11/04/2024    CREATININE 1.10 (H) 11/04/2024

## 2025-06-09 NOTE — PATIENT INSTRUCTIONS
Off tamsulosin now  Emptying well, PVR 4 ml  Occasional OAB, not interested in medicine or procedure now  Hold fluids after 6 pm  Legs elevated in evening  Follow up 1 year, PVR  Nurse line [phone]

## 2025-06-11 ENCOUNTER — TELEPHONE (OUTPATIENT)
Dept: UROLOGY | Facility: CLINIC | Age: 75
End: 2025-06-11
Payer: MEDICARE

## 2025-06-11 LAB
APPEARANCE UR: CLEAR
BACTERIA #/AREA URNS HPF: NORMAL /HPF
BACTERIA UR CULT: NORMAL
BILIRUB UR QL STRIP: NEGATIVE
COLOR UR: YELLOW
GLUCOSE UR QL STRIP: NEGATIVE
HGB UR QL STRIP: NEGATIVE
HYALINE CASTS #/AREA URNS LPF: NORMAL /LPF
KETONES UR QL STRIP: NEGATIVE
LEUKOCYTE ESTERASE UR QL STRIP: NEGATIVE
NITRITE UR QL STRIP: NEGATIVE
PH UR STRIP: 7 [PH] (ref 5–8)
PROT UR QL STRIP: NEGATIVE
RBC #/AREA URNS HPF: NORMAL /HPF
SERVICE CMNT-IMP: NORMAL
SP GR UR STRIP: 1.01 (ref 1–1.03)
SQUAMOUS #/AREA URNS HPF: NORMAL /HPF
WBC #/AREA URNS HPF: NORMAL /HPF

## 2025-06-11 NOTE — TELEPHONE ENCOUNTER
----- Message from Tuyet Wahl sent at 6/11/2025  8:03 AM EDT -----  No hematuria, no infection  ----- Message -----  From: Ron, [employer] Results In  Sent: 6/11/2025   3:56 AM EDT  To: NUZHAT Tapia-CNP

## 2025-08-13 ENCOUNTER — TELEPHONE (OUTPATIENT)
Dept: UROLOGY | Facility: CLINIC | Age: 75
End: 2025-08-13
Payer: MEDICARE

## 2025-08-13 DIAGNOSIS — R35.0 URINARY FREQUENCY: ICD-10-CM

## 2025-08-13 DIAGNOSIS — N39.41 URGENCY INCONTINENCE: Primary | ICD-10-CM

## 2025-08-17 LAB
APPEARANCE UR: CLEAR
BACTERIA #/AREA URNS HPF: ABNORMAL /HPF
BACTERIA UR CULT: ABNORMAL
BACTERIA UR CULT: ABNORMAL
BILIRUB UR QL STRIP: NEGATIVE
COLOR UR: YELLOW
GLUCOSE UR QL STRIP: NEGATIVE
HGB UR QL STRIP: NEGATIVE
HYALINE CASTS #/AREA URNS LPF: ABNORMAL /LPF
KETONES UR QL STRIP: NEGATIVE
LEUKOCYTE ESTERASE UR QL STRIP: ABNORMAL
NITRITE UR QL STRIP: NEGATIVE
PH UR STRIP: 7 [PH] (ref 5–8)
PROT UR QL STRIP: NEGATIVE
RBC #/AREA URNS HPF: ABNORMAL /HPF
SERVICE CMNT-IMP: ABNORMAL
SP GR UR STRIP: 1.01 (ref 1–1.03)
SQUAMOUS #/AREA URNS HPF: ABNORMAL /HPF
WBC #/AREA URNS HPF: ABNORMAL /HPF

## 2025-09-15 ENCOUNTER — APPOINTMENT (OUTPATIENT)
Dept: UROLOGY | Facility: CLINIC | Age: 75
End: 2025-09-15
Payer: MEDICARE

## 2026-06-08 ENCOUNTER — APPOINTMENT (OUTPATIENT)
Dept: UROLOGY | Facility: CLINIC | Age: 76
End: 2026-06-08
Payer: MEDICARE

## 2026-06-09 ENCOUNTER — APPOINTMENT (OUTPATIENT)
Dept: UROLOGY | Facility: CLINIC | Age: 76
End: 2026-06-09
Payer: MEDICARE

## (undated) DEVICE — SOLUTION, IRRIGATION, SODIUM CHLORIDE 0.9%, 1000 ML, POUR BOTTLE

## (undated) DEVICE — EVACUATOR, WOUND, SUCTION, CLOSED, JACKSON-PRATT, 100 CC, SILICONE

## (undated) DEVICE — SOLUTION, IRRIGATION, STERILE WATER, 1000 ML, POUR BOTTLE

## (undated) DEVICE — SPONGE, LAP, XRAY DECT, 18IN X 18IN, W/MASTER DMT, STERILE

## (undated) DEVICE — TRAY, SURESTEP, SILICONE DRAINAGE BAG, STATLOCK, 16FR

## (undated) DEVICE — Device

## (undated) DEVICE — PREP, SCRUB, SKIN, FOAM, HIBICLENS, 4 OZ

## (undated) DEVICE — SUTURE, PROLENE, 0, 30 IN, CT1, BLUE

## (undated) DEVICE — SUTURE, VICRYL, 0, 18 IN,TIE, UNDYED

## (undated) DEVICE — STAPLER, ENDOSCOPIC, INTERNAL, HELICAL 30, PROTACK, 5 MM, DISPOSABLE, TITANIUM

## (undated) DEVICE — PREP TRAY, VAGINAL, SKIN SCRUB, PVP-1 PAINT & 110ML PV, LF

## (undated) DEVICE — GLOVE, SURGICAL, PROTEXIS PI , 7.5, PF, LF

## (undated) DEVICE — DRAIN, WOUND, FLAT, HUBLESS, FULL LENGTH PERFORATION, 10 MM X 20 CM, SILICONE

## (undated) DEVICE — DRAPE, SHEET, ENDOSCOPY, GENERAL, FENESTRATED, ARMBOARD COVER, 98 X 123.5 IN, DISPOSABLE, LF, STERILE

## (undated) DEVICE — SUTURE, VICRYL, 4-0, 18 IN, PS2, UNDYED

## (undated) DEVICE — DEVICE, SUTURE, ENDOCLOSE, TROCAR

## (undated) DEVICE — SUTURE, PROLENE, 2-0, CT-1, BL MONO, LF

## (undated) DEVICE — SUTURE, SILK, 2-0, 18 IN, PS-2, BLK BRD

## (undated) DEVICE — DRESSING, GAUZE, 16 PLY, 4 X 4 IN, STERILE